# Patient Record
Sex: FEMALE | Race: WHITE | Employment: FULL TIME | URBAN - NONMETROPOLITAN AREA
[De-identification: names, ages, dates, MRNs, and addresses within clinical notes are randomized per-mention and may not be internally consistent; named-entity substitution may affect disease eponyms.]

---

## 2017-04-28 ENCOUNTER — TELEPHONE (OUTPATIENT)
Dept: FAMILY MEDICINE CLINIC | Age: 52
End: 2017-04-28

## 2017-05-05 DIAGNOSIS — F41.1 GAD (GENERALIZED ANXIETY DISORDER): ICD-10-CM

## 2017-05-05 RX ORDER — CLONAZEPAM 0.5 MG/1
0.5 TABLET ORAL 2 TIMES DAILY PRN
Qty: 60 TABLET | Refills: 3 | Status: SHIPPED | OUTPATIENT
Start: 2017-05-05 | End: 2017-07-05

## 2017-06-22 ENCOUNTER — OFFICE VISIT (OUTPATIENT)
Dept: FAMILY MEDICINE CLINIC | Age: 52
End: 2017-06-22

## 2017-06-22 VITALS
DIASTOLIC BLOOD PRESSURE: 80 MMHG | TEMPERATURE: 98.9 F | BODY MASS INDEX: 33.68 KG/M2 | WEIGHT: 183 LBS | HEART RATE: 80 BPM | RESPIRATION RATE: 12 BRPM | HEIGHT: 62 IN | SYSTOLIC BLOOD PRESSURE: 132 MMHG

## 2017-06-22 DIAGNOSIS — F33.42 RECURRENT MAJOR DEPRESSIVE DISORDER, IN FULL REMISSION (HCC): ICD-10-CM

## 2017-06-22 DIAGNOSIS — N20.0 KIDNEY STONES: Primary | ICD-10-CM

## 2017-06-22 DIAGNOSIS — R19.4 BOWEL HABIT CHANGES: ICD-10-CM

## 2017-06-22 DIAGNOSIS — E55.9 VITAMIN D DEFICIENCY: ICD-10-CM

## 2017-06-22 DIAGNOSIS — S80.11XA TRAUMATIC HEMATOMA OF RIGHT LOWER LEG, INITIAL ENCOUNTER: ICD-10-CM

## 2017-06-22 DIAGNOSIS — F41.1 GAD (GENERALIZED ANXIETY DISORDER): ICD-10-CM

## 2017-06-22 DIAGNOSIS — N30.01 ACUTE CYSTITIS WITH HEMATURIA: ICD-10-CM

## 2017-06-22 LAB
BILIRUBIN, POC: NEGATIVE
BLOOD URINE, POC: ABNORMAL
CLARITY, POC: CLEAR
COLOR, POC: YELLOW
GLUCOSE URINE, POC: NEGATIVE
KETONES, POC: NEGATIVE
LEUKOCYTE EST, POC: ABNORMAL
NITRITE, POC: ABNORMAL
PH, POC: 5.5
PROTEIN, POC: NEGATIVE
SPECIFIC GRAVITY, POC: >=1.03
UROBILINOGEN, POC: ABNORMAL

## 2017-06-22 PROCEDURE — 99214 OFFICE O/P EST MOD 30 MIN: CPT | Performed by: NURSE PRACTITIONER

## 2017-06-22 PROCEDURE — 81003 URINALYSIS AUTO W/O SCOPE: CPT | Performed by: NURSE PRACTITIONER

## 2017-06-22 RX ORDER — BUSPIRONE HYDROCHLORIDE 10 MG/1
10 TABLET ORAL 3 TIMES DAILY
Qty: 90 TABLET | Refills: 5 | Status: SHIPPED | OUTPATIENT
Start: 2017-06-22 | End: 2017-12-29

## 2017-06-22 RX ORDER — NITROFURANTOIN 25; 75 MG/1; MG/1
100 CAPSULE ORAL 2 TIMES DAILY
Qty: 20 CAPSULE | Refills: 0 | Status: SHIPPED | OUTPATIENT
Start: 2017-06-22 | End: 2017-07-02

## 2017-06-24 LAB
ORGANISM: ABNORMAL
URINE CULTURE, ROUTINE: ABNORMAL

## 2017-06-26 ENCOUNTER — TELEPHONE (OUTPATIENT)
Dept: FAMILY MEDICINE CLINIC | Age: 52
End: 2017-06-26

## 2017-06-29 ENCOUNTER — TELEPHONE (OUTPATIENT)
Dept: FAMILY MEDICINE CLINIC | Age: 52
End: 2017-06-29

## 2017-07-05 ENCOUNTER — OFFICE VISIT (OUTPATIENT)
Dept: UROLOGY | Age: 52
End: 2017-07-05

## 2017-07-05 VITALS
BODY MASS INDEX: 33.13 KG/M2 | SYSTOLIC BLOOD PRESSURE: 118 MMHG | DIASTOLIC BLOOD PRESSURE: 82 MMHG | WEIGHT: 180 LBS | HEIGHT: 62 IN

## 2017-07-05 DIAGNOSIS — N39.0 URINARY TRACT INFECTION, SITE UNSPECIFIED: Primary | ICD-10-CM

## 2017-07-05 LAB
BILIRUBIN URINE: NEGATIVE
BLOOD URINE, POC: NORMAL
CHARACTER, URINE: CLEAR
COLOR, URINE: YELLOW
GLUCOSE URINE: NEGATIVE MG/DL
KETONES, URINE: NEGATIVE
LEUKOCYTE CLUMPS, URINE: NORMAL
NITRITE, URINE: POSITIVE
PH, URINE: 6
PROTEIN, URINE: NEGATIVE MG/DL
SPECIFIC GRAVITY, URINE: 1.01 (ref 1–1.03)
UROBILINOGEN, URINE: 0.2 EU/DL

## 2017-07-05 PROCEDURE — 99213 OFFICE O/P EST LOW 20 MIN: CPT | Performed by: NURSE PRACTITIONER

## 2017-07-05 PROCEDURE — 81003 URINALYSIS AUTO W/O SCOPE: CPT | Performed by: NURSE PRACTITIONER

## 2017-07-05 RX ORDER — DOXYCYCLINE HYCLATE 100 MG/1
100 CAPSULE ORAL 2 TIMES DAILY
Qty: 20 CAPSULE | Refills: 0 | Status: SHIPPED | OUTPATIENT
Start: 2017-07-05 | End: 2017-07-15

## 2017-07-05 ASSESSMENT — ENCOUNTER SYMPTOMS
NAUSEA: 0
VOMITING: 0
ABDOMINAL PAIN: 0

## 2017-07-07 ENCOUNTER — TELEPHONE (OUTPATIENT)
Dept: UROLOGY | Age: 52
End: 2017-07-07

## 2017-07-07 LAB
ORGANISM: ABNORMAL
URINE CULTURE, ROUTINE: ABNORMAL

## 2017-07-18 ENCOUNTER — HOSPITAL ENCOUNTER (OUTPATIENT)
Dept: CT IMAGING | Age: 52
Discharge: HOME | End: 2017-07-18
Payer: COMMERCIAL

## 2017-07-18 DIAGNOSIS — N20.0 CALCULUS OF KIDNEY: ICD-10-CM

## 2017-07-18 DIAGNOSIS — N39.0 URINARY TRACT INFECTION, SITE UNSPECIFIED: ICD-10-CM

## 2017-07-18 PROCEDURE — 74176 CT ABD & PELVIS W/O CONTRAST: CPT

## 2017-07-21 ENCOUNTER — TELEPHONE (OUTPATIENT)
Dept: UROLOGY | Age: 52
End: 2017-07-21

## 2017-07-24 ENCOUNTER — TELEPHONE (OUTPATIENT)
Dept: UROLOGY | Age: 52
End: 2017-07-24

## 2017-08-03 ENCOUNTER — HOSPITAL ENCOUNTER (OUTPATIENT)
Dept: GENERAL RADIOLOGY | Age: 52
Discharge: HOME | End: 2017-08-03
Payer: COMMERCIAL

## 2017-08-03 ENCOUNTER — TELEPHONE (OUTPATIENT)
Dept: UROLOGY | Age: 52
End: 2017-08-03

## 2017-08-03 ENCOUNTER — HOSPITAL ENCOUNTER (OUTPATIENT)
Age: 52
Discharge: HOME | End: 2017-08-03
Payer: COMMERCIAL

## 2017-08-03 DIAGNOSIS — N20.0 KIDNEY STONES: ICD-10-CM

## 2017-08-03 DIAGNOSIS — Z01.818 PRE-OP TESTING: ICD-10-CM

## 2017-08-03 DIAGNOSIS — N20.0 KIDNEY STONES: Primary | ICD-10-CM

## 2017-08-03 LAB
AMORPHOUS: ABNORMAL
ANION GAP SERPL CALCULATED.3IONS-SCNC: 17 MEQ/L (ref 8–16)
BACTERIA: ABNORMAL /HPF
BASOPHILS # BLD: 0.7 %
BASOPHILS ABSOLUTE: 0.1 THOU/MM3 (ref 0–0.1)
BILIRUBIN URINE: NEGATIVE
BLOOD, URINE: NEGATIVE
BUN BLDV-MCNC: 15 MG/DL (ref 7–22)
CALCIUM SERPL-MCNC: 9.7 MG/DL (ref 8.5–10.5)
CASTS 2: ABNORMAL /LPF
CASTS UA: ABNORMAL /LPF
CHARACTER, URINE: ABNORMAL
CHLORIDE BLD-SCNC: 106 MEQ/L (ref 98–111)
CO2: 27 MEQ/L (ref 23–33)
COLOR: YELLOW
CREAT SERPL-MCNC: 0.9 MG/DL (ref 0.4–1.2)
CRYSTALS, UA: ABNORMAL
EKG ATRIAL RATE: 60 BPM
EKG P AXIS: 42 DEGREES
EKG P-R INTERVAL: 170 MS
EKG Q-T INTERVAL: 418 MS
EKG QRS DURATION: 86 MS
EKG QTC CALCULATION (BAZETT): 418 MS
EKG R AXIS: 17 DEGREES
EKG T AXIS: 17 DEGREES
EKG VENTRICULAR RATE: 60 BPM
EOSINOPHIL # BLD: 3 %
EOSINOPHILS ABSOLUTE: 0.2 THOU/MM3 (ref 0–0.4)
EPITHELIAL CELLS, UA: ABNORMAL /HPF
GFR SERPL CREATININE-BSD FRML MDRD: 65 ML/MIN/1.73M2
GLUCOSE BLD-MCNC: 105 MG/DL (ref 70–108)
GLUCOSE URINE: NEGATIVE MG/DL
HCT VFR BLD CALC: 43.5 % (ref 37–47)
HEMOGLOBIN: 15.5 GM/DL (ref 12–16)
KETONES, URINE: NEGATIVE
LEUKOCYTE ESTERASE, URINE: ABNORMAL
LYMPHOCYTES # BLD: 30.1 %
LYMPHOCYTES ABSOLUTE: 2.2 THOU/MM3 (ref 1–4.8)
MCH RBC QN AUTO: 32.8 PG (ref 27–31)
MCHC RBC AUTO-ENTMCNC: 35.5 GM/DL (ref 33–37)
MCV RBC AUTO: 92.4 FL (ref 81–99)
MISCELLANEOUS 2: ABNORMAL
MONOCYTES # BLD: 7 %
MONOCYTES ABSOLUTE: 0.5 THOU/MM3 (ref 0.4–1.3)
NITRITE, URINE: NEGATIVE
NUCLEATED RED BLOOD CELLS: 0 /100 WBC
PDW BLD-RTO: 12.3 % (ref 11.5–14.5)
PH UA: 6.5
PLATELET # BLD: 216 THOU/MM3 (ref 130–400)
PMV BLD AUTO: 9.5 MCM (ref 7.4–10.4)
POTASSIUM SERPL-SCNC: 4 MEQ/L (ref 3.5–5.2)
PROTEIN UA: ABNORMAL
RBC # BLD: 4.71 MILL/MM3 (ref 4.2–5.4)
RBC # BLD: NORMAL 10*6/UL
RBC URINE: ABNORMAL /HPF
RENAL EPITHELIAL, UA: ABNORMAL
SEG NEUTROPHILS: 59.2 %
SEGMENTED NEUTROPHILS ABSOLUTE COUNT: 4.3 THOU/MM3 (ref 1.8–7.7)
SODIUM BLD-SCNC: 150 MEQ/L (ref 135–145)
SPECIFIC GRAVITY, URINE: 1.02 (ref 1–1.03)
UROBILINOGEN, URINE: 0.2 EU/DL
WBC # BLD: 7.3 THOU/MM3 (ref 4.8–10.8)
WBC UA: ABNORMAL /HPF
YEAST: ABNORMAL

## 2017-08-03 PROCEDURE — 93005 ELECTROCARDIOGRAM TRACING: CPT

## 2017-08-03 PROCEDURE — 81001 URINALYSIS AUTO W/SCOPE: CPT

## 2017-08-03 PROCEDURE — 85025 COMPLETE CBC W/AUTO DIFF WBC: CPT

## 2017-08-03 PROCEDURE — 71020 XR CHEST STANDARD TWO VW: CPT

## 2017-08-03 PROCEDURE — 36415 COLL VENOUS BLD VENIPUNCTURE: CPT

## 2017-08-03 PROCEDURE — 80048 BASIC METABOLIC PNL TOTAL CA: CPT

## 2017-08-03 PROCEDURE — 87086 URINE CULTURE/COLONY COUNT: CPT

## 2017-08-04 ENCOUNTER — HOSPITAL ENCOUNTER (OUTPATIENT)
Age: 52
Setting detail: OUTPATIENT SURGERY
Discharge: HOME | End: 2017-08-04
Attending: UROLOGY | Admitting: UROLOGY
Payer: COMMERCIAL

## 2017-08-04 ENCOUNTER — ANESTHESIA EVENT (OUTPATIENT)
Dept: OPERATING ROOM | Age: 52
End: 2017-08-04
Payer: COMMERCIAL

## 2017-08-04 ENCOUNTER — ANESTHESIA (OUTPATIENT)
Dept: OPERATING ROOM | Age: 52
End: 2017-08-04
Payer: COMMERCIAL

## 2017-08-04 VITALS
TEMPERATURE: 97.4 F | HEIGHT: 63 IN | DIASTOLIC BLOOD PRESSURE: 77 MMHG | RESPIRATION RATE: 14 BRPM | SYSTOLIC BLOOD PRESSURE: 147 MMHG | HEART RATE: 79 BPM | WEIGHT: 173.06 LBS | BODY MASS INDEX: 30.66 KG/M2 | OXYGEN SATURATION: 97 %

## 2017-08-04 VITALS
TEMPERATURE: 96.8 F | OXYGEN SATURATION: 99 % | SYSTOLIC BLOOD PRESSURE: 106 MMHG | DIASTOLIC BLOOD PRESSURE: 57 MMHG | RESPIRATION RATE: 13 BRPM

## 2017-08-04 LAB
ORGANISM: ABNORMAL
URINE CULTURE REFLEX: ABNORMAL

## 2017-08-04 PROCEDURE — 6360000002 HC RX W HCPCS: Performed by: UROLOGY

## 2017-08-04 PROCEDURE — 52332 CYSTOSCOPY AND TREATMENT: CPT | Performed by: UROLOGY

## 2017-08-04 PROCEDURE — 2580000003 HC RX 258: Performed by: UROLOGY

## 2017-08-04 PROCEDURE — 7100000001 HC PACU RECOVERY - ADDTL 15 MIN: Performed by: UROLOGY

## 2017-08-04 PROCEDURE — 3600000013 HC SURGERY LEVEL 3 ADDTL 15MIN: Performed by: UROLOGY

## 2017-08-04 PROCEDURE — 7100000010 HC PHASE II RECOVERY - FIRST 15 MIN: Performed by: UROLOGY

## 2017-08-04 PROCEDURE — 3600000003 HC SURGERY LEVEL 3 BASE: Performed by: UROLOGY

## 2017-08-04 PROCEDURE — 7100000011 HC PHASE II RECOVERY - ADDTL 15 MIN: Performed by: UROLOGY

## 2017-08-04 PROCEDURE — 6360000002 HC RX W HCPCS: Performed by: NURSE ANESTHETIST, CERTIFIED REGISTERED

## 2017-08-04 PROCEDURE — 7100000000 HC PACU RECOVERY - FIRST 15 MIN: Performed by: UROLOGY

## 2017-08-04 PROCEDURE — 3700000001 HC ADD 15 MINUTES (ANESTHESIA): Performed by: UROLOGY

## 2017-08-04 PROCEDURE — 3700000000 HC ANESTHESIA ATTENDED CARE: Performed by: UROLOGY

## 2017-08-04 PROCEDURE — 6360000004 HC RX CONTRAST MEDICATION: Performed by: UROLOGY

## 2017-08-04 PROCEDURE — 52344 CYSTO/URETERO STRICTURE TX: CPT | Performed by: UROLOGY

## 2017-08-04 PROCEDURE — C2617 STENT, NON-COR, TEM W/O DEL: HCPCS | Performed by: UROLOGY

## 2017-08-04 PROCEDURE — 2500000003 HC RX 250 WO HCPCS: Performed by: NURSE ANESTHETIST, CERTIFIED REGISTERED

## 2017-08-04 PROCEDURE — C1726 CATH, BAL DIL, NON-VASCULAR: HCPCS | Performed by: UROLOGY

## 2017-08-04 PROCEDURE — C1769 GUIDE WIRE: HCPCS | Performed by: UROLOGY

## 2017-08-04 DEVICE — STENT URET 7FR L22-30CM PERCFLX HYDR+ SFT PGTL TAPR TIP: Type: IMPLANTABLE DEVICE | Site: URETER | Status: FUNCTIONAL

## 2017-08-04 RX ORDER — MEPERIDINE HYDROCHLORIDE 25 MG/ML
12.5 INJECTION INTRAMUSCULAR; INTRAVENOUS; SUBCUTANEOUS EVERY 5 MIN PRN
Status: DISCONTINUED | OUTPATIENT
Start: 2017-08-04 | End: 2017-08-04 | Stop reason: HOSPADM

## 2017-08-04 RX ORDER — SODIUM CHLORIDE 0.9 % (FLUSH) 0.9 %
10 SYRINGE (ML) INJECTION PRN
Status: DISCONTINUED | OUTPATIENT
Start: 2017-08-04 | End: 2017-08-04 | Stop reason: HOSPADM

## 2017-08-04 RX ORDER — SULFAMETHOXAZOLE AND TRIMETHOPRIM 800; 160 MG/1; MG/1
1 TABLET ORAL EVERY 12 HOURS
Qty: 10 TABLET | Refills: 0 | Status: SHIPPED | OUTPATIENT
Start: 2017-08-04 | End: 2017-08-09

## 2017-08-04 RX ORDER — HYDROCODONE BITARTRATE AND ACETAMINOPHEN 5; 325 MG/1; MG/1
2 TABLET ORAL EVERY 4 HOURS PRN
Status: DISCONTINUED | OUTPATIENT
Start: 2017-08-04 | End: 2017-08-04 | Stop reason: HOSPADM

## 2017-08-04 RX ORDER — DOCUSATE SODIUM 100 MG/1
100 CAPSULE, LIQUID FILLED ORAL DAILY PRN
Qty: 30 CAPSULE | Refills: 1 | Status: SHIPPED | OUTPATIENT
Start: 2017-08-04 | End: 2019-10-17 | Stop reason: SDUPTHER

## 2017-08-04 RX ORDER — FENTANYL CITRATE 50 UG/ML
50 INJECTION, SOLUTION INTRAMUSCULAR; INTRAVENOUS EVERY 5 MIN PRN
Status: DISCONTINUED | OUTPATIENT
Start: 2017-08-04 | End: 2017-08-04 | Stop reason: HOSPADM

## 2017-08-04 RX ORDER — PROMETHAZINE HYDROCHLORIDE 25 MG/ML
6.25 INJECTION, SOLUTION INTRAMUSCULAR; INTRAVENOUS
Status: DISCONTINUED | OUTPATIENT
Start: 2017-08-04 | End: 2017-08-04 | Stop reason: HOSPADM

## 2017-08-04 RX ORDER — MIDAZOLAM HYDROCHLORIDE 1 MG/ML
INJECTION INTRAMUSCULAR; INTRAVENOUS PRN
Status: DISCONTINUED | OUTPATIENT
Start: 2017-08-04 | End: 2017-08-04 | Stop reason: SDUPTHER

## 2017-08-04 RX ORDER — HYDRALAZINE HYDROCHLORIDE 20 MG/ML
5 INJECTION INTRAMUSCULAR; INTRAVENOUS EVERY 10 MIN PRN
Status: DISCONTINUED | OUTPATIENT
Start: 2017-08-04 | End: 2017-08-04 | Stop reason: HOSPADM

## 2017-08-04 RX ORDER — FENTANYL CITRATE 50 UG/ML
25 INJECTION, SOLUTION INTRAMUSCULAR; INTRAVENOUS EVERY 5 MIN PRN
Status: DISCONTINUED | OUTPATIENT
Start: 2017-08-04 | End: 2017-08-04 | Stop reason: HOSPADM

## 2017-08-04 RX ORDER — SODIUM CHLORIDE 9 MG/ML
INJECTION, SOLUTION INTRAVENOUS CONTINUOUS
Status: DISCONTINUED | OUTPATIENT
Start: 2017-08-04 | End: 2017-08-04 | Stop reason: HOSPADM

## 2017-08-04 RX ORDER — HYDROCODONE BITARTRATE AND ACETAMINOPHEN 5; 325 MG/1; MG/1
1 TABLET ORAL EVERY 6 HOURS PRN
Qty: 10 TABLET | Refills: 0 | Status: SHIPPED | OUTPATIENT
Start: 2017-08-04 | End: 2017-08-07

## 2017-08-04 RX ORDER — SODIUM CHLORIDE 450 MG/100ML
INJECTION, SOLUTION INTRAVENOUS CONTINUOUS
Status: DISCONTINUED | OUTPATIENT
Start: 2017-08-04 | End: 2017-08-04 | Stop reason: HOSPADM

## 2017-08-04 RX ORDER — ONDANSETRON 2 MG/ML
4 INJECTION INTRAMUSCULAR; INTRAVENOUS
Status: DISCONTINUED | OUTPATIENT
Start: 2017-08-04 | End: 2017-08-04 | Stop reason: HOSPADM

## 2017-08-04 RX ORDER — ONDANSETRON 2 MG/ML
4 INJECTION INTRAMUSCULAR; INTRAVENOUS EVERY 6 HOURS PRN
Status: DISCONTINUED | OUTPATIENT
Start: 2017-08-04 | End: 2017-08-04 | Stop reason: HOSPADM

## 2017-08-04 RX ORDER — ONDANSETRON 2 MG/ML
INJECTION INTRAMUSCULAR; INTRAVENOUS PRN
Status: DISCONTINUED | OUTPATIENT
Start: 2017-08-04 | End: 2017-08-04 | Stop reason: SDUPTHER

## 2017-08-04 RX ORDER — LABETALOL HYDROCHLORIDE 5 MG/ML
5 INJECTION, SOLUTION INTRAVENOUS EVERY 10 MIN PRN
Status: DISCONTINUED | OUTPATIENT
Start: 2017-08-04 | End: 2017-08-04 | Stop reason: HOSPADM

## 2017-08-04 RX ORDER — FENTANYL CITRATE 50 UG/ML
INJECTION, SOLUTION INTRAMUSCULAR; INTRAVENOUS PRN
Status: DISCONTINUED | OUTPATIENT
Start: 2017-08-04 | End: 2017-08-04 | Stop reason: SDUPTHER

## 2017-08-04 RX ORDER — EPHEDRINE SULFATE 50 MG/ML
INJECTION INTRAVENOUS PRN
Status: DISCONTINUED | OUTPATIENT
Start: 2017-08-04 | End: 2017-08-04 | Stop reason: SDUPTHER

## 2017-08-04 RX ORDER — ATROPA BELLADONNA AND OPIUM 16.2; 3 MG/1; MG/1
30 SUPPOSITORY RECTAL EVERY 8 HOURS PRN
Status: DISCONTINUED | OUTPATIENT
Start: 2017-08-04 | End: 2017-08-04 | Stop reason: HOSPADM

## 2017-08-04 RX ORDER — SODIUM CHLORIDE 0.9 % (FLUSH) 0.9 %
10 SYRINGE (ML) INJECTION EVERY 12 HOURS SCHEDULED
Status: DISCONTINUED | OUTPATIENT
Start: 2017-08-04 | End: 2017-08-04 | Stop reason: HOSPADM

## 2017-08-04 RX ORDER — HYDROCODONE BITARTRATE AND ACETAMINOPHEN 5; 325 MG/1; MG/1
1 TABLET ORAL EVERY 4 HOURS PRN
Status: DISCONTINUED | OUTPATIENT
Start: 2017-08-04 | End: 2017-08-04 | Stop reason: HOSPADM

## 2017-08-04 RX ORDER — PROPOFOL 10 MG/ML
INJECTION, EMULSION INTRAVENOUS PRN
Status: DISCONTINUED | OUTPATIENT
Start: 2017-08-04 | End: 2017-08-04 | Stop reason: SDUPTHER

## 2017-08-04 RX ORDER — ACETAMINOPHEN 325 MG/1
650 TABLET ORAL EVERY 4 HOURS PRN
Status: DISCONTINUED | OUTPATIENT
Start: 2017-08-04 | End: 2017-08-04 | Stop reason: HOSPADM

## 2017-08-04 RX ADMIN — EPHEDRINE SULFATE 15 MG: 50 INJECTION INTRAVENOUS at 09:37

## 2017-08-04 RX ADMIN — MIDAZOLAM 2 MG: 1 INJECTION INTRAMUSCULAR; INTRAVENOUS at 09:21

## 2017-08-04 RX ADMIN — EPHEDRINE SULFATE 15 MG: 50 INJECTION INTRAVENOUS at 09:33

## 2017-08-04 RX ADMIN — PROPOFOL 180 MG: 10 INJECTION, EMULSION INTRAVENOUS at 09:25

## 2017-08-04 RX ADMIN — LIDOCAINE HYDROCHLORIDE 100 MG: 20 INJECTION, SOLUTION INTRAVENOUS at 09:25

## 2017-08-04 RX ADMIN — FENTANYL CITRATE 100 MCG: 50 INJECTION INTRAMUSCULAR; INTRAVENOUS at 09:23

## 2017-08-04 RX ADMIN — SODIUM CHLORIDE: 9 INJECTION, SOLUTION INTRAVENOUS at 08:31

## 2017-08-04 RX ADMIN — ONDANSETRON 4 MG: 2 INJECTION INTRAMUSCULAR; INTRAVENOUS at 09:47

## 2017-08-04 RX ADMIN — CEFTRIAXONE 1 G: 1 INJECTION, POWDER, FOR SOLUTION INTRAMUSCULAR; INTRAVENOUS at 09:30

## 2017-08-04 ASSESSMENT — PULMONARY FUNCTION TESTS
PIF_VALUE: 1
PIF_VALUE: 26
PIF_VALUE: 12
PIF_VALUE: 12
PIF_VALUE: 11
PIF_VALUE: 2
PIF_VALUE: 12
PIF_VALUE: 22
PIF_VALUE: 2
PIF_VALUE: 1
PIF_VALUE: 10
PIF_VALUE: 2
PIF_VALUE: 12
PIF_VALUE: 16
PIF_VALUE: 2
PIF_VALUE: 10
PIF_VALUE: 2
PIF_VALUE: 12
PIF_VALUE: 11
PIF_VALUE: 24
PIF_VALUE: 2
PIF_VALUE: 11
PIF_VALUE: 12
PIF_VALUE: 12
PIF_VALUE: 1
PIF_VALUE: 12
PIF_VALUE: 11
PIF_VALUE: 11
PIF_VALUE: 12
PIF_VALUE: 1

## 2017-08-04 ASSESSMENT — PAIN SCALES - GENERAL
PAINLEVEL_OUTOF10: 0
PAINLEVEL_OUTOF10: 6
PAINLEVEL_OUTOF10: 0
PAINLEVEL_OUTOF10: 0

## 2017-08-04 ASSESSMENT — PAIN - FUNCTIONAL ASSESSMENT: PAIN_FUNCTIONAL_ASSESSMENT: 0-10

## 2017-08-04 ASSESSMENT — PAIN DESCRIPTION - DESCRIPTORS: DESCRIPTORS: ACHING

## 2017-08-09 DIAGNOSIS — F41.1 GAD (GENERALIZED ANXIETY DISORDER): ICD-10-CM

## 2017-08-09 DIAGNOSIS — F33.1 MODERATE EPISODE OF RECURRENT MAJOR DEPRESSIVE DISORDER (HCC): ICD-10-CM

## 2017-08-09 RX ORDER — VENLAFAXINE HYDROCHLORIDE 75 MG/1
CAPSULE, EXTENDED RELEASE ORAL
Qty: 90 CAPSULE | Refills: 3 | Status: SHIPPED | OUTPATIENT
Start: 2017-08-09 | End: 2018-10-02 | Stop reason: SDUPTHER

## 2017-08-09 RX ORDER — VENLAFAXINE HYDROCHLORIDE 150 MG/1
CAPSULE, EXTENDED RELEASE ORAL
Qty: 90 CAPSULE | Refills: 3 | Status: SHIPPED | OUTPATIENT
Start: 2017-08-09 | End: 2018-10-02 | Stop reason: SDUPTHER

## 2017-08-09 RX ORDER — VENLAFAXINE HYDROCHLORIDE 150 MG/1
CAPSULE, EXTENDED RELEASE ORAL
Qty: 90 CAPSULE | Refills: 3 | OUTPATIENT
Start: 2017-08-09

## 2017-08-09 RX ORDER — VENLAFAXINE HYDROCHLORIDE 75 MG/1
CAPSULE, EXTENDED RELEASE ORAL
Qty: 90 CAPSULE | Refills: 3 | OUTPATIENT
Start: 2017-08-09

## 2017-08-14 ENCOUNTER — PROCEDURE VISIT (OUTPATIENT)
Dept: UROLOGY | Age: 52
End: 2017-08-14
Payer: COMMERCIAL

## 2017-08-14 VITALS
WEIGHT: 170 LBS | HEIGHT: 63 IN | SYSTOLIC BLOOD PRESSURE: 138 MMHG | BODY MASS INDEX: 30.12 KG/M2 | DIASTOLIC BLOOD PRESSURE: 92 MMHG

## 2017-08-14 DIAGNOSIS — N13.5 URETERAL STRICTURE: ICD-10-CM

## 2017-08-14 DIAGNOSIS — N20.0 KIDNEY STONES: Primary | ICD-10-CM

## 2017-08-14 PROCEDURE — 52310 CYSTOSCOPY AND TREATMENT: CPT | Performed by: UROLOGY

## 2017-08-14 PROCEDURE — 99999 PR OFFICE/OUTPT VISIT,PROCEDURE ONLY: CPT | Performed by: UROLOGY

## 2017-11-02 ENCOUNTER — HOSPITAL ENCOUNTER (EMERGENCY)
Age: 52
Discharge: HOME | End: 2017-11-02
Payer: COMMERCIAL

## 2017-11-02 VITALS
SYSTOLIC BLOOD PRESSURE: 139 MMHG | WEIGHT: 177.6 LBS | HEIGHT: 64 IN | RESPIRATION RATE: 16 BRPM | TEMPERATURE: 99.1 F | OXYGEN SATURATION: 97 % | DIASTOLIC BLOOD PRESSURE: 80 MMHG | BODY MASS INDEX: 30.32 KG/M2 | HEART RATE: 66 BPM

## 2017-11-02 DIAGNOSIS — R52 BODY ACHES: ICD-10-CM

## 2017-11-02 DIAGNOSIS — J20.9 ACUTE BRONCHITIS, UNSPECIFIED ORGANISM: Primary | ICD-10-CM

## 2017-11-02 LAB
FLU A ANTIGEN: NEGATIVE
INFLUENZA B AG, EIA: NEGATIVE

## 2017-11-02 PROCEDURE — 99213 OFFICE O/P EST LOW 20 MIN: CPT | Performed by: NURSE PRACTITIONER

## 2017-11-02 PROCEDURE — 99214 OFFICE O/P EST MOD 30 MIN: CPT

## 2017-11-02 PROCEDURE — 87804 INFLUENZA ASSAY W/OPTIC: CPT

## 2017-11-02 RX ORDER — PREDNISONE 20 MG/1
20 TABLET ORAL 2 TIMES DAILY
Qty: 10 TABLET | Refills: 0 | Status: SHIPPED | OUTPATIENT
Start: 2017-11-02 | End: 2017-11-07

## 2017-11-02 RX ORDER — DEXTROMETHORPHAN HYDROBROMIDE AND PROMETHAZINE HYDROCHLORIDE 15; 6.25 MG/5ML; MG/5ML
5 SYRUP ORAL 4 TIMES DAILY PRN
Qty: 120 ML | Refills: 0 | Status: SHIPPED | OUTPATIENT
Start: 2017-11-02 | End: 2017-11-09

## 2017-11-02 RX ORDER — DOXYCYCLINE HYCLATE 100 MG
100 TABLET ORAL 2 TIMES DAILY
Qty: 20 TABLET | Refills: 0 | Status: SHIPPED | OUTPATIENT
Start: 2017-11-02 | End: 2017-11-12

## 2017-11-02 ASSESSMENT — ENCOUNTER SYMPTOMS
VOMITING: 0
NAUSEA: 0
ABDOMINAL PAIN: 0
RHINORRHEA: 0
COUGH: 1
CHEST TIGHTNESS: 0
DIARRHEA: 0
SORE THROAT: 0
SHORTNESS OF BREATH: 0
SINUS CONGESTION: 0

## 2017-11-02 ASSESSMENT — PAIN DESCRIPTION - PAIN TYPE: TYPE: ACUTE PAIN

## 2017-11-02 ASSESSMENT — PAIN SCALES - GENERAL: PAINLEVEL_OUTOF10: 9

## 2017-11-02 ASSESSMENT — PAIN DESCRIPTION - LOCATION
LOCATION_2: GENERALIZED
LOCATION: THROAT

## 2017-11-02 ASSESSMENT — PAIN DESCRIPTION - DESCRIPTORS: DESCRIPTORS_2: ACHING

## 2017-11-02 ASSESSMENT — PAIN DESCRIPTION - INTENSITY: RATING_2: 10

## 2017-11-02 NOTE — ED TRIAGE NOTES
Patient ambulated to room with complaint of cough and general body ache that started Sunday night. Patient states she has missed work all week and has been taking over the counter cough medication and symptoms remain.

## 2017-11-02 NOTE — LETTER
[address]  [facility]  [facility] [id number]  Phone: [phone]               November 2, 2017    Patient: Myla Chery   YOB: 1965   Date of Visit: 11/2/2017       To Whom It May Concern:    Cristobal Darby was seen and treated in our emergency department on 11/2/2017. She may return to work on 11/4/2017. [facility] has been sick over the past several days and then came here for treatment today.       Sincerely,       Marquis Dorie NP         Signature:_Electronically signed by Marquis Dorie NP on 11/2/2017 at 12:08 PM  _________________________________

## 2017-11-02 NOTE — ED PROVIDER NOTES
[id number]  Urgent Care Encounter       CHIEF COMPLAINT       Chief Complaint   Patient presents with    Cough    Generalized Body Aches       Nurses Notes reviewed and I agree except as noted in the HPI. HISTORY OF PRESENT ILLNESS   Anthony Gerber is a 45 y.o. female who presents To the urgent care center complaining of generalized body aches not a moist nonproductive cough present. 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 The patient states that she has not worked all week and has felt very fatigue and had a congested sounding cough and body aches. Patient denied any nausea or vomiting. The history is provided by the patient. No  was used. Cough   Cough characteristics:  Non-productive  Severity:  Mild  Onset quality:  Gradual  Duration:  5 days  Progression:  Worsening  Chronicity:  New  Smoker: no    Context: not sick contacts    Relieved by:  Nothing  Ineffective treatments:  Cough suppressants and decongestant (robitussin and dayQuil)  Associated symptoms: chills and myalgias    Associated symptoms: no ear fullness, no ear pain, no fever, no rhinorrhea, no shortness of breath, no sinus congestion and no sore throat    Myalgias:     Location:  Generalized    Quality:  Aching    Severity:  Moderate    Onset quality:  Sudden    Duration:  5 days    Timing:  Constant    Progression:  Unchanged  Generalized Body Aches   Associated symptoms: cough, fatigue and myalgias    Associated symptoms: no abdominal pain, no diarrhea, no ear pain, no fever, no nausea, no rhinorrhea, no shortness of breath, no sore throat and no vomiting        REVIEW OF SYSTEMS     Review of Systems   Constitutional: Positive for activity change, chills and fatigue. Negative for fever. HENT: Negative for ear pain, rhinorrhea and sore throat. Respiratory: Positive for cough.  Negative for chest tightness and shortness of breath. Gastrointestinal: Negative for abdominal pain, diarrhea, nausea and vomiting. Musculoskeletal: Positive for myalgias. PAST MEDICAL HISTORY         Diagnosis Date    Depression     KATI (generalized anxiety disorder)     GERD (gastroesophageal reflux disease)     Hypertension     Kidney stone        SURGICAL HISTORY     Patient  has a past surgical history that includes Cholecystectomy; Hysterectomy; Kidney stone surgery; Colonoscopy; Kidney stone surgery (5/13/15); knee surgery (Right, 2008); and cysto/uretero/pyeloscopy, calculus tx (N/A, 8/4/2017). CURRENT MEDICATIONS       Discharge Medication List as of 11/2/2017 12:08 PM      CONTINUE these medications which have NOT CHANGED    Details   !! venlafaxine (EFFEXOR XR) 150 MG extended release capsule TAKE ONE CAPSULE BY MOUTH ONCE DAILY, Disp-90 capsule, R-3Normal      !! venlafaxine (EFFEXOR XR) 75 MG extended release capsule TAKE ONE CAPSULE BY MOUTH ONCE DAILY, Disp-90 capsule, R-3Normal      Lactobacillus (PROBIOTIC ACIDOPHILUS PO) Take 1 tablet by mouth dailyHistorical Med      vitamin D (CHOLECALCIFEROL) 1000 UNITS TABS tablet Take 1 tablet by mouth daily, Disp-30 tablet, R-11      Multiple Vitamins-Minerals (MULTIVITAMIN PO) Take by mouth      docusate sodium (COLACE) 100 MG capsule Take 1 capsule by mouth daily as needed for Constipation, Disp-30 capsule, R-1Normal      busPIRone (BUSPAR) 10 MG tablet Take 1 tablet by mouth 3 times daily, Disp-90 tablet, R-5Normal      loperamide (IMODIUM) 2 MG capsule Take 1 capsule by mouth daily as needed for Diarrhea, Disp-90 capsule, R-3       !! - Potential duplicate medications found. Please discuss with provider. ALLERGIES     Patient is is allergic to cipro iv [ciprofloxacin]. Patients   There is no immunization history on file for this patient.     FAMILY HISTORY     Patient's family history includes Cancer in her mother; Diabetes in her mother; High Blood Pressure in her mother; Kidney Disease in her mother; Mental Illness in her father. SOCIAL HISTORY     Patient  reports that she quit smoking about 24 years ago. She has never used smokeless tobacco. She reports that she drinks alcohol. She reports that she does not use drugs. PHYSICAL EXAM     ED TRIAGE VITALS  BP: 139/80, Temp: 99.1 °F (37.3 °C), Pulse: 66, Resp: 16, SpO2: 97 %,Estimated body mass index is 30.48 kg/m² as calculated from the following:    Height as of this encounter: 5' 4\" (1.626 m). Weight as of this encounter: 177 lb 9.6 oz (80.6 kg). ,No LMP recorded. Patient has had a hysterectomy. Physical Exam   Constitutional: She is oriented to person, place, and time. Vital signs are normal. She appears well-developed and well-nourished. She is cooperative. Non-toxic appearance. She does not have a sickly appearance. She does not appear ill. No distress. HENT:   Head: Normocephalic. Right Ear: Hearing, tympanic membrane, external ear and ear canal normal. No drainage, swelling or tenderness. No mastoid tenderness. Tympanic membrane is not erythematous. Left Ear: Hearing, tympanic membrane, external ear and ear canal normal. No drainage, swelling or tenderness. No mastoid tenderness. Tympanic membrane is not erythematous. Nose: Nose normal. No mucosal edema or rhinorrhea. Right sinus exhibits no maxillary sinus tenderness and no frontal sinus tenderness. Left sinus exhibits no maxillary sinus tenderness and no frontal sinus tenderness. Mouth/Throat: Uvula is midline, oropharynx is clear and moist and mucous membranes are normal. No oropharyngeal exudate, posterior oropharyngeal edema, posterior oropharyngeal erythema or tonsillar abscesses. Neck: Normal range of motion and full passive range of motion without pain. Neck supple. No spinous process tenderness and no muscular tenderness present. Cardiovascular: Normal rate, regular rhythm and normal heart sounds.     Pulmonary/Chest: Effort

## 2017-12-29 ENCOUNTER — HOSPITAL ENCOUNTER (EMERGENCY)
Age: 52
Discharge: HOME | End: 2017-12-29
Payer: COMMERCIAL

## 2017-12-29 VITALS
SYSTOLIC BLOOD PRESSURE: 163 MMHG | RESPIRATION RATE: 18 BRPM | BODY MASS INDEX: 33.49 KG/M2 | WEIGHT: 189 LBS | OXYGEN SATURATION: 97 % | DIASTOLIC BLOOD PRESSURE: 78 MMHG | HEIGHT: 63 IN | TEMPERATURE: 99.3 F | HEART RATE: 74 BPM

## 2017-12-29 DIAGNOSIS — Z87.19 HISTORY OF IBS: ICD-10-CM

## 2017-12-29 DIAGNOSIS — R19.7 DIARRHEA, UNSPECIFIED TYPE: Primary | ICD-10-CM

## 2017-12-29 PROCEDURE — 99213 OFFICE O/P EST LOW 20 MIN: CPT | Performed by: NURSE PRACTITIONER

## 2017-12-29 PROCEDURE — 99213 OFFICE O/P EST LOW 20 MIN: CPT

## 2017-12-29 ASSESSMENT — ENCOUNTER SYMPTOMS
VOMITING: 0
RECENT COUGH: 0
ABDOMINAL DISTENTION: 0
BACK PAIN: 0
NAUSEA: 0
DIARRHEA: 1
ABDOMINAL PAIN: 0

## 2017-12-29 NOTE — ED TRIAGE NOTES
Patient to urgent care with diarrhea x 1 week. Patient states she has had 3 episodes today of loose stool. Hx of IBS per patient.

## 2017-12-29 NOTE — ED PROVIDER NOTES
[facility]  Urgent [facility] Encounter       CHIEF COMPLAINT       Chief Complaint   Patient presents with    Diarrhea     x1 week. x3 today       Nurses Notes reviewed and I agree except as noted in the HPI. HISTORY OF PRESENT ILLNESS   Mickie Saint is a 45 y.o. female who presents To the urgent care center complaining of diarrhea ×1 week. Patient states she has had 3 diarrhea stools today. The history is provided by the patient. No  was used. Diarrhea   Quality:  Watery  Severity:  Mild  Number of episodes:  3 times this AM 9- 10 total stools over the past 24 hours. Duration:  1 week  Timing:  Intermittent  Progression:  Unchanged  Relieved by:  Nothing  Worsened by:  Nothing  Ineffective treatments:  Anti-motility medications  Associated symptoms: myalgias    Associated symptoms: no abdominal pain, no arthralgias, no chills, no recent cough, no diaphoresis, no fever, no headaches, no URI and no vomiting    Myalgias:     Location:  Generalized    Quality:  Aching    Severity:  Mild    Onset quality:  Sudden    Duration:  1 week    Timing:  Intermittent    Progression:  Waxing and waning  Risk factors: no recent antibiotic use and no sick contacts        REVIEW OF SYSTEMS     Review of Systems   Constitutional: Negative for appetite change, chills, diaphoresis, fatigue and fever. Gastrointestinal: Positive for diarrhea. Negative for abdominal distention, abdominal pain, nausea and vomiting. Genitourinary: Negative. Musculoskeletal: Positive for myalgias. Negative for arthralgias and back pain. Neurological: Negative for headaches. PAST MEDICAL HISTORY         Diagnosis Date    Depression     KATI (generalized anxiety disorder)     GERD (gastroesophageal reflux disease)     Hypertension     Kidney stone        SURGICAL HISTORY     Patient  has a past surgical history that includes Cholecystectomy; Hysterectomy; Kidney stone surgery;  Colonoscopy; TABS TABLET    Take 1 tablet by mouth daily       Current Discharge Medication List          Chris Powell NP    (Please note that portions of this note were completed with a voice recognition program.  Efforts were made to edit the dictations but occasionally words are mis-transcribed.)            Chris Powell NP  12/29/17 3100

## 2018-02-22 ENCOUNTER — HOSPITAL ENCOUNTER (EMERGENCY)
Age: 53
Discharge: HOME | End: 2018-02-22
Payer: COMMERCIAL

## 2018-02-22 VITALS
OXYGEN SATURATION: 98 % | BODY MASS INDEX: 33.19 KG/M2 | HEART RATE: 66 BPM | HEIGHT: 64 IN | RESPIRATION RATE: 16 BRPM | WEIGHT: 194.4 LBS | DIASTOLIC BLOOD PRESSURE: 88 MMHG | SYSTOLIC BLOOD PRESSURE: 162 MMHG | TEMPERATURE: 98.2 F

## 2018-02-22 DIAGNOSIS — J20.9 ACUTE BRONCHITIS, UNSPECIFIED ORGANISM: Primary | ICD-10-CM

## 2018-02-22 DIAGNOSIS — M94.0 ACUTE COSTOCHONDRITIS: ICD-10-CM

## 2018-02-22 PROCEDURE — 99213 OFFICE O/P EST LOW 20 MIN: CPT

## 2018-02-22 PROCEDURE — 99214 OFFICE O/P EST MOD 30 MIN: CPT | Performed by: NURSE PRACTITIONER

## 2018-02-22 RX ORDER — AZITHROMYCIN 250 MG/1
TABLET, FILM COATED ORAL
Qty: 6 TABLET | Refills: 0 | Status: SHIPPED | OUTPATIENT
Start: 2018-02-22 | End: 2018-02-22 | Stop reason: ALTCHOICE

## 2018-02-22 RX ORDER — BENZONATATE 200 MG/1
200 CAPSULE ORAL 3 TIMES DAILY PRN
Qty: 21 CAPSULE | Refills: 0 | Status: SHIPPED | OUTPATIENT
Start: 2018-02-22 | End: 2018-03-01

## 2018-02-22 RX ORDER — DOXYCYCLINE HYCLATE 100 MG/1
100 CAPSULE ORAL 2 TIMES DAILY
Qty: 14 CAPSULE | Refills: 0 | Status: SHIPPED | OUTPATIENT
Start: 2018-02-22 | End: 2018-03-01

## 2018-02-22 RX ORDER — FLUTICASONE PROPIONATE 50 MCG
2 SPRAY, SUSPENSION (ML) NASAL DAILY
Qty: 1 BOTTLE | Refills: 0 | Status: SHIPPED | OUTPATIENT
Start: 2018-02-22 | End: 2018-10-02 | Stop reason: ALTCHOICE

## 2018-02-22 RX ORDER — PREDNISONE 20 MG/1
20 TABLET ORAL 2 TIMES DAILY
Qty: 10 TABLET | Refills: 0 | Status: SHIPPED | OUTPATIENT
Start: 2018-02-22 | End: 2018-02-27

## 2018-02-22 ASSESSMENT — PAIN DESCRIPTION - DESCRIPTORS: DESCRIPTORS: ACHING

## 2018-02-22 ASSESSMENT — PAIN SCALES - GENERAL: PAINLEVEL_OUTOF10: 7

## 2018-02-22 ASSESSMENT — PAIN DESCRIPTION - ORIENTATION: ORIENTATION: MID

## 2018-02-22 ASSESSMENT — PAIN DESCRIPTION - LOCATION: LOCATION: CHEST

## 2018-02-22 NOTE — LETTER
[address]  [facility]  [facility] [address]  Phone: [phone]             February 22, 2018    Patient: Steven Mclaughlin   YOB: 1965   Date of Visit: 2/22/2018       To Whom It May Concern:    Андрей Rasmussen was seen and treated in our emergency department on 2/22/2018. She may return to work on 02/24/2018.       Sincerely,             Signature:__________________________________

## 2018-02-26 ASSESSMENT — ENCOUNTER SYMPTOMS
TROUBLE SWALLOWING: 0
WHEEZING: 0
CHEST TIGHTNESS: 1
SINUS PRESSURE: 0
SHORTNESS OF BREATH: 0
COUGH: 1
VOICE CHANGE: 0
STRIDOR: 0
SORE THROAT: 0
RHINORRHEA: 0

## 2018-02-26 NOTE — ED PROVIDER NOTES
medications which have NOT CHANGED    Details   hyoscyamine (LEVSIN/SL) 125 MCG sublingual tablet Place 1 tablet under the tongue every 4 hours as needed for Cramping or Diarrhea, Disp-30 tablet, R-0Normal      !! venlafaxine (EFFEXOR XR) 150 MG extended release capsule TAKE ONE CAPSULE BY MOUTH ONCE DAILY, Disp-90 capsule, R-3Normal      !! venlafaxine (EFFEXOR XR) 75 MG extended release capsule TAKE ONE CAPSULE BY MOUTH ONCE DAILY, Disp-90 capsule, R-3Normal      docusate sodium (COLACE) 100 MG capsule Take 1 capsule by mouth daily as needed for Constipation, Disp-30 capsule, R-1Normal      Multiple Vitamins-Minerals (MULTIVITAMIN PO) Take by mouth       !! - Potential duplicate medications found. Please discuss with provider. ALLERGIES     Patient is is allergic to cipro iv [ciprofloxacin]. FAMILY HISTORY     Patient's family history includes Cancer in her mother; Diabetes in her mother; High Blood Pressure in her mother; Kidney Disease in her mother; Mental Illness in her father. SOCIAL HISTORY     Patient  reports that she quit smoking about 24 years ago. She has never used smokeless tobacco. She reports that she drinks alcohol. She reports that she does not use drugs. PHYSICAL EXAM     ED TRIAGE VITALS  BP: (!) 162/88, Temp: 98.2 °F (36.8 °C), Pulse: 66, Resp: 16, SpO2: 98 %  Physical Exam   Constitutional: She is oriented to person, place, and time. She appears well-developed and well-nourished. Non-toxic appearance. She does not have a sickly appearance. She does not appear ill. No distress. HENT:   Head: Normocephalic and atraumatic. Right Ear: Hearing, tympanic membrane, external ear and ear canal normal.   Left Ear: Hearing, external ear and ear canal normal.   Nose: Nose normal.   Mouth/Throat: Uvula is midline and mucous membranes are normal. Posterior oropharyngeal erythema (mild) present. Neck: Normal range of motion. Neck supple.    Cardiovascular: Normal rate, regular rhythm, S1 normal, S2 normal and normal heart sounds. Exam reveals no gallop, no S3, no S4, no distant heart sounds and no friction rub. No murmur heard. Pulmonary/Chest: Effort normal and breath sounds normal. No accessory muscle usage or stridor. No tachypnea and no bradypnea. No respiratory distress. She has no decreased breath sounds. She has no wheezes. She has no rhonchi. She has no rales. She exhibits tenderness. She exhibits no mass, no bony tenderness, no crepitus, no edema, no deformity, no swelling and no retraction. Lymphadenopathy:     She has no cervical adenopathy. Neurological: She is alert and oriented to person, place, and time. Skin: Skin is warm, dry and intact. No rash noted. She is not diaphoretic. No cyanosis or erythema. No pallor. Nursing note and vitals reviewed. DIAGNOSTIC RESULTS   Labs:No results found for this visit on 02/22/18. IMAGING:    URGENT CARE COURSE:     Vitals:    02/22/18 1320   BP: (!) 162/88   Pulse: 66   Resp: 16   Temp: 98.2 °F (36.8 °C)   SpO2: 98%   Weight: 194 lb 6.4 oz (88.2 kg)   Height: 5' 4\" (1.626 m)       Medications - No data to display  PROCEDURES:  None  FINAL IMPRESSION      1. Acute bronchitis, unspecified organism    2. Acute costochondritis        DISPOSITION/PLAN   DISPOSITION Decision To Discharge 02/22/2018 01:48:20 PM  ADDITIONAL INSTRUCTIONS: Rest, no milk, plenty of clear liquids, tylenol/motrin for fever and pain.      98% pulse ox on room air  No acute distress  Afebrile, nontoxic in appearance  1 week onset symptoms, not improving  Doxycyline as prescribed  tx acute Bronchitis  Prednisone for acute costochondritis  Flonase for congestion  Tessalon perles for cough  Avoid OTC decongestants      PATIENT REFERRED TO:  Horacio Alexander, CNP  [address] [place] [address] [phone]    Schedule an appointment as soon as possible for a visit on 2/26/2018  If no improvement of symptoms    Patient instructed to follow up with PCP.  If symptoms worsen, become severe or new symptoms develop patient instructed to go to the emergency room immediately. DISCHARGE MEDICATIONS:  Discharge Medication List as of 2/22/2018  1:56 PM      START taking these medications    Details   benzonatate (TESSALON) 200 MG capsule Take 1 capsule by mouth 3 times daily as needed for Cough, Disp-21 capsule, R-0Normal      fluticasone (FLONASE) 50 MCG/ACT nasal spray 2 sprays by Nasal route daily Apply daily to each nare, Disp-1 Bottle, R-0Normal      predniSONE (DELTASONE) 20 MG tablet Take 1 tablet by mouth 2 times daily for 5 days, Disp-10 tablet, R-0Normal      doxycycline hyclate (VIBRAMYCIN) 100 MG capsule Take 1 capsule by mouth 2 times daily for 7 days, Disp-14 capsule, R-0Normal           Discharge Medication List as of 2/22/2018  1:56 PM          Patient given educational materials - see patient instructions. Discussed use, benefit, and side effects of prescribed medications. All patient questions answered. Pt voiced understanding. Reviewed health maintenance. Patient agreed with treatment plan. Follow up as directed.      Daja Riggs, [address]  02/26/18 8685

## 2018-02-27 ENCOUNTER — HOSPITAL ENCOUNTER (EMERGENCY)
Age: 53
Discharge: HOME | End: 2018-02-27
Payer: COMMERCIAL

## 2018-02-27 VITALS
BODY MASS INDEX: 32.79 KG/M2 | TEMPERATURE: 98.9 F | SYSTOLIC BLOOD PRESSURE: 144 MMHG | WEIGHT: 191 LBS | RESPIRATION RATE: 18 BRPM | HEART RATE: 77 BPM | OXYGEN SATURATION: 97 % | DIASTOLIC BLOOD PRESSURE: 93 MMHG

## 2018-02-27 DIAGNOSIS — J01.90 ACUTE RHINOSINUSITIS: ICD-10-CM

## 2018-02-27 DIAGNOSIS — J20.9 ACUTE BRONCHITIS, UNSPECIFIED ORGANISM: Primary | ICD-10-CM

## 2018-02-27 PROCEDURE — 99212 OFFICE O/P EST SF 10 MIN: CPT

## 2018-02-27 PROCEDURE — 99213 OFFICE O/P EST LOW 20 MIN: CPT | Performed by: NURSE PRACTITIONER

## 2018-02-27 RX ORDER — PROMETHAZINE HYDROCHLORIDE AND CODEINE PHOSPHATE 6.25; 1 MG/5ML; MG/5ML
5 SYRUP ORAL 4 TIMES DAILY PRN
Qty: 120 ML | Refills: 0 | Status: SHIPPED | OUTPATIENT
Start: 2018-02-27 | End: 2018-03-06

## 2018-02-27 ASSESSMENT — PAIN DESCRIPTION - FREQUENCY: FREQUENCY: INTERMITTENT

## 2018-02-27 ASSESSMENT — ACTIVITIES OF DAILY LIVING (ADL): EFFECT OF PAIN ON DAILY ACTIVITIES: WITH COUGH

## 2018-02-27 ASSESSMENT — PAIN DESCRIPTION - DESCRIPTORS: DESCRIPTORS: PRESSURE

## 2018-02-27 ASSESSMENT — PAIN DESCRIPTION - LOCATION: LOCATION: CHEST

## 2018-02-27 ASSESSMENT — PAIN SCALES - GENERAL: PAINLEVEL_OUTOF10: 8

## 2018-02-27 ASSESSMENT — PAIN DESCRIPTION - PAIN TYPE: TYPE: ACUTE PAIN

## 2018-02-27 NOTE — ED NOTES
Pt verbalized discharge instructions. Pt informed to go to ER if develop chest pain, shortness of breath or abdominal pain. Pt ambulatory out in stable condition. Assessment unchanged.        Castillo Alberto RN  02/27/18 3006

## 2018-02-27 NOTE — ED TRIAGE NOTES
Pt ambulatory into esuc with c/o sinus drainage with productive cough for the past week. Pt was treated here six days ago and is not getting any better. Pt states chest tight with a pain of 8 with coughing.

## 2018-02-27 NOTE — ED PROVIDER NOTES
[facility]  [facility] Encounter      CHIEF COMPLAINT       Chief Complaint   Patient presents with    Cough    Sinusitis       Nurses Notes reviewed and I agree except as noted in the HPI. HISTORY OF PRESENT ILLNESS   Elie Starkey is a 45 y.o. female who presents With complaints of persistent sinus pressure and congestion for the last 7-10 days. Patient states she was seen in this facility several days ago and symptoms have not resolved. She is requesting a work note for today and tomorrow. Patient states she has been taking the medications as prescribed when last seen at this facility. She is reporting continued fatigue but denies fever, chills, body aches. She describes the cough as an intermittent moist but nonproductive cough. Patient states the cough is much worse at night and is making it difficult to sleep. She has not had any wheezing, shortness breath, chest pain, chest tightness, or chest congestion. She denies ear pain and sore throat but does admit to sinus pressure and congestion as well as postnasal drainage. Patient denies nausea, vomiting, diarrhea, abdominal pain, headache, dizziness. REVIEW OF SYSTEMS     Review of Systems   Constitutional: Positive for fatigue. Negative for appetite change and chills. HENT: Positive for congestion, rhinorrhea, sinus pain and sinus pressure. Negative for ear pain, facial swelling, sore throat and trouble swallowing. Respiratory: Positive for cough. Negative for chest tightness, shortness of breath and wheezing. Cardiovascular: Negative for chest pain and palpitations. Gastrointestinal: Negative for abdominal pain, constipation, diarrhea, nausea and vomiting. Skin: Negative for rash. Neurological: Negative for dizziness, weakness, numbness and headaches.        PAST MEDICAL HISTORY         Diagnosis Date    Depression     KATI (generalized anxiety disorder)     GERD (gastroesophageal reflux disease)     Hypertension     Kidney stone        SURGICAL HISTORY     Patient  has a past surgical history that includes Cholecystectomy; Hysterectomy; Kidney stone surgery; Colonoscopy; Kidney stone surgery (5/13/15); knee surgery (Right, 2008); and cysto/uretero/pyeloscopy, calculus tx (N/A, 8/4/2017). CURRENT MEDICATIONS       Discharge Medication List as of 2/27/2018  3:08 PM      CONTINUE these medications which have NOT CHANGED    Details   benzonatate (TESSALON) 200 MG capsule Take 1 capsule by mouth 3 times daily as needed for Cough, Disp-21 capsule, R-0Normal      fluticasone (FLONASE) 50 MCG/ACT nasal spray 2 sprays by Nasal route daily Apply daily to each nare, Disp-1 Bottle, R-0Normal      predniSONE (DELTASONE) 20 MG tablet Take 1 tablet by mouth 2 times daily for 5 days, Disp-10 tablet, R-0Normal      doxycycline hyclate (VIBRAMYCIN) 100 MG capsule Take 1 capsule by mouth 2 times daily for 7 days, Disp-14 capsule, R-0Normal      hyoscyamine (LEVSIN/SL) 125 MCG sublingual tablet Place 1 tablet under the tongue every 4 hours as needed for Cramping or Diarrhea, Disp-30 tablet, R-0Normal      !! venlafaxine (EFFEXOR XR) 150 MG extended release capsule TAKE ONE CAPSULE BY MOUTH ONCE DAILY, Disp-90 capsule, R-3Normal      !! venlafaxine (EFFEXOR XR) 75 MG extended release capsule TAKE ONE CAPSULE BY MOUTH ONCE DAILY, Disp-90 capsule, R-3Normal      docusate sodium (COLACE) 100 MG capsule Take 1 capsule by mouth daily as needed for Constipation, Disp-30 capsule, R-1Normal      Multiple Vitamins-Minerals (MULTIVITAMIN PO) Take by mouth       !! - Potential duplicate medications found. Please discuss with provider. ALLERGIES     Patient is is allergic to cipro iv [ciprofloxacin]. FAMILY HISTORY     Patient's family history includes Cancer in her mother; Diabetes in her mother; High Blood Pressure in her mother; Kidney Disease in her mother; Mental Illness in her father.     SOCIAL HISTORY     Patient

## 2018-03-02 ASSESSMENT — ENCOUNTER SYMPTOMS
SINUS PAIN: 1
TROUBLE SWALLOWING: 0
SHORTNESS OF BREATH: 0
NAUSEA: 0
COUGH: 1
CHEST TIGHTNESS: 0
FACIAL SWELLING: 0
WHEEZING: 0
RHINORRHEA: 1
SINUS PRESSURE: 1
VOMITING: 0
CONSTIPATION: 0
DIARRHEA: 0
SORE THROAT: 0
ABDOMINAL PAIN: 0

## 2018-04-13 ENCOUNTER — APPOINTMENT (OUTPATIENT)
Dept: GENERAL RADIOLOGY | Age: 53
DRG: 315 | End: 2018-04-13
Payer: COMMERCIAL

## 2018-04-13 ENCOUNTER — HOSPITAL ENCOUNTER (INPATIENT)
Age: 53
LOS: 4 days | Discharge: HOME | DRG: 315 | End: 2018-04-17
Attending: EMERGENCY MEDICINE | Admitting: ORTHOPAEDIC SURGERY
Payer: COMMERCIAL

## 2018-04-13 DIAGNOSIS — S52.612A CLOSED DISPLACED FRACTURE OF STYLOID PROCESS OF LEFT ULNA, INITIAL ENCOUNTER: ICD-10-CM

## 2018-04-13 DIAGNOSIS — S82.141A CLOSED FRACTURE OF RIGHT TIBIAL PLATEAU, INITIAL ENCOUNTER: ICD-10-CM
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
## 2018-04-13 DIAGNOSIS — S52.532A CLOSED COLLES' FRACTURE OF LEFT RADIUS, INITIAL ENCOUNTER: Primary | ICD-10-CM

## 2018-04-13 PROBLEM — S52.509A FRACTURE OF DISTAL END OF RADIUS: Status: ACTIVE | Noted: 2018-04-13

## 2018-04-13 LAB
ALBUMIN SERPL-MCNC: 4.4 G/DL (ref 3.5–5.1)
ALP BLD-CCNC: 81 U/L (ref 38–126)
ALT SERPL-CCNC: 16 U/L (ref 11–66)
ANION GAP SERPL CALCULATED.3IONS-SCNC: 13 MEQ/L (ref 8–16)
AST SERPL-CCNC: 18 U/L (ref 5–40)
BASOPHILS # BLD: 0.7 %
BASOPHILS ABSOLUTE: 0.1 THOU/MM3 (ref 0–0.1)
BILIRUB SERPL-MCNC: 0.7 MG/DL (ref 0.3–1.2)
BILIRUBIN DIRECT: < 0.2 MG/DL (ref 0–0.3)
BUN BLDV-MCNC: 15 MG/DL (ref 7–22)
CALCIUM SERPL-MCNC: 9.4 MG/DL (ref 8.5–10.5)
CHLORIDE BLD-SCNC: 101 MEQ/L (ref 98–111)
CO2: 27 MEQ/L (ref 23–33)
CREAT SERPL-MCNC: 0.8 MG/DL (ref 0.4–1.2)
EKG ATRIAL RATE: 74 BPM
EKG P AXIS: 32 DEGREES
EKG P-R INTERVAL: 190 MS
EKG Q-T INTERVAL: 416 MS
EKG QRS DURATION: 86 MS
EKG QTC CALCULATION (BAZETT): 461 MS
EKG R AXIS: 14 DEGREES
EKG T AXIS: 42 DEGREES
EKG VENTRICULAR RATE: 74 BPM
EOSINOPHIL # BLD: 2.7 %
EOSINOPHILS ABSOLUTE: 0.3 THOU/MM3 (ref 0–0.4)
GFR SERPL CREATININE-BSD FRML MDRD: 75 ML/MIN/1.73M2
GLUCOSE BLD-MCNC: 101 MG/DL (ref 70–108)
HCT VFR BLD CALC: 41.4 % (ref 37–47)
HEMOGLOBIN: 14.1 GM/DL (ref 12–16)
INR BLD: 1.02 (ref 0.85–1.13)
LIPASE: 64.2 U/L (ref 5.6–51.3)
LYMPHOCYTES # BLD: 37.4 %
LYMPHOCYTES ABSOLUTE: 3.6 THOU/MM3 (ref 1–4.8)
MAGNESIUM: 2.1 MG/DL (ref 1.6–2.4)
MCH RBC QN AUTO: 31.3 PG (ref 27–31)
MCHC RBC AUTO-ENTMCNC: 34.2 GM/DL (ref 33–37)
MCV RBC AUTO: 91.5 FL (ref 81–99)
MONOCYTES # BLD: 6.6 %
MONOCYTES ABSOLUTE: 0.6 THOU/MM3 (ref 0.4–1.3)
NUCLEATED RED BLOOD CELLS: 0 /100 WBC
OSMOLALITY CALCULATION: 282.2 MOSMOL/KG (ref 275–300)
PDW BLD-RTO: 12.7 % (ref 11.5–14.5)
PLATELET # BLD: 230 THOU/MM3 (ref 130–400)
PMV BLD AUTO: 9.1 FL (ref 7.4–10.4)
POTASSIUM SERPL-SCNC: 3.2 MEQ/L (ref 3.5–5.2)
RBC # BLD: 4.52 MILL/MM3 (ref 4.2–5.4)
SEG NEUTROPHILS: 52.6 %
SEGMENTED NEUTROPHILS ABSOLUTE COUNT: 5.1 THOU/MM3 (ref 1.8–7.7)
SODIUM BLD-SCNC: 141 MEQ/L (ref 135–145)
TOTAL PROTEIN: 7.1 G/DL (ref 6.1–8)
TROPONIN T: < 0.01 NG/ML
WBC # BLD: 9.7 THOU/MM3 (ref 4.8–10.8)

## 2018-04-13 PROCEDURE — 96376 TX/PRO/DX INJ SAME DRUG ADON: CPT

## 2018-04-13 PROCEDURE — 85025 COMPLETE CBC W/AUTO DIFF WBC: CPT

## 2018-04-13 PROCEDURE — 71045 X-RAY EXAM CHEST 1 VIEW: CPT

## 2018-04-13 PROCEDURE — 83735 ASSAY OF MAGNESIUM: CPT

## 2018-04-13 PROCEDURE — 6370000000 HC RX 637 (ALT 250 FOR IP): Performed by: ORTHOPAEDIC SURGERY

## 2018-04-13 PROCEDURE — 6820000002 HC L2 INJURY CALL ACTIVATION

## 2018-04-13 PROCEDURE — 99285 EMERGENCY DEPT VISIT HI MDM: CPT

## 2018-04-13 PROCEDURE — 25605 CLTX DST RDL FX/EPHYS SEP W/: CPT

## 2018-04-13 PROCEDURE — 1200000000 HC SEMI PRIVATE

## 2018-04-13 PROCEDURE — 84484 ASSAY OF TROPONIN QUANT: CPT

## 2018-04-13 PROCEDURE — 99283 EMERGENCY DEPT VISIT LOW MDM: CPT | Performed by: SURGERY

## 2018-04-13 PROCEDURE — L1830 KO IMMOB CANVAS LONG PRE OTS: HCPCS

## 2018-04-13 PROCEDURE — 6360000002 HC RX W HCPCS

## 2018-04-13 PROCEDURE — 72170 X-RAY EXAM OF PELVIS: CPT

## 2018-04-13 PROCEDURE — 73100 X-RAY EXAM OF WRIST: CPT

## 2018-04-13 PROCEDURE — 85610 PROTHROMBIN TIME: CPT

## 2018-04-13 PROCEDURE — 6360000002 HC RX W HCPCS: Performed by: ORTHOPAEDIC SURGERY

## 2018-04-13 PROCEDURE — 2500000003 HC RX 250 WO HCPCS: Performed by: EMERGENCY MEDICINE

## 2018-04-13 PROCEDURE — 93005 ELECTROCARDIOGRAM TRACING: CPT | Performed by: ORTHOPAEDIC SURGERY

## 2018-04-13 PROCEDURE — 73564 X-RAY EXAM KNEE 4 OR MORE: CPT

## 2018-04-13 PROCEDURE — 73090 X-RAY EXAM OF FOREARM: CPT

## 2018-04-13 PROCEDURE — APPSS180 APP SPLIT SHARED TIME > 60 MINUTES: Performed by: NURSE PRACTITIONER

## 2018-04-13 PROCEDURE — 96374 THER/PROPH/DIAG INJ IV PUSH: CPT

## 2018-04-13 PROCEDURE — 80053 COMPREHEN METABOLIC PANEL: CPT

## 2018-04-13 PROCEDURE — 36415 COLL VENOUS BLD VENIPUNCTURE: CPT

## 2018-04-13 PROCEDURE — 6360000002 HC RX W HCPCS: Performed by: EMERGENCY MEDICINE

## 2018-04-13 PROCEDURE — 96375 TX/PRO/DX INJ NEW DRUG ADDON: CPT

## 2018-04-13 PROCEDURE — 82248 BILIRUBIN DIRECT: CPT

## 2018-04-13 PROCEDURE — 83690 ASSAY OF LIPASE: CPT

## 2018-04-13 PROCEDURE — 99152 MOD SED SAME PHYS/QHP 5/>YRS: CPT

## 2018-04-13 PROCEDURE — 0PSJXZZ REPOSITION LEFT RADIUS, EXTERNAL APPROACH: ICD-10-PCS | Performed by: EMERGENCY MEDICINE

## 2018-04-13 PROCEDURE — 73590 X-RAY EXAM OF LOWER LEG: CPT

## 2018-04-13 PROCEDURE — 2W3LXYZ IMMOBILIZATION OF RIGHT LOWER EXTREMITY USING OTHER DEVICE: ICD-10-PCS | Performed by: EMERGENCY MEDICINE

## 2018-04-13 RX ORDER — MORPHINE SULFATE 4 MG/ML
4 INJECTION, SOLUTION INTRAMUSCULAR; INTRAVENOUS ONCE
Status: COMPLETED | OUTPATIENT
Start: 2018-04-13 | End: 2018-04-13

## 2018-04-13 RX ORDER — ACETAMINOPHEN 325 MG/1
650 TABLET ORAL EVERY 4 HOURS PRN
Status: DISCONTINUED | OUTPATIENT
Start: 2018-04-13 | End: 2018-04-17 | Stop reason: HOSPADM

## 2018-04-13 RX ORDER — PROPOFOL 10 MG/ML
40 INJECTION, EMULSION INTRAVENOUS ONCE
Status: DISCONTINUED | OUTPATIENT
Start: 2018-04-13 | End: 2018-04-16

## 2018-04-13 RX ORDER — PROPOFOL 10 MG/ML
40 INJECTION, EMULSION INTRAVENOUS ONCE
Status: DISCONTINUED | OUTPATIENT
Start: 2018-04-13 | End: 2018-04-13 | Stop reason: SDUPTHER

## 2018-04-13 RX ORDER — KETAMINE HYDROCHLORIDE 50 MG/ML
1 INJECTION, SOLUTION, CONCENTRATE INTRAMUSCULAR; INTRAVENOUS ONCE
Status: COMPLETED | OUTPATIENT
Start: 2018-04-13 | End: 2018-04-13

## 2018-04-13 RX ORDER — HYDROCODONE BITARTRATE AND ACETAMINOPHEN 5; 325 MG/1; MG/1
1 TABLET ORAL EVERY 4 HOURS PRN
Status: DISCONTINUED | OUTPATIENT
Start: 2018-04-13 | End: 2018-04-14

## 2018-04-13 RX ORDER — PROPOFOL 10 MG/ML
INJECTION, EMULSION INTRAVENOUS
Status: DISPENSED
Start: 2018-04-13 | End: 2018-04-14

## 2018-04-13 RX ORDER — MORPHINE SULFATE 2 MG/ML
2 INJECTION, SOLUTION INTRAMUSCULAR; INTRAVENOUS
Status: DISPENSED | OUTPATIENT
Start: 2018-04-13 | End: 2018-04-17

## 2018-04-13 RX ORDER — ONDANSETRON 2 MG/ML
4 INJECTION INTRAMUSCULAR; INTRAVENOUS EVERY 6 HOURS PRN
Status: DISCONTINUED | OUTPATIENT
Start: 2018-04-13 | End: 2018-04-17 | Stop reason: HOSPADM

## 2018-04-13 RX ORDER — MORPHINE SULFATE 4 MG/ML
4 INJECTION, SOLUTION INTRAMUSCULAR; INTRAVENOUS
Status: DISPENSED | OUTPATIENT
Start: 2018-04-13 | End: 2018-04-17

## 2018-04-13 RX ORDER — MORPHINE SULFATE 4 MG/ML
INJECTION, SOLUTION INTRAMUSCULAR; INTRAVENOUS
Status: COMPLETED
Start: 2018-04-13 | End: 2018-04-13

## 2018-04-13 RX ORDER — HYDROCODONE BITARTRATE AND ACETAMINOPHEN 5; 325 MG/1; MG/1
2 TABLET ORAL EVERY 4 HOURS PRN
Status: DISCONTINUED | OUTPATIENT
Start: 2018-04-13 | End: 2018-04-14

## 2018-04-13 RX ORDER — SODIUM CHLORIDE 0.9 % (FLUSH) 0.9 %
10 SYRINGE (ML) INJECTION EVERY 12 HOURS SCHEDULED
Status: DISCONTINUED | OUTPATIENT
Start: 2018-04-13 | End: 2018-04-17 | Stop reason: HOSPADM

## 2018-04-13 RX ORDER — SODIUM CHLORIDE 0.9 % (FLUSH) 0.9 %
10 SYRINGE (ML) INJECTION PRN
Status: DISCONTINUED | OUTPATIENT
Start: 2018-04-13 | End: 2018-04-17 | Stop reason: HOSPADM

## 2018-04-13 RX ORDER — ONDANSETRON 4 MG/1
4 TABLET, ORALLY DISINTEGRATING ORAL ONCE
Status: COMPLETED | OUTPATIENT
Start: 2018-04-13 | End: 2018-04-13

## 2018-04-13 RX ADMIN — MORPHINE SULFATE 4 MG: 4 INJECTION, SOLUTION INTRAMUSCULAR; INTRAVENOUS at 17:34

## 2018-04-13 RX ADMIN — MORPHINE SULFATE 2 MG: 2 INJECTION, SOLUTION INTRAMUSCULAR; INTRAVENOUS at 22:04

## 2018-04-13 RX ADMIN — MORPHINE SULFATE 4 MG: 4 INJECTION, SOLUTION INTRAMUSCULAR; INTRAVENOUS at 18:44

## 2018-04-13 RX ADMIN — KETAMINE HYDROCHLORIDE 80 MG: 50 INJECTION, SOLUTION INTRAMUSCULAR; INTRAVENOUS at 19:30

## 2018-04-13 RX ADMIN — ONDANSETRON 4 MG: 4 TABLET, ORALLY DISINTEGRATING ORAL at 18:45

## 2018-04-13 RX ADMIN — HYDROCODONE BITARTRATE AND ACETAMINOPHEN 2 TABLET: 5; 325 TABLET ORAL at 23:18

## 2018-04-13 ASSESSMENT — PAIN DESCRIPTION - FREQUENCY: FREQUENCY: CONTINUOUS

## 2018-04-13 ASSESSMENT — ENCOUNTER SYMPTOMS
APNEA: 0
COLOR CHANGE: 0
SINUS PRESSURE: 0
VOICE CHANGE: 0
BLOOD IN STOOL: 0
BACK PAIN: 0
ABDOMINAL DISTENTION: 0
CHOKING: 0
CHEST TIGHTNESS: 0
EYE REDNESS: 0
EYE PAIN: 0
ABDOMINAL PAIN: 0
COUGH: 0
FACIAL SWELLING: 0
TROUBLE SWALLOWING: 0
EYE ITCHING: 0
EYE DISCHARGE: 0
WHEEZING: 0
PHOTOPHOBIA: 0
NAUSEA: 1
STRIDOR: 0
NAUSEA: 0
VOMITING: 0
SHORTNESS OF BREATH: 0
CONSTIPATION: 0
SORE THROAT: 0
DIARRHEA: 0
RHINORRHEA: 0

## 2018-04-13 ASSESSMENT — PAIN SCALES - GENERAL
PAINLEVEL_OUTOF10: 8
PAINLEVEL_OUTOF10: 10
PAINLEVEL_OUTOF10: 8
PAINLEVEL_OUTOF10: 10
PAINLEVEL_OUTOF10: 8
PAINLEVEL_OUTOF10: 8

## 2018-04-13 ASSESSMENT — PAIN DESCRIPTION - ORIENTATION: ORIENTATION: LEFT

## 2018-04-13 ASSESSMENT — PAIN DESCRIPTION - PAIN TYPE: TYPE: ACUTE PAIN

## 2018-04-13 ASSESSMENT — PAIN DESCRIPTION - LOCATION: LOCATION: ARM

## 2018-04-14 PROCEDURE — 1200000000 HC SEMI PRIVATE

## 2018-04-14 PROCEDURE — 93010 ELECTROCARDIOGRAM REPORT: CPT | Performed by: NUCLEAR MEDICINE

## 2018-04-14 PROCEDURE — 2580000003 HC RX 258: Performed by: ORTHOPAEDIC SURGERY

## 2018-04-14 PROCEDURE — 6370000000 HC RX 637 (ALT 250 FOR IP): Performed by: ORTHOPAEDIC SURGERY

## 2018-04-14 PROCEDURE — 6360000002 HC RX W HCPCS: Performed by: ORTHOPAEDIC SURGERY

## 2018-04-14 RX ORDER — OXYCODONE HYDROCHLORIDE AND ACETAMINOPHEN 5; 325 MG/1; MG/1
1 TABLET ORAL EVERY 4 HOURS PRN
Status: DISCONTINUED | OUTPATIENT
Start: 2018-04-14 | End: 2018-04-17 | Stop reason: HOSPADM

## 2018-04-14 RX ORDER — OXYCODONE HYDROCHLORIDE AND ACETAMINOPHEN 5; 325 MG/1; MG/1
2 TABLET ORAL EVERY 4 HOURS PRN
Status: DISCONTINUED | OUTPATIENT
Start: 2018-04-14 | End: 2018-04-17 | Stop reason: HOSPADM

## 2018-04-14 RX ADMIN — MORPHINE SULFATE 2 MG: 2 INJECTION, SOLUTION INTRAMUSCULAR; INTRAVENOUS at 01:22

## 2018-04-14 RX ADMIN — HYDROCODONE BITARTRATE AND ACETAMINOPHEN 2 TABLET: 5; 325 TABLET ORAL at 04:14

## 2018-04-14 RX ADMIN — MORPHINE SULFATE 2 MG: 2 INJECTION, SOLUTION INTRAMUSCULAR; INTRAVENOUS at 18:17

## 2018-04-14 RX ADMIN — MORPHINE SULFATE 4 MG: 4 INJECTION INTRAVENOUS at 21:59

## 2018-04-14 RX ADMIN — OXYCODONE HYDROCHLORIDE AND ACETAMINOPHEN 2 TABLET: 5; 325 TABLET ORAL at 20:47

## 2018-04-14 RX ADMIN — MORPHINE SULFATE 2 MG: 2 INJECTION, SOLUTION INTRAMUSCULAR; INTRAVENOUS at 12:10

## 2018-04-14 RX ADMIN — MORPHINE SULFATE 2 MG: 2 INJECTION, SOLUTION INTRAMUSCULAR; INTRAVENOUS at 07:42

## 2018-04-14 RX ADMIN — HYDROCODONE BITARTRATE AND ACETAMINOPHEN 2 TABLET: 5; 325 TABLET ORAL at 11:02

## 2018-04-14 RX ADMIN — OXYCODONE HYDROCHLORIDE AND ACETAMINOPHEN 2 TABLET: 5; 325 TABLET ORAL at 15:12

## 2018-04-14 RX ADMIN — Medication 10 ML: at 07:43

## 2018-04-14 ASSESSMENT — PAIN DESCRIPTION - PAIN TYPE
TYPE: ACUTE PAIN
TYPE_2: ACUTE PAIN

## 2018-04-14 ASSESSMENT — PAIN DESCRIPTION - LOCATION
LOCATION: ARM;LEG
LOCATION: ARM
LOCATION_2: LEG
LOCATION: ARM;LEG
LOCATION: ARM;LEG

## 2018-04-14 ASSESSMENT — PAIN DESCRIPTION - DESCRIPTORS
DESCRIPTORS_2: ACHING
DESCRIPTORS: ACHING;DISCOMFORT
DESCRIPTORS: ACHING
DESCRIPTORS: ACHING;DISCOMFORT
DESCRIPTORS: ACHING;DISCOMFORT

## 2018-04-14 ASSESSMENT — PAIN DESCRIPTION - ORIENTATION
ORIENTATION: LEFT;RIGHT
ORIENTATION_2: RIGHT
ORIENTATION: LEFT;RIGHT
ORIENTATION: LEFT;RIGHT
ORIENTATION: LEFT

## 2018-04-14 ASSESSMENT — PAIN SCALES - GENERAL
PAINLEVEL_OUTOF10: 8
PAINLEVEL_OUTOF10: 7
PAINLEVEL_OUTOF10: 7
PAINLEVEL_OUTOF10: 8
PAINLEVEL_OUTOF10: 7
PAINLEVEL_OUTOF10: 6
PAINLEVEL_OUTOF10: 8
PAINLEVEL_OUTOF10: 8
PAINLEVEL_OUTOF10: 7
PAINLEVEL_OUTOF10: 8

## 2018-04-14 ASSESSMENT — PAIN DESCRIPTION - FREQUENCY
FREQUENCY: INTERMITTENT

## 2018-04-14 ASSESSMENT — PAIN DESCRIPTION - ONSET: ONSET_2: ON-GOING

## 2018-04-14 ASSESSMENT — PAIN DESCRIPTION - INTENSITY: RATING_2: 7

## 2018-04-14 ASSESSMENT — PAIN DESCRIPTION - DURATION: DURATION_2: CONTINUOUS

## 2018-04-15 PROCEDURE — G8979 MOBILITY GOAL STATUS: HCPCS

## 2018-04-15 PROCEDURE — 97162 PT EVAL MOD COMPLEX 30 MIN: CPT

## 2018-04-15 PROCEDURE — 6360000002 HC RX W HCPCS: Performed by: ORTHOPAEDIC SURGERY

## 2018-04-15 PROCEDURE — 2580000003 HC RX 258: Performed by: ORTHOPAEDIC SURGERY

## 2018-04-15 PROCEDURE — 6370000000 HC RX 637 (ALT 250 FOR IP): Performed by: ORTHOPAEDIC SURGERY

## 2018-04-15 PROCEDURE — 97530 THERAPEUTIC ACTIVITIES: CPT

## 2018-04-15 PROCEDURE — 97535 SELF CARE MNGMENT TRAINING: CPT

## 2018-04-15 PROCEDURE — G8978 MOBILITY CURRENT STATUS: HCPCS

## 2018-04-15 PROCEDURE — 97165 OT EVAL LOW COMPLEX 30 MIN: CPT

## 2018-04-15 PROCEDURE — 1200000000 HC SEMI PRIVATE

## 2018-04-15 PROCEDURE — 97167 OT EVAL HIGH COMPLEX 60 MIN: CPT

## 2018-04-15 RX ADMIN — MORPHINE SULFATE 4 MG: 4 INJECTION INTRAVENOUS at 03:58

## 2018-04-15 RX ADMIN — OXYCODONE HYDROCHLORIDE AND ACETAMINOPHEN 2 TABLET: 5; 325 TABLET ORAL at 00:50

## 2018-04-15 RX ADMIN — MORPHINE SULFATE 4 MG: 4 INJECTION INTRAVENOUS at 01:46

## 2018-04-15 RX ADMIN — MORPHINE SULFATE 2 MG: 2 INJECTION, SOLUTION INTRAMUSCULAR; INTRAVENOUS at 14:49

## 2018-04-15 RX ADMIN — Medication 10 ML: at 08:23

## 2018-04-15 RX ADMIN — Medication 10 ML: at 17:10

## 2018-04-15 RX ADMIN — MORPHINE SULFATE 4 MG: 4 INJECTION INTRAVENOUS at 17:09

## 2018-04-15 RX ADMIN — Medication 10 ML: at 20:36

## 2018-04-15 RX ADMIN — OXYCODONE HYDROCHLORIDE AND ACETAMINOPHEN 2 TABLET: 5; 325 TABLET ORAL at 06:37

## 2018-04-15 RX ADMIN — MORPHINE SULFATE 2 MG: 2 INJECTION, SOLUTION INTRAMUSCULAR; INTRAVENOUS at 21:38

## 2018-04-15 RX ADMIN — OXYCODONE HYDROCHLORIDE AND ACETAMINOPHEN 2 TABLET: 5; 325 TABLET ORAL at 20:35

## 2018-04-15 RX ADMIN — OXYCODONE HYDROCHLORIDE AND ACETAMINOPHEN 2 TABLET: 5; 325 TABLET ORAL at 10:57

## 2018-04-15 RX ADMIN — OXYCODONE HYDROCHLORIDE AND ACETAMINOPHEN 2 TABLET: 5; 325 TABLET ORAL at 16:07

## 2018-04-15 RX ADMIN — MORPHINE SULFATE 2 MG: 2 INJECTION, SOLUTION INTRAMUSCULAR; INTRAVENOUS at 08:22

## 2018-04-15 RX ADMIN — Medication 10 ML: at 12:48

## 2018-04-15 RX ADMIN — ENOXAPARIN SODIUM 40 MG: 40 INJECTION SUBCUTANEOUS at 08:06

## 2018-04-15 ASSESSMENT — PAIN - FUNCTIONAL ASSESSMENT: PAIN_FUNCTIONAL_ASSESSMENT: 0-10

## 2018-04-15 ASSESSMENT — PAIN SCALES - GENERAL
PAINLEVEL_OUTOF10: 9
PAINLEVEL_OUTOF10: 8
PAINLEVEL_OUTOF10: 7
PAINLEVEL_OUTOF10: 9
PAINLEVEL_OUTOF10: 8
PAINLEVEL_OUTOF10: 8
PAINLEVEL_OUTOF10: 6
PAINLEVEL_OUTOF10: 8
PAINLEVEL_OUTOF10: 7
PAINLEVEL_OUTOF10: 8
PAINLEVEL_OUTOF10: 7
PAINLEVEL_OUTOF10: 8

## 2018-04-15 ASSESSMENT — PAIN DESCRIPTION - PAIN TYPE
TYPE: ACUTE PAIN
TYPE: ACUTE PAIN

## 2018-04-15 ASSESSMENT — PAIN DESCRIPTION - LOCATION
LOCATION: ARM;KNEE
LOCATION: ARM;KNEE

## 2018-04-15 ASSESSMENT — PAIN DESCRIPTION - DESCRIPTORS
DESCRIPTORS: ACHING
DESCRIPTORS: ACHING

## 2018-04-15 ASSESSMENT — PAIN DESCRIPTION - ORIENTATION: ORIENTATION: RIGHT;LEFT

## 2018-04-16 ENCOUNTER — APPOINTMENT (OUTPATIENT)
Dept: GENERAL RADIOLOGY | Age: 53
DRG: 315 | End: 2018-04-16
Payer: COMMERCIAL

## 2018-04-16 ENCOUNTER — ANESTHESIA (OUTPATIENT)
Dept: OPERATING ROOM | Age: 53
DRG: 315 | End: 2018-04-16
Payer: COMMERCIAL

## 2018-04-16 ENCOUNTER — ANESTHESIA EVENT (OUTPATIENT)
Dept: OPERATING ROOM | Age: 53
DRG: 315 | End: 2018-04-16
Payer: COMMERCIAL

## 2018-04-16 VITALS
OXYGEN SATURATION: 100 % | RESPIRATION RATE: 2 BRPM | DIASTOLIC BLOOD PRESSURE: 67 MMHG | SYSTOLIC BLOOD PRESSURE: 117 MMHG

## 2018-04-16 LAB — POTASSIUM SERPL-SCNC: 3.3 MEQ/L (ref 3.5–5.2)

## 2018-04-16 PROCEDURE — 7100000000 HC PACU RECOVERY - FIRST 15 MIN: Performed by: ORTHOPAEDIC SURGERY

## 2018-04-16 PROCEDURE — 6360000002 HC RX W HCPCS: Performed by: NURSE ANESTHETIST, CERTIFIED REGISTERED

## 2018-04-16 PROCEDURE — 84132 ASSAY OF SERUM POTASSIUM: CPT

## 2018-04-16 PROCEDURE — C1713 ANCHOR/SCREW BN/BN,TIS/BN: HCPCS | Performed by: ORTHOPAEDIC SURGERY

## 2018-04-16 PROCEDURE — 36415 COLL VENOUS BLD VENIPUNCTURE: CPT

## 2018-04-16 PROCEDURE — 3700000000 HC ANESTHESIA ATTENDED CARE: Performed by: ORTHOPAEDIC SURGERY

## 2018-04-16 PROCEDURE — 6360000002 HC RX W HCPCS: Performed by: NURSE PRACTITIONER

## 2018-04-16 PROCEDURE — 1200000000 HC SEMI PRIVATE

## 2018-04-16 PROCEDURE — 3700000001 HC ADD 15 MINUTES (ANESTHESIA): Performed by: ORTHOPAEDIC SURGERY

## 2018-04-16 PROCEDURE — 3209999900 FLUORO FOR SURGICAL PROCEDURES

## 2018-04-16 PROCEDURE — 2580000003 HC RX 258: Performed by: NURSE ANESTHETIST, CERTIFIED REGISTERED

## 2018-04-16 PROCEDURE — 6360000002 HC RX W HCPCS: Performed by: ORTHOPAEDIC SURGERY

## 2018-04-16 PROCEDURE — 97110 THERAPEUTIC EXERCISES: CPT

## 2018-04-16 PROCEDURE — 3600000014 HC SURGERY LEVEL 4 ADDTL 15MIN: Performed by: ORTHOPAEDIC SURGERY

## 2018-04-16 PROCEDURE — 73100 X-RAY EXAM OF WRIST: CPT

## 2018-04-16 PROCEDURE — 7100000001 HC PACU RECOVERY - ADDTL 15 MIN: Performed by: ORTHOPAEDIC SURGERY

## 2018-04-16 PROCEDURE — 6360000002 HC RX W HCPCS: Performed by: ANESTHESIOLOGY

## 2018-04-16 PROCEDURE — 0PSJ04Z REPOSITION LEFT RADIUS WITH INTERNAL FIXATION DEVICE, OPEN APPROACH: ICD-10-PCS | Performed by: ORTHOPAEDIC SURGERY

## 2018-04-16 PROCEDURE — 2580000003 HC RX 258: Performed by: ORTHOPAEDIC SURGERY

## 2018-04-16 PROCEDURE — 6370000000 HC RX 637 (ALT 250 FOR IP): Performed by: ORTHOPAEDIC SURGERY

## 2018-04-16 PROCEDURE — 3600000004 HC SURGERY LEVEL 4 BASE: Performed by: ORTHOPAEDIC SURGERY

## 2018-04-16 PROCEDURE — 2500000003 HC RX 250 WO HCPCS: Performed by: NURSE ANESTHETIST, CERTIFIED REGISTERED

## 2018-04-16 DEVICE — PERI-LOC VOLAR DISTAL RADIUS                                    LOCKING PLATE STANDARD HEAD 3 HOLE                                    SHAFT LEFT 62MM
Type: IMPLANTABLE DEVICE | Status: FUNCTIONAL
Brand: PERI-LOC

## 2018-04-16 DEVICE — SCREW NONLK 3.5X10MM: Type: IMPLANTABLE DEVICE | Status: FUNCTIONAL

## 2018-04-16 DEVICE — PERI-LOC 2.5MM T7 LOCKING SCREW                                    20MM SELF-TAPPING
Type: IMPLANTABLE DEVICE | Status: FUNCTIONAL
Brand: PERI-LOC

## 2018-04-16 DEVICE — PERI-LOC 3.5MM T20 CORTEX SCREW                                    12MM SELF-TAPPING
Type: IMPLANTABLE DEVICE | Status: FUNCTIONAL
Brand: PERI-LOC

## 2018-04-16 DEVICE — PERI-LOC 2.5MM T7 LOCKING SCREW                                    16MM SELF-TAPPING
Type: IMPLANTABLE DEVICE | Status: FUNCTIONAL
Brand: PERI-LOC

## 2018-04-16 RX ORDER — CEFAZOLIN SODIUM 1 G/50ML
1 INJECTION, SOLUTION INTRAVENOUS EVERY 8 HOURS
Status: COMPLETED | OUTPATIENT
Start: 2018-04-16 | End: 2018-04-17

## 2018-04-16 RX ORDER — OXYCODONE HYDROCHLORIDE AND ACETAMINOPHEN 5; 325 MG/1; MG/1
1-2 TABLET ORAL EVERY 4 HOURS PRN
Qty: 50 TABLET | Refills: 0 | Status: SHIPPED | OUTPATIENT
Start: 2018-04-16 | End: 2018-04-23

## 2018-04-16 RX ORDER — CYCLOBENZAPRINE HCL 10 MG
10 TABLET ORAL 3 TIMES DAILY PRN
Qty: 40 TABLET | Refills: 0 | Status: SHIPPED | OUTPATIENT
Start: 2018-04-16 | End: 2018-04-26

## 2018-04-16 RX ORDER — FENTANYL CITRATE 50 UG/ML
25 INJECTION, SOLUTION INTRAMUSCULAR; INTRAVENOUS EVERY 5 MIN PRN
Status: DISCONTINUED | OUTPATIENT
Start: 2018-04-16 | End: 2018-04-16 | Stop reason: HOSPADM

## 2018-04-16 RX ORDER — SODIUM CHLORIDE 9 MG/ML
INJECTION, SOLUTION INTRAVENOUS CONTINUOUS
Status: DISCONTINUED | OUTPATIENT
Start: 2018-04-16 | End: 2018-04-17 | Stop reason: HOSPADM

## 2018-04-16 RX ORDER — FENTANYL CITRATE 50 UG/ML
INJECTION, SOLUTION INTRAMUSCULAR; INTRAVENOUS PRN
Status: DISCONTINUED | OUTPATIENT
Start: 2018-04-16 | End: 2018-04-16 | Stop reason: SDUPTHER

## 2018-04-16 RX ORDER — CYCLOBENZAPRINE HCL 10 MG
10 TABLET ORAL 3 TIMES DAILY PRN
Status: DISCONTINUED | OUTPATIENT
Start: 2018-04-16 | End: 2018-04-17 | Stop reason: HOSPADM

## 2018-04-16 RX ORDER — PROPOFOL 10 MG/ML
INJECTION, EMULSION INTRAVENOUS PRN
Status: DISCONTINUED | OUTPATIENT
Start: 2018-04-16 | End: 2018-04-16 | Stop reason: SDUPTHER

## 2018-04-16 RX ORDER — LABETALOL HYDROCHLORIDE 5 MG/ML
5 INJECTION, SOLUTION INTRAVENOUS EVERY 10 MIN PRN
Status: DISCONTINUED | OUTPATIENT
Start: 2018-04-16 | End: 2018-04-16 | Stop reason: HOSPADM

## 2018-04-16 RX ORDER — MIDAZOLAM HYDROCHLORIDE 1 MG/ML
INJECTION INTRAMUSCULAR; INTRAVENOUS PRN
Status: DISCONTINUED | OUTPATIENT
Start: 2018-04-16 | End: 2018-04-16 | Stop reason: SDUPTHER

## 2018-04-16 RX ORDER — FENTANYL CITRATE 50 UG/ML
50 INJECTION, SOLUTION INTRAMUSCULAR; INTRAVENOUS EVERY 5 MIN PRN
Status: DISCONTINUED | OUTPATIENT
Start: 2018-04-16 | End: 2018-04-16 | Stop reason: HOSPADM

## 2018-04-16 RX ORDER — PROMETHAZINE HYDROCHLORIDE 25 MG/ML
6.25 INJECTION, SOLUTION INTRAMUSCULAR; INTRAVENOUS
Status: DISCONTINUED | OUTPATIENT
Start: 2018-04-16 | End: 2018-04-16 | Stop reason: HOSPADM

## 2018-04-16 RX ORDER — HYDRALAZINE HYDROCHLORIDE 20 MG/ML
5 INJECTION INTRAMUSCULAR; INTRAVENOUS EVERY 10 MIN PRN
Status: DISCONTINUED | OUTPATIENT
Start: 2018-04-16 | End: 2018-04-16 | Stop reason: HOSPADM

## 2018-04-16 RX ORDER — DEXAMETHASONE SODIUM PHOSPHATE 4 MG/ML
INJECTION, SOLUTION INTRA-ARTICULAR; INTRALESIONAL; INTRAMUSCULAR; INTRAVENOUS; SOFT TISSUE PRN
Status: DISCONTINUED | OUTPATIENT
Start: 2018-04-16 | End: 2018-04-16 | Stop reason: SDUPTHER

## 2018-04-16 RX ORDER — LIDOCAINE HYDROCHLORIDE 20 MG/ML
INJECTION, SOLUTION INFILTRATION; PERINEURAL PRN
Status: DISCONTINUED | OUTPATIENT
Start: 2018-04-16 | End: 2018-04-16 | Stop reason: SDUPTHER

## 2018-04-16 RX ORDER — SODIUM CHLORIDE 9 MG/ML
INJECTION, SOLUTION INTRAVENOUS CONTINUOUS PRN
Status: DISCONTINUED | OUTPATIENT
Start: 2018-04-16 | End: 2018-04-16 | Stop reason: SDUPTHER

## 2018-04-16 RX ORDER — MEPERIDINE HYDROCHLORIDE 25 MG/ML
12.5 INJECTION INTRAMUSCULAR; INTRAVENOUS; SUBCUTANEOUS EVERY 5 MIN PRN
Status: DISCONTINUED | OUTPATIENT
Start: 2018-04-16 | End: 2018-04-16 | Stop reason: HOSPADM

## 2018-04-16 RX ORDER — POTASSIUM CHLORIDE 20 MEQ/1
40 TABLET, EXTENDED RELEASE ORAL ONCE
Status: COMPLETED | OUTPATIENT
Start: 2018-04-16 | End: 2018-04-16

## 2018-04-16 RX ORDER — ONDANSETRON 2 MG/ML
4 INJECTION INTRAMUSCULAR; INTRAVENOUS
Status: DISCONTINUED | OUTPATIENT
Start: 2018-04-16 | End: 2018-04-16 | Stop reason: HOSPADM

## 2018-04-16 RX ORDER — DOCUSATE SODIUM 100 MG/1
100 CAPSULE, LIQUID FILLED ORAL DAILY PRN
Status: DISCONTINUED | OUTPATIENT
Start: 2018-04-16 | End: 2018-04-17 | Stop reason: HOSPADM

## 2018-04-16 RX ADMIN — FENTANYL CITRATE 50 MCG: 50 INJECTION, SOLUTION INTRAMUSCULAR; INTRAVENOUS at 16:55

## 2018-04-16 RX ADMIN — LIDOCAINE HYDROCHLORIDE 100 MG: 20 INJECTION, SOLUTION INFILTRATION; PERINEURAL at 15:37

## 2018-04-16 RX ADMIN — MORPHINE SULFATE 4 MG: 4 INJECTION INTRAVENOUS at 05:00

## 2018-04-16 RX ADMIN — DEXAMETHASONE SODIUM PHOSPHATE 4 MG: 4 INJECTION, SOLUTION INTRAMUSCULAR; INTRAVENOUS at 15:42

## 2018-04-16 RX ADMIN — SODIUM CHLORIDE: 9 INJECTION, SOLUTION INTRAVENOUS at 14:38

## 2018-04-16 RX ADMIN — POTASSIUM CHLORIDE 40 MEQ: 20 TABLET, EXTENDED RELEASE ORAL at 20:43

## 2018-04-16 RX ADMIN — FENTANYL CITRATE 50 MCG: 50 INJECTION INTRAMUSCULAR; INTRAVENOUS at 15:50

## 2018-04-16 RX ADMIN — FENTANYL CITRATE 50 MCG: 50 INJECTION INTRAMUSCULAR; INTRAVENOUS at 15:40

## 2018-04-16 RX ADMIN — FENTANYL CITRATE 50 MCG: 50 INJECTION INTRAMUSCULAR; INTRAVENOUS at 16:05

## 2018-04-16 RX ADMIN — CEFAZOLIN SODIUM 1 G: 1 INJECTION, SOLUTION INTRAVENOUS at 23:11

## 2018-04-16 RX ADMIN — SODIUM CHLORIDE: 9 INJECTION, SOLUTION INTRAVENOUS at 01:25

## 2018-04-16 RX ADMIN — SODIUM CHLORIDE: 9 INJECTION, SOLUTION INTRAVENOUS at 15:34

## 2018-04-16 RX ADMIN — MORPHINE SULFATE 4 MG: 4 INJECTION INTRAVENOUS at 10:15

## 2018-04-16 RX ADMIN — OXYCODONE HYDROCHLORIDE AND ACETAMINOPHEN 2 TABLET: 5; 325 TABLET ORAL at 23:21

## 2018-04-16 RX ADMIN — PROPOFOL 150 MG: 10 INJECTION, EMULSION INTRAVENOUS at 15:37

## 2018-04-16 RX ADMIN — OXYCODONE HYDROCHLORIDE AND ACETAMINOPHEN 2 TABLET: 5; 325 TABLET ORAL at 01:17

## 2018-04-16 RX ADMIN — OXYCODONE HYDROCHLORIDE AND ACETAMINOPHEN 2 TABLET: 5; 325 TABLET ORAL at 08:16

## 2018-04-16 RX ADMIN — SODIUM CHLORIDE: 9 INJECTION, SOLUTION INTRAVENOUS at 23:46

## 2018-04-16 RX ADMIN — CEFAZOLIN SODIUM 2 G: 2 SOLUTION INTRAVENOUS at 15:42

## 2018-04-16 RX ADMIN — FENTANYL CITRATE 50 MCG: 50 INJECTION, SOLUTION INTRAMUSCULAR; INTRAVENOUS at 17:00

## 2018-04-16 RX ADMIN — MORPHINE SULFATE 4 MG: 4 INJECTION INTRAVENOUS at 14:22

## 2018-04-16 RX ADMIN — Medication 10 ML: at 20:41

## 2018-04-16 RX ADMIN — MIDAZOLAM HYDROCHLORIDE 2 MG: 1 INJECTION, SOLUTION INTRAMUSCULAR; INTRAVENOUS at 15:34

## 2018-04-16 RX ADMIN — MORPHINE SULFATE 4 MG: 4 INJECTION INTRAVENOUS at 20:44

## 2018-04-16 ASSESSMENT — PULMONARY FUNCTION TESTS
PIF_VALUE: 19
PIF_VALUE: 18
PIF_VALUE: 0
PIF_VALUE: 17
PIF_VALUE: 11
PIF_VALUE: 11
PIF_VALUE: 3
PIF_VALUE: 19
PIF_VALUE: 11
PIF_VALUE: 12
PIF_VALUE: 11
PIF_VALUE: 3
PIF_VALUE: 13
PIF_VALUE: 19
PIF_VALUE: 22
PIF_VALUE: 11
PIF_VALUE: 17
PIF_VALUE: 18
PIF_VALUE: 16
PIF_VALUE: 7
PIF_VALUE: 11
PIF_VALUE: 11
PIF_VALUE: 18
PIF_VALUE: 1
PIF_VALUE: 11
PIF_VALUE: 1
PIF_VALUE: 11
PIF_VALUE: 0
PIF_VALUE: 14
PIF_VALUE: 30
PIF_VALUE: 11
PIF_VALUE: 20
PIF_VALUE: 11
PIF_VALUE: 7
PIF_VALUE: 11
PIF_VALUE: 3
PIF_VALUE: 11

## 2018-04-16 ASSESSMENT — PAIN SCALES - GENERAL
PAINLEVEL_OUTOF10: 8
PAINLEVEL_OUTOF10: 8
PAINLEVEL_OUTOF10: 7
PAINLEVEL_OUTOF10: 7
PAINLEVEL_OUTOF10: 8
PAINLEVEL_OUTOF10: 10
PAINLEVEL_OUTOF10: 6
PAINLEVEL_OUTOF10: 8
PAINLEVEL_OUTOF10: 9
PAINLEVEL_OUTOF10: 10

## 2018-04-16 ASSESSMENT — PAIN DESCRIPTION - LOCATION
LOCATION: WRIST;LEG
LOCATION: WRIST
LOCATION: WRIST
LOCATION_2: KNEE
LOCATION: WRIST
LOCATION_2: KNEE

## 2018-04-16 ASSESSMENT — PAIN DESCRIPTION - ONSET: ONSET: PROGRESSIVE

## 2018-04-16 ASSESSMENT — PAIN DESCRIPTION - DESCRIPTORS: DESCRIPTORS: ACHING

## 2018-04-16 ASSESSMENT — PAIN DESCRIPTION - INTENSITY
RATING_2: 8
RATING_2: 5

## 2018-04-16 ASSESSMENT — PAIN DESCRIPTION - PAIN TYPE
TYPE_2: ACUTE PAIN
TYPE: ACUTE PAIN
TYPE_2: ACUTE PAIN
TYPE: ACUTE PAIN
TYPE: ACUTE PAIN;SURGICAL PAIN
TYPE: SURGICAL PAIN

## 2018-04-16 ASSESSMENT — PAIN DESCRIPTION - ORIENTATION
ORIENTATION: LEFT
ORIENTATION_2: RIGHT
ORIENTATION_2: RIGHT
ORIENTATION: LEFT
ORIENTATION: LEFT;RIGHT
ORIENTATION: LEFT

## 2018-04-16 ASSESSMENT — PAIN DESCRIPTION - FREQUENCY: FREQUENCY: CONTINUOUS

## 2018-04-17 ENCOUNTER — TELEPHONE (OUTPATIENT)
Dept: FAMILY MEDICINE CLINIC | Age: 53
End: 2018-04-17

## 2018-04-17 VITALS
TEMPERATURE: 98.5 F | HEART RATE: 64 BPM | HEIGHT: 63 IN | DIASTOLIC BLOOD PRESSURE: 74 MMHG | BODY MASS INDEX: 31.89 KG/M2 | WEIGHT: 180 LBS | OXYGEN SATURATION: 95 % | SYSTOLIC BLOOD PRESSURE: 133 MMHG | RESPIRATION RATE: 16 BRPM

## 2018-04-17 LAB — POTASSIUM SERPL-SCNC: 4 MEQ/L (ref 3.5–5.2)

## 2018-04-17 PROCEDURE — 97530 THERAPEUTIC ACTIVITIES: CPT

## 2018-04-17 PROCEDURE — 97116 GAIT TRAINING THERAPY: CPT

## 2018-04-17 PROCEDURE — 6360000002 HC RX W HCPCS: Performed by: NURSE PRACTITIONER

## 2018-04-17 PROCEDURE — 6360000002 HC RX W HCPCS: Performed by: ORTHOPAEDIC SURGERY

## 2018-04-17 PROCEDURE — 6370000000 HC RX 637 (ALT 250 FOR IP): Performed by: ORTHOPAEDIC SURGERY

## 2018-04-17 PROCEDURE — 97535 SELF CARE MNGMENT TRAINING: CPT

## 2018-04-17 PROCEDURE — 84132 ASSAY OF SERUM POTASSIUM: CPT

## 2018-04-17 PROCEDURE — 36415 COLL VENOUS BLD VENIPUNCTURE: CPT

## 2018-04-17 RX ADMIN — OXYCODONE HYDROCHLORIDE AND ACETAMINOPHEN 2 TABLET: 5; 325 TABLET ORAL at 09:45

## 2018-04-17 RX ADMIN — MORPHINE SULFATE 2 MG: 2 INJECTION, SOLUTION INTRAMUSCULAR; INTRAVENOUS at 01:46

## 2018-04-17 RX ADMIN — OXYCODONE HYDROCHLORIDE AND ACETAMINOPHEN 2 TABLET: 5; 325 TABLET ORAL at 03:32

## 2018-04-17 RX ADMIN — ENOXAPARIN SODIUM 40 MG: 40 INJECTION SUBCUTANEOUS at 09:45

## 2018-04-17 RX ADMIN — OXYCODONE HYDROCHLORIDE AND ACETAMINOPHEN 2 TABLET: 5; 325 TABLET ORAL at 13:51

## 2018-04-17 RX ADMIN — CEFAZOLIN SODIUM 1 G: 1 INJECTION, SOLUTION INTRAVENOUS at 06:31

## 2018-04-17 ASSESSMENT — PAIN SCALES - GENERAL
PAINLEVEL_OUTOF10: 7
PAINLEVEL_OUTOF10: 8
PAINLEVEL_OUTOF10: 7
PAINLEVEL_OUTOF10: 9
PAINLEVEL_OUTOF10: 7
PAINLEVEL_OUTOF10: 9
PAINLEVEL_OUTOF10: 9

## 2018-04-17 ASSESSMENT — PAIN DESCRIPTION - ORIENTATION
ORIENTATION: LEFT
ORIENTATION_2: RIGHT

## 2018-04-17 ASSESSMENT — PAIN DESCRIPTION - INTENSITY: RATING_2: 9

## 2018-04-17 ASSESSMENT — PAIN DESCRIPTION - DESCRIPTORS: DESCRIPTORS: ACHING

## 2018-04-17 ASSESSMENT — PAIN DESCRIPTION - LOCATION
LOCATION: WRIST
LOCATION_2: KNEE
LOCATION: WRIST
LOCATION: WRIST

## 2018-04-17 ASSESSMENT — PAIN DESCRIPTION - FREQUENCY: FREQUENCY: CONTINUOUS

## 2018-04-17 ASSESSMENT — PAIN DESCRIPTION - PAIN TYPE
TYPE: SURGICAL PAIN
TYPE_2: ACUTE PAIN

## 2018-04-17 ASSESSMENT — PAIN DESCRIPTION - ONSET: ONSET: ON-GOING

## 2018-04-23 ENCOUNTER — TELEPHONE (OUTPATIENT)
Dept: FAMILY MEDICINE CLINIC | Age: 53
End: 2018-04-23

## 2018-04-23 DIAGNOSIS — S82.141A CLOSED FRACTURE OF RIGHT TIBIAL PLATEAU, INITIAL ENCOUNTER: Primary | ICD-10-CM

## 2018-10-02 ENCOUNTER — OFFICE VISIT (OUTPATIENT)
Dept: FAMILY MEDICINE CLINIC | Age: 53
End: 2018-10-02
Payer: COMMERCIAL

## 2018-10-02 VITALS
SYSTOLIC BLOOD PRESSURE: 132 MMHG | DIASTOLIC BLOOD PRESSURE: 84 MMHG | HEIGHT: 63 IN | WEIGHT: 193 LBS | RESPIRATION RATE: 16 BRPM | BODY MASS INDEX: 34.2 KG/M2 | TEMPERATURE: 97.8 F | HEART RATE: 72 BPM

## 2018-10-02 DIAGNOSIS — Z12.39 SCREENING FOR BREAST CANCER: ICD-10-CM

## 2018-10-02 DIAGNOSIS — M17.0 ARTHRITIS OF BOTH KNEES: Primary | ICD-10-CM

## 2018-10-02 DIAGNOSIS — F41.1 GAD (GENERALIZED ANXIETY DISORDER): ICD-10-CM

## 2018-10-02 DIAGNOSIS — F33.1 MODERATE EPISODE OF RECURRENT MAJOR DEPRESSIVE DISORDER (HCC): ICD-10-CM

## 2018-10-02 PROCEDURE — G8484 FLU IMMUNIZE NO ADMIN: HCPCS | Performed by: NURSE PRACTITIONER

## 2018-10-02 PROCEDURE — 1036F TOBACCO NON-USER: CPT | Performed by: NURSE PRACTITIONER

## 2018-10-02 PROCEDURE — G8427 DOCREV CUR MEDS BY ELIG CLIN: HCPCS | Performed by: NURSE PRACTITIONER

## 2018-10-02 PROCEDURE — 3017F COLORECTAL CA SCREEN DOC REV: CPT | Performed by: NURSE PRACTITIONER

## 2018-10-02 PROCEDURE — 99214 OFFICE O/P EST MOD 30 MIN: CPT | Performed by: NURSE PRACTITIONER

## 2018-10-02 PROCEDURE — G8417 CALC BMI ABV UP PARAM F/U: HCPCS | Performed by: NURSE PRACTITIONER

## 2018-10-02 RX ORDER — VENLAFAXINE HYDROCHLORIDE 75 MG/1
CAPSULE, EXTENDED RELEASE ORAL
Qty: 90 CAPSULE | Refills: 3 | Status: SHIPPED | OUTPATIENT
Start: 2018-10-02 | End: 2019-07-29

## 2018-10-02 RX ORDER — NAPROXEN 375 MG/1
375 TABLET ORAL 2 TIMES DAILY WITH MEALS
Qty: 60 TABLET | Refills: 3 | Status: SHIPPED | OUTPATIENT
Start: 2018-10-02 | End: 2019-07-29 | Stop reason: HOSPADM

## 2018-10-02 RX ORDER — NAPROXEN 375 MG/1
375 TABLET ORAL 2 TIMES DAILY WITH MEALS
COMMUNITY
End: 2019-07-29 | Stop reason: HOSPADM

## 2018-10-02 RX ORDER — VENLAFAXINE HYDROCHLORIDE 150 MG/1
CAPSULE, EXTENDED RELEASE ORAL
Qty: 90 CAPSULE | Refills: 3 | Status: SHIPPED | OUTPATIENT
Start: 2018-10-02 | End: 2019-07-29
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
## 2018-11-13 ENCOUNTER — HOSPITAL ENCOUNTER (OUTPATIENT)
Dept: WOMENS IMAGING | Age: 53
Discharge: HOME | End: 2018-11-13
Payer: COMMERCIAL

## 2018-11-13 DIAGNOSIS — Z12.39 SCREENING FOR BREAST CANCER: ICD-10-CM

## 2018-11-13 PROCEDURE — 77067 SCR MAMMO BI INCL CAD: CPT

## 2019-07-29 ENCOUNTER — HOSPITAL ENCOUNTER (EMERGENCY)
Age: 54
Discharge: HOME | End: 2019-07-29
Payer: COMMERCIAL

## 2019-07-29 VITALS
WEIGHT: 191.4 LBS | SYSTOLIC BLOOD PRESSURE: 137 MMHG | RESPIRATION RATE: 14 BRPM | DIASTOLIC BLOOD PRESSURE: 80 MMHG | HEIGHT: 64 IN | OXYGEN SATURATION: 99 % | TEMPERATURE: 98.9 F | BODY MASS INDEX: 32.68 KG/M2 | HEART RATE: 53 BPM

## 2019-07-29 DIAGNOSIS — J20.9 ACUTE BRONCHITIS, UNSPECIFIED ORGANISM: Primary | ICD-10-CM

## 2019-07-29 DIAGNOSIS — M94.0 ACUTE COSTOCHONDRITIS: ICD-10-CM

## 2019-07-29 DIAGNOSIS — J04.0 ACUTE LARYNGITIS: ICD-10-CM

## 2019-07-29 PROCEDURE — 99214 OFFICE O/P EST MOD 30 MIN: CPT | Performed by: NURSE PRACTITIONER

## 2019-07-29 PROCEDURE — 99213 OFFICE O/P EST LOW 20 MIN: CPT

## 2019-07-29 RX ORDER — PROMETHAZINE HYDROCHLORIDE AND CODEINE PHOSPHATE 6.25; 1 MG/5ML; MG/5ML
5 SYRUP ORAL 4 TIMES DAILY PRN
Qty: 60 ML | Refills: 0 | Status: SHIPPED | OUTPATIENT
Start: 2019-07-29 | End: 2019-08-01

## 2019-07-29 RX ORDER — DOXYCYCLINE HYCLATE 100 MG/1
100 CAPSULE ORAL 2 TIMES DAILY
Qty: 14 CAPSULE | Refills: 0 | Status: SHIPPED | OUTPATIENT
Start: 2019-07-29 | End: 2019-08-05

## 2019-07-29 RX ORDER — ACETAMINOPHEN 325 MG/1
650 TABLET ORAL EVERY 6 HOURS PRN
COMMUNITY
End: 2020-10-23

## 2019-07-29 RX ORDER — ALBUTEROL SULFATE 90 UG/1
2 AEROSOL, METERED RESPIRATORY (INHALATION) EVERY 6 HOURS PRN
Qty: 1 INHALER | Refills: 0 | Status: SHIPPED | OUTPATIENT
Start: 2019-07-29 | End: 2019-11-11

## 2019-07-29 RX ORDER — PREDNISONE 20 MG/1
20 TABLET ORAL 2 TIMES DAILY
Qty: 10 TABLET | Refills: 0 | Status: SHIPPED | OUTPATIENT
Start: 2019-07-29 | End: 2019-08-03

## 2019-07-29 ASSESSMENT — ENCOUNTER SYMPTOMS
COUGH: 1
VOMITING: 0
SHORTNESS OF BREATH: 0
STRIDOR: 0
ABDOMINAL PAIN: 0
SINUS PAIN: 0
VOICE CHANGE: 1
RHINORRHEA: 1
SINUS PRESSURE: 0
NAUSEA: 0
SINUS CONGESTION: 1
CHEST TIGHTNESS: 1
SORE THROAT: 0
WHEEZING: 1
TROUBLE SWALLOWING: 0
EYE DISCHARGE: 0
DIARRHEA: 0

## 2019-07-29 ASSESSMENT — PAIN SCALES - GENERAL: PAINLEVEL_OUTOF10: 8

## 2019-07-29 ASSESSMENT — PAIN DESCRIPTION - LOCATION: LOCATION: CHEST

## 2019-07-29 ASSESSMENT — PAIN - FUNCTIONAL ASSESSMENT: PAIN_FUNCTIONAL_ASSESSMENT: PREVENTS OR INTERFERES SOME ACTIVE ACTIVITIES AND ADLS

## 2019-10-17 ENCOUNTER — TELEPHONE (OUTPATIENT)
Dept: FAMILY MEDICINE CLINIC | Age: 54
End: 2019-10-17

## 2019-10-17 ENCOUNTER — OFFICE VISIT (OUTPATIENT)
Dept: FAMILY MEDICINE CLINIC | Age: 54
End: 2019-10-17
Payer: COMMERCIAL

## 2019-10-17 ENCOUNTER — HOSPITAL ENCOUNTER (OUTPATIENT)
Dept: GENERAL RADIOLOGY | Age: 54
Discharge: HOME | End: 2019-10-17
Payer: COMMERCIAL

## 2019-10-17 ENCOUNTER — HOSPITAL ENCOUNTER (OUTPATIENT)
Age: 54
Discharge: HOME | End: 2019-10-17
Payer: COMMERCIAL

## 2019-10-17 VITALS
BODY MASS INDEX: 34.82 KG/M2 | HEART RATE: 60 BPM | DIASTOLIC BLOOD PRESSURE: 82 MMHG | TEMPERATURE: 98.8 F | RESPIRATION RATE: 16 BRPM | HEIGHT: 62 IN | WEIGHT: 189.2 LBS | SYSTOLIC BLOOD PRESSURE: 132 MMHG

## 2019-10-17 DIAGNOSIS — R35.0 URINARY FREQUENCY: ICD-10-CM

## 2019-10-17 DIAGNOSIS — Z13.1 SCREENING FOR DIABETES MELLITUS (DM): ICD-10-CM

## 2019-10-17 DIAGNOSIS — R35.0 URINARY FREQUENCY: Primary | ICD-10-CM

## 2019-10-17 DIAGNOSIS — M17.0 PRIMARY OSTEOARTHRITIS OF BOTH KNEES: ICD-10-CM

## 2019-10-17 DIAGNOSIS — N39.41 URGE INCONTINENCE: ICD-10-CM

## 2019-10-17 LAB
ANION GAP SERPL CALCULATED.3IONS-SCNC: 13 MEQ/L (ref 8–16)
AVERAGE GLUCOSE: 111 MG/DL (ref 70–126)
BACTERIA: ABNORMAL /HPF
BILIRUBIN URINE: NEGATIVE
BLOOD, URINE: NEGATIVE
BUN BLDV-MCNC: 11 MG/DL (ref 7–22)
CALCIUM SERPL-MCNC: 9.4 MG/DL (ref 8.5–10.5)
CASTS 2: ABNORMAL /LPF
CASTS UA: ABNORMAL /LPF
CHARACTER, URINE: ABNORMAL
CHLORIDE BLD-SCNC: 104 MEQ/L (ref 98–111)
CO2: 27 MEQ/L (ref 23–33)
COLOR: YELLOW
CREAT SERPL-MCNC: 0.8 MG/DL (ref 0.4–1.2)
CRYSTALS, UA: ABNORMAL
EPITHELIAL CELLS, UA: ABNORMAL /HPF
GFR SERPL CREATININE-BSD FRML MDRD: 74 ML/MIN/1.73M2
GLUCOSE BLD-MCNC: 115 MG/DL (ref 70–108)
GLUCOSE URINE: NEGATIVE MG/DL
HBA1C MFR BLD: 5.7 % (ref 4.4–6.4)
KETONES, URINE: NEGATIVE
LEUKOCYTE ESTERASE, URINE: ABNORMAL
MISCELLANEOUS 2: ABNORMAL
NITRITE, URINE: NEGATIVE
PH UA: 5.5 (ref 5–9)
POTASSIUM SERPL-SCNC: 3.8 MEQ/L (ref 3.5–5.2)
PROTEIN UA: NEGATIVE
RBC URINE: ABNORMAL /HPF
RENAL EPITHELIAL, UA: ABNORMAL
SODIUM BLD-SCNC: 144 MEQ/L (ref 135–145)
SPECIFIC GRAVITY, URINE: 1.02 (ref 1–1.03)
UROBILINOGEN, URINE: 0.2 EU/DL (ref 0–1)
WBC UA: > 100 /HPF
YEAST: ABNORMAL

## 2019-10-17 PROCEDURE — 36415 COLL VENOUS BLD VENIPUNCTURE: CPT

## 2019-10-17 PROCEDURE — G8427 DOCREV CUR MEDS BY ELIG CLIN: HCPCS | Performed by: NURSE PRACTITIONER

## 2019-10-17 PROCEDURE — 73562 X-RAY EXAM OF KNEE 3: CPT

## 2019-10-17 PROCEDURE — 87077 CULTURE AEROBIC IDENTIFY: CPT

## 2019-10-17 PROCEDURE — 80048 BASIC METABOLIC PNL TOTAL CA: CPT

## 2019-10-17 PROCEDURE — G8417 CALC BMI ABV UP PARAM F/U: HCPCS | Performed by: NURSE PRACTITIONER

## 2019-10-17 PROCEDURE — G8484 FLU IMMUNIZE NO ADMIN: HCPCS | Performed by: NURSE PRACTITIONER

## 2019-10-17 PROCEDURE — 81001 URINALYSIS AUTO W/SCOPE: CPT

## 2019-10-17 PROCEDURE — 3017F COLORECTAL CA SCREEN DOC REV: CPT | Performed by: NURSE PRACTITIONER

## 2019-10-17 PROCEDURE — 87186 SC STD MICRODIL/AGAR DIL: CPT

## 2019-10-17 PROCEDURE — 83036 HEMOGLOBIN GLYCOSYLATED A1C: CPT

## 2019-10-17 PROCEDURE — 99214 OFFICE O/P EST MOD 30 MIN: CPT | Performed by: NURSE PRACTITIONER

## 2019-10-17 PROCEDURE — 87086 URINE CULTURE/COLONY COUNT: CPT

## 2019-10-17 PROCEDURE — 1036F TOBACCO NON-USER: CPT | Performed by: NURSE PRACTITIONER

## 2019-10-17 RX ORDER — OXYBUTYNIN CHLORIDE 5 MG/1
5 TABLET, EXTENDED RELEASE ORAL DAILY
Qty: 90 TABLET | Refills: 0 | Status: SHIPPED | OUTPATIENT
Start: 2019-10-17 | End: 2020-02-04

## 2019-10-17 RX ORDER — MELOXICAM 7.5 MG/1
7.5 TABLET ORAL DAILY
Qty: 30 TABLET | Refills: 3 | Status: SHIPPED | OUTPATIENT
Start: 2019-10-17 | End: 2020-02-04

## 2019-10-17 RX ORDER — DOCUSATE SODIUM 100 MG/1
100 CAPSULE, LIQUID FILLED ORAL DAILY PRN
Qty: 30 CAPSULE | Refills: 1 | Status: SHIPPED | OUTPATIENT
Start: 2019-10-17 | End: 2019-11-09

## 2019-10-18 ENCOUNTER — TELEPHONE (OUTPATIENT)
Dept: FAMILY MEDICINE CLINIC | Age: 54
End: 2019-10-18

## 2019-10-18 DIAGNOSIS — N39.0 RECURRENT UTI: Primary | ICD-10-CM

## 2019-10-18 PROBLEM — Z13.1 SCREENING FOR DIABETES MELLITUS (DM): Status: RESOLVED | Noted: 2019-10-17 | Resolved: 2019-10-18

## 2019-10-18 PROBLEM — R73.03 PREDIABETES: Status: ACTIVE | Noted: 2019-10-18

## 2019-10-18 RX ORDER — SULFAMETHOXAZOLE AND TRIMETHOPRIM 800; 160 MG/1; MG/1
1 TABLET ORAL 2 TIMES DAILY
Qty: 14 TABLET | Refills: 0 | Status: SHIPPED | OUTPATIENT
Start: 2019-10-18 | End: 2019-10-25

## 2019-10-19 LAB
ORGANISM: ABNORMAL
URINE CULTURE REFLEX: ABNORMAL

## 2019-10-21 ENCOUNTER — TELEPHONE (OUTPATIENT)
Dept: FAMILY MEDICINE CLINIC | Age: 54
End: 2019-10-21

## 2019-10-21 DIAGNOSIS — K58.0 IRRITABLE BOWEL SYNDROME WITH DIARRHEA: ICD-10-CM

## 2019-10-21 RX ORDER — LOPERAMIDE HYDROCHLORIDE 2 MG/1
2 CAPSULE ORAL 4 TIMES DAILY PRN
Qty: 90 CAPSULE | Refills: 0 | Status: SHIPPED | OUTPATIENT
Start: 2019-10-21 | End: 2020-02-10 | Stop reason: SDUPTHER

## 2019-11-09 ENCOUNTER — HOSPITAL ENCOUNTER (EMERGENCY)
Age: 54
Discharge: HOME | End: 2019-11-09
Payer: COMMERCIAL

## 2019-11-09 VITALS
WEIGHT: 186 LBS | RESPIRATION RATE: 16 BRPM | HEIGHT: 63 IN | OXYGEN SATURATION: 96 % | TEMPERATURE: 98.4 F | DIASTOLIC BLOOD PRESSURE: 68 MMHG | HEART RATE: 52 BPM | BODY MASS INDEX: 32.96 KG/M2 | SYSTOLIC BLOOD PRESSURE: 133 MMHG

## 2019-11-09 DIAGNOSIS — N30.01 ACUTE CYSTITIS WITH HEMATURIA: Primary | ICD-10-CM

## 2019-11-09 LAB
BILIRUBIN URINE: ABNORMAL
BLOOD, URINE: ABNORMAL
CHARACTER, URINE: ABNORMAL
COLOR: ABNORMAL
GLUCOSE, URINE: NEGATIVE MG/DL
KETONES, URINE: 15
LEUKOCYTES, UA: ABNORMAL
NITRATE, UA: NEGATIVE
PH UA: 5 (ref 5–9)
PROTEIN UA: 100 MG/DL
REFLEX TO URINE C & S: ABNORMAL
SPECIFIC GRAVITY UA: >= 1.03 (ref 1–1.03)
UROBILINOGEN, URINE: 0.2 EU/DL (ref 0–1)

## 2019-11-09 PROCEDURE — 99213 OFFICE O/P EST LOW 20 MIN: CPT

## 2019-11-09 PROCEDURE — 81003 URINALYSIS AUTO W/O SCOPE: CPT

## 2019-11-09 PROCEDURE — 99213 OFFICE O/P EST LOW 20 MIN: CPT | Performed by: NURSE PRACTITIONER

## 2019-11-09 PROCEDURE — 87186 SC STD MICRODIL/AGAR DIL: CPT

## 2019-11-09 PROCEDURE — 87077 CULTURE AEROBIC IDENTIFY: CPT

## 2019-11-09 PROCEDURE — 87086 URINE CULTURE/COLONY COUNT: CPT

## 2019-11-09 RX ORDER — SULFAMETHOXAZOLE AND TRIMETHOPRIM 800; 160 MG/1; MG/1
1 TABLET ORAL 2 TIMES DAILY
Qty: 10 TABLET | Refills: 0 | Status: SHIPPED | OUTPATIENT
Start: 2019-11-09 | End: 2019-11-14

## 2019-11-09 ASSESSMENT — ENCOUNTER SYMPTOMS
CONSTIPATION: 0
NAUSEA: 0
SHORTNESS OF BREATH: 0
COUGH: 0
EYE DISCHARGE: 0
ABDOMINAL PAIN: 0
ALLERGIC/IMMUNOLOGIC NEGATIVE: 1
TROUBLE SWALLOWING: 0
EYE REDNESS: 0
WHEEZING: 0
EYE PAIN: 0
VOMITING: 0
SORE THROAT: 0
RHINORRHEA: 0
BACK PAIN: 0
DIARRHEA: 0

## 2019-11-11 ENCOUNTER — OFFICE VISIT (OUTPATIENT)
Dept: UROLOGY | Age: 54
End: 2019-11-11
Payer: COMMERCIAL

## 2019-11-11 VITALS
WEIGHT: 185 LBS | HEIGHT: 63 IN | BODY MASS INDEX: 32.78 KG/M2 | DIASTOLIC BLOOD PRESSURE: 82 MMHG | SYSTOLIC BLOOD PRESSURE: 128 MMHG

## 2019-11-11 DIAGNOSIS — N39.0 URINARY TRACT INFECTION WITHOUT HEMATURIA, SITE UNSPECIFIED: ICD-10-CM

## 2019-11-11 DIAGNOSIS — R10.9 FLANK PAIN: Primary | ICD-10-CM

## 2019-11-11 LAB
BILIRUBIN URINE: ABNORMAL
BLOOD URINE, POC: NEGATIVE
CHARACTER, URINE: CLEAR
COLOR, URINE: YELLOW
GLUCOSE URINE: NEGATIVE MG/DL
KETONES, URINE: ABNORMAL
LEUKOCYTE CLUMPS, URINE: NEGATIVE
NITRITE, URINE: NEGATIVE
ORGANISM: ABNORMAL
PH, URINE: 7 (ref 5–9)
PROTEIN, URINE: NEGATIVE MG/DL
SPECIFIC GRAVITY, URINE: 1.02 (ref 1–1.03)
URINE CULTURE REFLEX: ABNORMAL
UROBILINOGEN, URINE: 1 EU/DL (ref 0–1)

## 2019-11-11 PROCEDURE — G8484 FLU IMMUNIZE NO ADMIN: HCPCS | Performed by: NURSE PRACTITIONER

## 2019-11-11 PROCEDURE — G8417 CALC BMI ABV UP PARAM F/U: HCPCS | Performed by: NURSE PRACTITIONER

## 2019-11-11 PROCEDURE — 3017F COLORECTAL CA SCREEN DOC REV: CPT | Performed by: NURSE PRACTITIONER

## 2019-11-11 PROCEDURE — 1036F TOBACCO NON-USER: CPT | Performed by: NURSE PRACTITIONER

## 2019-11-11 PROCEDURE — 99213 OFFICE O/P EST LOW 20 MIN: CPT | Performed by: NURSE PRACTITIONER

## 2019-11-11 PROCEDURE — 81003 URINALYSIS AUTO W/O SCOPE: CPT | Performed by: NURSE PRACTITIONER

## 2019-11-11 PROCEDURE — G8427 DOCREV CUR MEDS BY ELIG CLIN: HCPCS | Performed by: NURSE PRACTITIONER

## 2019-11-11 RX ORDER — PHENAZOPYRIDINE HYDROCHLORIDE 100 MG/1
100 TABLET, FILM COATED ORAL 3 TIMES DAILY PRN
Qty: 9 TABLET | Refills: 0 | Status: SHIPPED | OUTPATIENT
Start: 2019-11-11 | End: 2019-11-14

## 2019-11-11 RX ORDER — KETOROLAC TROMETHAMINE 10 MG/1
10 TABLET, FILM COATED ORAL EVERY 6 HOURS PRN
Qty: 20 TABLET | Refills: 0 | Status: SHIPPED | OUTPATIENT
Start: 2019-11-11 | End: 2020-02-10

## 2019-11-12 ENCOUNTER — TELEPHONE (OUTPATIENT)
Dept: UROLOGY | Age: 54
End: 2019-11-12

## 2019-11-14 ENCOUNTER — TELEPHONE (OUTPATIENT)
Dept: UROLOGY | Age: 54
End: 2019-11-14

## 2019-11-20 ENCOUNTER — TELEPHONE (OUTPATIENT)
Dept: UROLOGY | Age: 54
End: 2019-11-20

## 2019-11-26 ENCOUNTER — HOSPITAL ENCOUNTER (OUTPATIENT)
Dept: CT IMAGING | Age: 54
Discharge: HOME | End: 2019-11-26
Payer: COMMERCIAL

## 2019-11-26 DIAGNOSIS — R10.9 FLANK PAIN: ICD-10-CM

## 2019-11-26 PROCEDURE — 6360000004 HC RX CONTRAST MEDICATION: Performed by: NURSE PRACTITIONER

## 2019-11-26 PROCEDURE — 74178 CT ABD&PLV WO CNTR FLWD CNTR: CPT

## 2019-11-26 RX ADMIN — IOPAMIDOL 85 ML: 755 INJECTION, SOLUTION INTRAVENOUS at 13:25

## 2019-12-02 ENCOUNTER — OFFICE VISIT (OUTPATIENT)
Dept: UROLOGY | Age: 54
End: 2019-12-02
Payer: COMMERCIAL

## 2019-12-02 ENCOUNTER — TELEPHONE (OUTPATIENT)
Dept: UROLOGY | Age: 54
End: 2019-12-02

## 2019-12-02 VITALS — HEIGHT: 63 IN | WEIGHT: 185 LBS | BODY MASS INDEX: 32.78 KG/M2

## 2019-12-02 DIAGNOSIS — R10.9 FLANK PAIN: Primary | ICD-10-CM

## 2019-12-02 DIAGNOSIS — N39.0 URINARY TRACT INFECTION WITHOUT HEMATURIA, SITE UNSPECIFIED: Primary | ICD-10-CM

## 2019-12-02 LAB
BILIRUBIN URINE: NEGATIVE
BLOOD URINE, POC: NORMAL
CHARACTER, URINE: CLEAR
COLOR, URINE: YELLOW
GLUCOSE URINE: NEGATIVE MG/DL
KETONES, URINE: NEGATIVE
LEUKOCYTE CLUMPS, URINE: NEGATIVE
NITRITE, URINE: NEGATIVE
PH, URINE: 5.5 (ref 5–9)
PROTEIN, URINE: NEGATIVE MG/DL
SPECIFIC GRAVITY, URINE: 1.01 (ref 1–1.03)
UROBILINOGEN, URINE: 0.2 EU/DL (ref 0–1)

## 2019-12-02 PROCEDURE — G8427 DOCREV CUR MEDS BY ELIG CLIN: HCPCS | Performed by: NURSE PRACTITIONER

## 2019-12-02 PROCEDURE — G8417 CALC BMI ABV UP PARAM F/U: HCPCS | Performed by: NURSE PRACTITIONER

## 2019-12-02 PROCEDURE — 99214 OFFICE O/P EST MOD 30 MIN: CPT | Performed by: NURSE PRACTITIONER

## 2019-12-02 PROCEDURE — 3017F COLORECTAL CA SCREEN DOC REV: CPT | Performed by: NURSE PRACTITIONER

## 2019-12-02 PROCEDURE — G8484 FLU IMMUNIZE NO ADMIN: HCPCS | Performed by: NURSE PRACTITIONER

## 2019-12-02 PROCEDURE — 1036F TOBACCO NON-USER: CPT | Performed by: NURSE PRACTITIONER

## 2019-12-03 DIAGNOSIS — R10.9 FLANK PAIN: Primary | ICD-10-CM

## 2019-12-04 ENCOUNTER — HOSPITAL ENCOUNTER (OUTPATIENT)
Dept: NUCLEAR MEDICINE | Age: 54
Discharge: HOME | End: 2019-12-04
Payer: COMMERCIAL

## 2019-12-04 VITALS — WEIGHT: 190 LBS | BODY MASS INDEX: 33.66 KG/M2

## 2019-12-04 DIAGNOSIS — R10.9 FLANK PAIN: ICD-10-CM

## 2019-12-04 LAB — POC CREATININE WHOLE BLOOD: 0.8 MG/DL (ref 0.5–1.2)

## 2019-12-04 PROCEDURE — 78708 K FLOW/FUNCT IMAGE W/DRUG: CPT

## 2019-12-04 PROCEDURE — 3430000000 HC RX DIAGNOSTIC RADIOPHARMACEUTICAL: Performed by: NURSE PRACTITIONER

## 2019-12-04 PROCEDURE — 6360000002 HC RX W HCPCS

## 2019-12-04 PROCEDURE — 2709999900 HC NON-CHARGEABLE SUPPLY

## 2019-12-04 PROCEDURE — 82565 ASSAY OF CREATININE: CPT

## 2019-12-04 PROCEDURE — A9562 TC99M MERTIATIDE: HCPCS | Performed by: NURSE PRACTITIONER

## 2019-12-04 RX ORDER — FUROSEMIDE 10 MG/ML
40 INJECTION INTRAMUSCULAR; INTRAVENOUS ONCE
Status: COMPLETED | OUTPATIENT
Start: 2019-12-04 | End: 2019-12-04

## 2019-12-04 RX ADMIN — Medication 9.7 MILLICURIE: at 13:28

## 2019-12-04 RX ADMIN — FUROSEMIDE 40 MG: 10 INJECTION INTRAMUSCULAR; INTRAVENOUS at 13:41

## 2020-02-04 RX ORDER — OXYBUTYNIN CHLORIDE 5 MG/1
TABLET, EXTENDED RELEASE ORAL
Qty: 90 TABLET | Refills: 0 | Status: SHIPPED | OUTPATIENT
Start: 2020-02-04 | End: 2020-02-10

## 2020-02-04 RX ORDER — MELOXICAM 7.5 MG/1
TABLET ORAL
Qty: 30 TABLET | Refills: 0 | Status: SHIPPED | OUTPATIENT
Start: 2020-02-04 | End: 2020-02-10

## 2020-02-10 ENCOUNTER — TELEPHONE (OUTPATIENT)
Dept: FAMILY MEDICINE CLINIC | Age: 55
End: 2020-02-10

## 2020-02-10 ENCOUNTER — OFFICE VISIT (OUTPATIENT)
Dept: FAMILY MEDICINE CLINIC | Age: 55
End: 2020-02-10
Payer: COMMERCIAL

## 2020-02-10 VITALS
WEIGHT: 195.2 LBS | RESPIRATION RATE: 16 BRPM | HEART RATE: 51 BPM | TEMPERATURE: 99.4 F | BODY MASS INDEX: 34.59 KG/M2 | HEIGHT: 63 IN | SYSTOLIC BLOOD PRESSURE: 132 MMHG | DIASTOLIC BLOOD PRESSURE: 68 MMHG

## 2020-02-10 PROBLEM — S52.612A FRACTURE OF STYLOID PROCESS OF LEFT ULNA: Status: RESOLVED | Noted: 2018-04-13 | Resolved: 2020-02-10

## 2020-02-10 PROBLEM — S82.141A FRACTURE OF RIGHT TIBIAL PLATEAU: Status: RESOLVED | Noted: 2018-04-13 | Resolved: 2020-02-10

## 2020-02-10 PROBLEM — M17.0 PRIMARY OSTEOARTHRITIS OF BOTH KNEES: Status: ACTIVE | Noted: 2020-02-10

## 2020-02-10 PROBLEM — N39.41 URGE INCONTINENCE: Status: RESOLVED | Noted: 2019-10-17 | Resolved: 2020-02-10
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
## 2020-02-10 PROBLEM — S52.509A FRACTURE OF DISTAL END OF RADIUS: Status: RESOLVED | Noted: 2018-04-13 | Resolved: 2020-02-10

## 2020-02-10 PROBLEM — S82.141A CLOSED FRACTURE OF TIBIAL PLATEAU, RIGHT, INITIAL ENCOUNTER: Status: RESOLVED | Noted: 2018-04-13 | Resolved: 2020-02-10

## 2020-02-10 PROCEDURE — 3017F COLORECTAL CA SCREEN DOC REV: CPT | Performed by: NURSE PRACTITIONER

## 2020-02-10 PROCEDURE — G8484 FLU IMMUNIZE NO ADMIN: HCPCS | Performed by: NURSE PRACTITIONER

## 2020-02-10 PROCEDURE — 1036F TOBACCO NON-USER: CPT | Performed by: NURSE PRACTITIONER

## 2020-02-10 PROCEDURE — G8427 DOCREV CUR MEDS BY ELIG CLIN: HCPCS | Performed by: NURSE PRACTITIONER

## 2020-02-10 PROCEDURE — G8417 CALC BMI ABV UP PARAM F/U: HCPCS | Performed by: NURSE PRACTITIONER

## 2020-02-10 PROCEDURE — 99396 PREV VISIT EST AGE 40-64: CPT | Performed by: NURSE PRACTITIONER

## 2020-02-10 RX ORDER — LOPERAMIDE HYDROCHLORIDE 2 MG/1
2 CAPSULE ORAL 4 TIMES DAILY PRN
Qty: 90 CAPSULE | Refills: 1 | Status: SHIPPED | OUTPATIENT
Start: 2020-02-10 | End: 2021-09-28 | Stop reason: SDUPTHER

## 2020-02-10 RX ORDER — OXYBUTYNIN CHLORIDE 10 MG/1
10 TABLET, EXTENDED RELEASE ORAL DAILY
Qty: 90 TABLET | Refills: 3 | Status: SHIPPED | OUTPATIENT
Start: 2020-02-10 | End: 2020-10-23

## 2020-02-10 RX ORDER — OXYBUTYNIN CHLORIDE 5 MG/1
TABLET, EXTENDED RELEASE ORAL
Qty: 90 TABLET | Refills: 0 | Status: CANCELLED | OUTPATIENT
Start: 2020-02-10

## 2020-02-10 RX ORDER — MELOXICAM 7.5 MG/1
TABLET ORAL
Qty: 30 TABLET | Refills: 0 | Status: CANCELLED | OUTPATIENT
Start: 2020-02-10

## 2020-02-10 RX ORDER — MELOXICAM 15 MG/1
15 TABLET ORAL DAILY
Qty: 90 TABLET | Refills: 3 | Status: SHIPPED | OUTPATIENT
Start: 2020-02-10 | End: 2021-09-28

## 2020-02-10 NOTE — PROGRESS NOTES
use: No    Sexual activity: Not on file   Lifestyle    Physical activity:     Days per week: Not on file     Minutes per session: Not on file    Stress: Not on file   Relationships    Social connections:     Talks on phone: Not on file     Gets together: Not on file     Attends [employer] service: Not on file     Active member of club or organization: Not on file     Attends meetings of clubs or organizations: Not on file     Relationship status: Not on file    Intimate partner violence:     Fear of current or ex partner: Not on file     Emotionally abused: Not on file     Physically abused: Not on file     Forced sexual activity: Not on file   Other Topics Concern    Not on file   Social History Narrative    Not on file       Family History   Problem Relation Age of Onset    Diabetes Mother     High Blood Pressure Mother     Cancer Mother     Kidney Disease Mother     Mental Illness Father          I have reviewed the patient's past medical history, past surgical history, allergies, medications, social and family history and I have made updates where appropriate.       Review of Systems  Positive responses are highlighted in bold    Constitutional:  Fever, Chills, Night Sweats, Fatigue, Unexpected changes in weight  Eyes:  Eye discharge, Eye pain, Eye redness, Visual disturbances   HENT:  Ear pain, Tinnitus, Nosebleeds, Trouble swallowing, Hearing loss, Sore throat  Cardiovascular:  Chest Pain, Palpitations, Orthopnea, Paroxysmal Nocturnal Dyspnea  Respiratory:  Cough, Wheezing, Shortness of breath, Chest tightness, Apnea  Gastrointestinal:  Nausea, Vomiting, Diarrhea, Constipation, Heartburn, Blood in stool  Genitourinary:  Difficulty or painful urination, Flank pain, Change in frequency, Urgency  Skin:  Color change, Rash, Itching, Wound  Psychiatric:  Hallucinations, Anxiety, Depression, Suicidal ideation  Hematological:  Enlarged glands, Easy bleeding, Easily bruising  Musculoskeletal:  Joint pain, Back pain, Gait problems, Joint swelling, Myalgias  Neurological:  Dizziness, Headaches, Presyncope, Numbness, Seizures, Tremors  Allergy:  Environmental allergies, Food allergies  Endocrine:  Heat Intolerance, Cold Intolerance, Polydipsia, Polyphagia, Polyuria      Lab Results   Component Value Date     10/17/2019    K 3.8 10/17/2019     10/17/2019    CO2 27 10/17/2019    BUN 11 10/17/2019    CREATININE 0.8 10/17/2019    GLUCOSE 115 (H) 10/17/2019    CALCIUM 9.4 10/17/2019    PROT 7.1 04/13/2018    LABALBU 4.4 04/13/2018    BILITOT 0.7 04/13/2018    ALKPHOS 81 04/13/2018    AST 18 04/13/2018    ALT 16 04/13/2018    LABGLOM 74 (A) 10/17/2019       Lab Results   Component Value Date    WBC 9.7 04/13/2018    HGB 14.1 04/13/2018    HCT 41.4 04/13/2018    MCV 91.5 04/13/2018     04/13/2018     Lab Results   Component Value Date    TSH 2.270 09/10/2016       PHYSICAL EXAM:  Vitals:    02/10/20 1147   BP: 132/68   Pulse: 51   Resp: 16   Temp: 99.4 °F (37.4 °C)   TempSrc: Oral   Weight: 195 lb 3.2 oz (88.5 kg)   Height: 5' 3\" (1.6 m)     Body mass index is 34.58 kg/m². VS Reviewed  General Appearance: A&O x 3, No acute distress,well developed and well- nourished  Head: normocephalic and atraumatic  Eyes: pupils equal, round, and reactive to light, extraocular eye movements intact, conjunctivae and eye lids without erythema  Neck: supple and non-tender without mass, no thyromegaly or thyroid nodules, no cervical lymphadenopathy  Pulmonary/Chest: clear to auscultation bilaterally- no wheezes, rales or rhonchi, normal air movement, no respiratory distress or retractions  Cardiovascular: S1 and S2 auscultated w/ RRR. No murmurs, rubs, clicks, or gallops, distal pulses intact. Abdomen: soft, non-tender, non-distended, bowl sounds physiologic,  no rebound or guarding, no masses or hernias noted. Liver and spleen without enlargement.    Extremities: no cyanosis, clubbing or edema of the lower extremities  Musculoskeletal: no bony abnormality or joint effusion  Neuro:  Alert, 5/5 strength globally and symmetrically  Psych: Affect flat. Mood normal. Thought process is normal without evidence of depression or psychosis. Good insight and appropriate interaction. Cognition and memory appear to be intact. Skin: warm and dry, no rash or erythema  Lymph:  No cervical, auricular or supraclavicular lymph nodes palpated    ASSESSMENT & PLAN  Christen was seen today for medication refill. Diagnoses and all orders for this visit:    Irritable bowel syndrome with diarrhea  -     loperamide (IMODIUM) 2 MG capsule; Take 1 capsule by mouth 4 times daily as needed for Diarrhea    Primary osteoarthritis of both knees  -     meloxicam (MOBIC) 15 MG tablet; Take 1 tablet by mouth daily    Urge incontinence  -     oxybutynin (DITROPAN-XL) 10 MG extended release tablet; Take 1 tablet by mouth daily      - con't Imodium prn  - increase Ditropan and Mobic  - see back prn    DISPOSITION    Return if symptoms worsen or fail to improve. Christen released without restrictions. PATIENT COUNSELING    Counseling was provided today regarding the following topics: Healthy eating habits, Regular exercise, substance abuse and healthy sleep habits. Christen received counseling on the following healthy behaviors: medication adherence    Patient given educational materials on: See Attached    I have instructed Christen to complete a self tracking handout on none and instructed them to bring it with them to her next appointment. Barriers to learning and self management: none    Discussed use, benefit, and side effects of prescribed medications. Barriers to medication compliance addressed. All patient questions answered. Pt voiced understanding.        Electronically signed by OPAL Phelan CNP on 2/10/2020 at 12:02 PM

## 2020-02-10 NOTE — TELEPHONE ENCOUNTER
Pt called asking if her apt today could be billed as a yearly check up/physical. Pt states with having [employer], they get a kick back for a yearly exam.   Please advise.

## 2020-02-10 NOTE — TELEPHONE ENCOUNTER
That should be fine. I addressed chronic conditions and they were relatively stable, so it should be ok. She might have to come in and sign an ABN though.

## 2020-02-13 ENCOUNTER — HOSPITAL ENCOUNTER (EMERGENCY)
Age: 55
Discharge: HOME | End: 2020-02-13
Payer: COMMERCIAL

## 2020-02-13 VITALS
HEIGHT: 63 IN | BODY MASS INDEX: 34.2 KG/M2 | OXYGEN SATURATION: 96 % | WEIGHT: 193 LBS | SYSTOLIC BLOOD PRESSURE: 167 MMHG | RESPIRATION RATE: 20 BRPM | HEART RATE: 62 BPM | DIASTOLIC BLOOD PRESSURE: 87 MMHG | TEMPERATURE: 97.6 F

## 2020-02-13 LAB
FLU A ANTIGEN: NEGATIVE
INFLUENZA B AG, EIA: NEGATIVE

## 2020-02-13 PROCEDURE — 99213 OFFICE O/P EST LOW 20 MIN: CPT | Performed by: NURSE PRACTITIONER

## 2020-02-13 PROCEDURE — 99214 OFFICE O/P EST MOD 30 MIN: CPT

## 2020-02-13 PROCEDURE — 87804 INFLUENZA ASSAY W/OPTIC: CPT

## 2020-02-13 RX ORDER — AMOXICILLIN AND CLAVULANATE POTASSIUM 875; 125 MG/1; MG/1
1 TABLET, FILM COATED ORAL 2 TIMES DAILY WITH MEALS
Qty: 20 TABLET | Refills: 0 | Status: SHIPPED | OUTPATIENT
Start: 2020-02-13 | End: 2020-02-23

## 2020-02-13 ASSESSMENT — ENCOUNTER SYMPTOMS
FLU SYMPTOMS: 1
SINUS PAIN: 1
VOMITING: 0
SHORTNESS OF BREATH: 0
CHEST TIGHTNESS: 0
DIARRHEA: 0
ABDOMINAL PAIN: 0
SORE THROAT: 0
SINUS PRESSURE: 1
COUGH: 1
NAUSEA: 0

## 2020-02-13 ASSESSMENT — PAIN DESCRIPTION - LOCATION: LOCATION: HEAD

## 2020-02-13 ASSESSMENT — PAIN - FUNCTIONAL ASSESSMENT: PAIN_FUNCTIONAL_ASSESSMENT: PREVENTS OR INTERFERES SOME ACTIVE ACTIVITIES AND ADLS

## 2020-02-13 ASSESSMENT — PAIN SCALES - GENERAL: PAINLEVEL_OUTOF10: 8

## 2020-02-13 NOTE — ED PROVIDER NOTES
[address]       Chief Complaint   Patient presents with    Sinusitis     cough congestion headache    Letter for School/Work       Nurses Notes reviewed and I agree except as noted in the HPI. HISTORY OF PRESENT ILLNESS   David Nichols is a 62 y.o. female who presents for evaluation. The history is provided by the patient. Influenza   Presenting symptoms: cough, fatigue, fever and myalgias    Presenting symptoms: no diarrhea, no headaches, no nausea, no shortness of breath, no sore throat and no vomiting    Duration:  1 day  Relieved by:  Nothing  Ineffective treatments:  OTC medications (Tylenol)  Associated symptoms: chills and nasal congestion    Associated symptoms: no ear pain    Risk factors: diabetes (prediabetes) and sick contacts ( and grandchildren)         REVIEW OF SYSTEMS     Review of Systems   Constitutional: Positive for chills, fatigue and fever. HENT: Positive for congestion, postnasal drip, sinus pressure and sinus pain. Negative for ear pain and sore throat. Respiratory: Positive for cough. Negative for chest tightness and shortness of breath. Cardiovascular: Negative for chest pain. Gastrointestinal: Negative for abdominal pain, diarrhea, nausea and vomiting. Musculoskeletal: Positive for myalgias. Skin: Negative for rash. Allergic/Immunologic: Negative for environmental allergies and food allergies. Neurological: Negative for headaches. PAST MEDICAL HISTORY         Diagnosis Date    Depression     KATI (generalized anxiety disorder)     GERD (gastroesophageal reflux disease)     Hypertension     Kidney stone     Prediabetes 10/18/2019       SURGICAL HISTORY     Patient  has a past surgical history that includes Cholecystectomy; Kidney stone surgery;  Colonoscopy; Kidney stone surgery (5/13/15); knee surgery (Right, 2008); cysto/uretero/pyeloscopy, calculus tx (N/A, Rhythm: Normal rate. Heart sounds: Normal heart sounds. Pulmonary:      Effort: Pulmonary effort is normal.      Breath sounds: Normal breath sounds. Lymphadenopathy:      Cervical: Cervical adenopathy present. Right cervical: Superficial cervical adenopathy present. Left cervical: Superficial cervical adenopathy present. Skin:     General: Skin is warm and dry. Findings: No rash (on exposed surfaces). Neurological:      Mental Status: She is alert and oriented to person, place, and time. Psychiatric:         Speech: Speech normal.         Behavior: Behavior is cooperative. DIAGNOSTIC RESULTS   Labs:  Results for orders placed or performed during the hospital encounter of 02/13/20   Rapid influenza A/B antigens   Result Value Ref Range    Flu A Antigen NEGATIVE NEGATIVE    Influenza B Ag, EIA NEGATIVE NEGATIVE       IMAGING:  No orders to display     URGENT CARE COURSE:     Vitals:    02/13/20 1327   BP: (!) 167/87   Pulse: 62   Resp: 20   Temp: 97.6 °F (36.4 °C)   SpO2: 96%   Weight: 193 lb (87.5 kg)   Height: 5' 3\" (1.6 m)       Medications - No data to display  PROCEDURES:  FINALIMPRESSION      1. Common cold        DISPOSITION/PLAN   DISPOSITION Decision To Discharge 02/13/2020 02:11:37 PM  Symptoms are 1 day in duration are likely viral in nature at this time. Patient states concern her symptoms may worsen and she will miss work. Prescription for antibiotic provided by patient has been advised not to fill for the next 3 to 4 days. Physical assessment findings, diagnostic testing(s) if applicable, and vital signs reviewed with patient/patient representative. Questions answered. If applicable, patient/patient representative will be contacted upon receipt of final culture and sensitivity or other testing results when available. Any additions or changes to medications or changes the plan of care will be made at that time.   Medications as directed, including OTC medications for supportive care. Education provided on medications. Differential diagnosis(s) discussed with patient/patient representative. Home care/self care instructions reviewed with patient/patient representative. Patient is to follow-up with family care provider in 2-3 days if no improvement. Patient is to go to the emergency department if symptoms worsen. Patient/patient representative is aware of care plan, questions answered, verbalizes understanding and is in agreement. Teach back method used for patient/patient representative teaching(s) and printed instructions attached to after visit summary.     ED Course as of Feb 13 1415   Thu Feb 13, 2020   1402 Influenza B Ag, EIA: NEGATIVE [HA]   1402 Flu A Antigen: NEGATIVE [HA]      ED Course User Index  [PEREIRA] OPAL Sellers - JUSTUS       Problem List Items Addressed This Visit     None      Visit Diagnoses     Common cold    -  Primary    Relevant Medications    amoxicillin-clavulanate (AUGMENTIN) 875-125 MG per tablet          PATIENT REFERRED TO:  OPAL Martinez - CNP  [facility]   [address] [place] [address]  [phone]    Schedule an appointment as soon as possible for a visit in 3 days  for further evalation, If symptoms worsen, GO DIRECTLY TO [id number] [facility]  [phone]  [facility] [id number], APRN - CNP  02/13/20 [id number]

## 2020-03-03 ENCOUNTER — NURSE ONLY (OUTPATIENT)
Dept: LAB | Age: 55
End: 2020-03-03

## 2020-03-03 ENCOUNTER — OFFICE VISIT (OUTPATIENT)
Dept: FAMILY MEDICINE CLINIC | Age: 55
End: 2020-03-03
Payer: COMMERCIAL

## 2020-03-03 VITALS
SYSTOLIC BLOOD PRESSURE: 101 MMHG | DIASTOLIC BLOOD PRESSURE: 80 MMHG | RESPIRATION RATE: 12 BRPM | BODY MASS INDEX: 33.13 KG/M2 | TEMPERATURE: 99.1 F | HEIGHT: 63 IN | WEIGHT: 187 LBS | OXYGEN SATURATION: 98 % | HEART RATE: 60 BPM

## 2020-03-03 LAB
ALBUMIN SERPL-MCNC: 4.3 G/DL (ref 3.5–5.1)
ALP BLD-CCNC: 83 U/L (ref 38–126)
ALT SERPL-CCNC: 23 U/L (ref 11–66)
ANION GAP SERPL CALCULATED.3IONS-SCNC: 13 MEQ/L (ref 8–16)
AST SERPL-CCNC: 23 U/L (ref 5–40)
BASOPHILS # BLD: 1 %
BASOPHILS ABSOLUTE: 0.1 THOU/MM3 (ref 0–0.1)
BILIRUB SERPL-MCNC: 0.4 MG/DL (ref 0.3–1.2)
BUN BLDV-MCNC: 14 MG/DL (ref 7–22)
CALCIUM SERPL-MCNC: 9.4 MG/DL (ref 8.5–10.5)
CHLORIDE BLD-SCNC: 103 MEQ/L (ref 98–111)
CO2: 26 MEQ/L (ref 23–33)
CREAT SERPL-MCNC: 0.9 MG/DL (ref 0.4–1.2)
EOSINOPHIL # BLD: 3.4 %
EOSINOPHILS ABSOLUTE: 0.3 THOU/MM3 (ref 0–0.4)
ERYTHROCYTE [DISTWIDTH] IN BLOOD BY AUTOMATED COUNT: 11 % (ref 11.5–14.5)
ERYTHROCYTE [DISTWIDTH] IN BLOOD BY AUTOMATED COUNT: 37.7 FL (ref 35–45)
GFR SERPL CREATININE-BSD FRML MDRD: 65 ML/MIN/1.73M2
GLUCOSE BLD-MCNC: 116 MG/DL (ref 70–108)
HCT VFR BLD CALC: 45 % (ref 37–47)
HEMOGLOBIN: 15.4 GM/DL (ref 12–16)
IMMATURE GRANS (ABS): 0.03 THOU/MM3 (ref 0–0.07)
IMMATURE GRANULOCYTES: 0.4 %
LYMPHOCYTES # BLD: 30 %
LYMPHOCYTES ABSOLUTE: 2.3 THOU/MM3 (ref 1–4.8)
MCH RBC QN AUTO: 32.1 PG (ref 26–33)
MCHC RBC AUTO-ENTMCNC: 34.2 GM/DL (ref 32.2–35.5)
MCV RBC AUTO: 93.8 FL (ref 81–99)
MONOCYTES # BLD: 7 %
MONOCYTES ABSOLUTE: 0.5 THOU/MM3 (ref 0.4–1.3)
NUCLEATED RED BLOOD CELLS: 0 /100 WBC
PLATELET # BLD: 220 THOU/MM3 (ref 130–400)
PMV BLD AUTO: 11.1 FL (ref 9.4–12.4)
POTASSIUM SERPL-SCNC: 3.7 MEQ/L (ref 3.5–5.2)
RBC # BLD: 4.8 MILL/MM3 (ref 4.2–5.4)
SEG NEUTROPHILS: 58.2 %
SEGMENTED NEUTROPHILS ABSOLUTE COUNT: 4.5 THOU/MM3 (ref 1.8–7.7)
SODIUM BLD-SCNC: 142 MEQ/L (ref 135–145)
TOTAL PROTEIN: 7.3 G/DL (ref 6.1–8)
WBC # BLD: 7.7 THOU/MM3 (ref 4.8–10.8)

## 2020-03-03 PROCEDURE — 3017F COLORECTAL CA SCREEN DOC REV: CPT | Performed by: NURSE PRACTITIONER

## 2020-03-03 PROCEDURE — G8427 DOCREV CUR MEDS BY ELIG CLIN: HCPCS | Performed by: NURSE PRACTITIONER

## 2020-03-03 PROCEDURE — G8417 CALC BMI ABV UP PARAM F/U: HCPCS | Performed by: NURSE PRACTITIONER

## 2020-03-03 PROCEDURE — G8484 FLU IMMUNIZE NO ADMIN: HCPCS | Performed by: NURSE PRACTITIONER

## 2020-03-03 PROCEDURE — 1036F TOBACCO NON-USER: CPT | Performed by: NURSE PRACTITIONER

## 2020-03-03 PROCEDURE — 99213 OFFICE O/P EST LOW 20 MIN: CPT | Performed by: NURSE PRACTITIONER

## 2020-03-03 NOTE — PROGRESS NOTES
strain: Not on file    Food insecurity:     Worry: Not on file     Inability: Not on file    Transportation needs:     Medical: Not on file     Non-medical: Not on file   Tobacco Use    Smoking status: Former Smoker     Packs/day: 0.50     Years: 20.00     Pack years: 10.00     Last attempt to quit: 1993     Years since quittin.5    Smokeless tobacco: Never Used   Substance and Sexual Activity    Alcohol use: Not Currently     Comment: socially    Drug use: No    Sexual activity: Not on file   Lifestyle    Physical activity:     Days per week: Not on file     Minutes per session: Not on file    Stress: Not on file   Relationships    Social connections:     Talks on phone: Not on file     Gets together: Not on file     Attends Pent[employer]ostalism service: Not on file     Active member of club or organization: Not on file     Attends meetings of clubs or organizations: Not on file     Relationship status: Not on file    Intimate partner violence:     Fear of current or ex partner: Not on file     Emotionally abused: Not on file     Physically abused: Not on file     Forced sexual activity: Not on file   Other Topics Concern    Not on file   Social History Narrative    Not on file       Family History   Problem Relation Age of Onset    Diabetes Mother     High Blood Pressure Mother     Cancer Mother     Kidney Disease Mother     Mental Illness Father          I have reviewed the patient's past medical history, past surgical history, allergies, medications, social and family history and I have made updates where appropriate.       Review of Systems  Positive responses are highlighted in bold    Constitutional:  Fever, Chills, Night Sweats, Fatigue, Unexpected changes in weight  Eyes:  Eye discharge, Eye pain, Eye redness, Visual disturbances   HENT:  Ear pain, Tinnitus, Nosebleeds, Trouble swallowing, Hearing loss, Sore throat  Cardiovascular:  Chest Pain, Palpitations, Orthopnea, Paroxysmal Nocturnal lymphadenopathy  Pulmonary/Chest: clear to auscultation bilaterally- no wheezes, rales or rhonchi, normal air movement, no respiratory distress or retractions  Cardiovascular: S1 and S2 auscultated w/ RRR. No murmurs, rubs, clicks, or gallops, distal pulses intact. Abdomen: soft, non-tender, non-distended, bowl sounds physiologic,  no rebound or guarding, no masses or hernias noted. Liver and spleen without enlargement. Extremities: no cyanosis, clubbing or edema of the lower extremities  Musculoskeletal: no bony abnormality or joint effusion  Neuro:  Alert, 5/5 strength globally and symmetrically  Psych: Affect flat. Mood normal. Thought process is normal without evidence of depression or psychosis. Good insight and appropriate interaction. Cognition and memory appear to be intact. Skin: warm and dry, no rash or erythema  Lymph:  No cervical, auricular or supraclavicular lymph nodes palpated    ASSESSMENT & PLAN  Jerson Negrete was seen today for medication problem. Diagnoses and all orders for this visit:    Adverse effect of drug, initial encounter  -     Comprehensive Metabolic Panel; Future  -     CBC With Auto Differential; Future    Dizziness  -     Comprehensive Metabolic Panel; Future  -     CBC With Auto Differential; Future    Anorexia  -     Comprehensive Metabolic Panel; Future  -     CBC With Auto Differential; Future      - suspect symptoms likely due to Ditropan  - stop Ditropan  - check labs to assess for possible dehydration  - work slip  - call back if still no improvement after stopping Ditropan    DISPOSITION    Return if symptoms worsen or fail to improve. Christen released without restrictions. PATIENT COUNSELING    Counseling was provided today regarding the following topics: Healthy eating habits, Regular exercise, substance abuse and healthy sleep habits.     Christen received counseling on the following healthy behaviors: medication adherence    Patient given educational materials on: See Attached    I have instructed Christen to complete a self tracking handout on none and instructed them to bring it with them to her next appointment. Barriers to learning and self management: none    Discussed use, benefit, and side effects of prescribed medications. Barriers to medication compliance addressed. All patient questions answered. Pt voiced understanding.        Electronically signed by OPAL Andrews CNP on 3/3/2020 at 11:44 AM

## 2020-03-04 ENCOUNTER — TELEPHONE (OUTPATIENT)
Dept: FAMILY MEDICINE CLINIC | Age: 55
End: 2020-03-04

## 2020-03-04 NOTE — TELEPHONE ENCOUNTER
I am unable to release any information about her condition to her employer unless Gaye Rebolledo gives authorization to me to release the information.

## 2020-03-04 NOTE — TELEPHONE ENCOUNTER
----- Message from OPAL Carolina - CNP sent at 3/4/2020  7:34 AM EST -----  Let Omar Baker know her labs are all within normal range.  Let me know if her symptoms don't improve after stopping the Ditropan

## 2020-10-20 ENCOUNTER — HOSPITAL ENCOUNTER (EMERGENCY)
Age: 55
Discharge: HOME | End: 2020-10-20
Payer: COMMERCIAL

## 2020-10-20 VITALS
HEIGHT: 63 IN | HEART RATE: 58 BPM | OXYGEN SATURATION: 96 % | WEIGHT: 201 LBS | TEMPERATURE: 97.2 F | SYSTOLIC BLOOD PRESSURE: 148 MMHG | DIASTOLIC BLOOD PRESSURE: 82 MMHG | BODY MASS INDEX: 35.61 KG/M2 | RESPIRATION RATE: 18 BRPM

## 2020-10-20 LAB
BILIRUBIN URINE: NEGATIVE
BLOOD, URINE: ABNORMAL
CHARACTER, URINE: CLEAR
COLOR: YELLOW
GLUCOSE URINE: NEGATIVE MG/DL
KETONES, URINE: NEGATIVE
LEUKOCYTE ESTERASE, URINE: ABNORMAL
NITRITE, URINE: NEGATIVE
PH, URINE: 6.5 (ref 5–9)
PROTEIN, URINE: NEGATIVE MG/DL
SPECIFIC GRAVITY, URINE: 1.01 (ref 1–1.03)
UROBILINOGEN, URINE: 0.2 EU/DL (ref 0.2–1)

## 2020-10-20 PROCEDURE — 87077 CULTURE AEROBIC IDENTIFY: CPT

## 2020-10-20 PROCEDURE — 87186 SC STD MICRODIL/AGAR DIL: CPT

## 2020-10-20 PROCEDURE — 81003 URINALYSIS AUTO W/O SCOPE: CPT

## 2020-10-20 PROCEDURE — 96372 THER/PROPH/DIAG INJ SC/IM: CPT

## 2020-10-20 PROCEDURE — 99213 OFFICE O/P EST LOW 20 MIN: CPT

## 2020-10-20 PROCEDURE — 87086 URINE CULTURE/COLONY COUNT: CPT

## 2020-10-20 PROCEDURE — 99213 OFFICE O/P EST LOW 20 MIN: CPT | Performed by: NURSE PRACTITIONER

## 2020-10-20 PROCEDURE — 6360000002 HC RX W HCPCS: Performed by: NURSE PRACTITIONER

## 2020-10-20 RX ORDER — CEFDINIR 300 MG/1
300 CAPSULE ORAL 2 TIMES DAILY
Qty: 14 CAPSULE | Refills: 0 | Status: SHIPPED | OUTPATIENT
Start: 2020-10-20 | End: 2020-10-27

## 2020-10-20 RX ORDER — KETOROLAC TROMETHAMINE 30 MG/ML
60 INJECTION, SOLUTION INTRAMUSCULAR; INTRAVENOUS ONCE
Status: COMPLETED | OUTPATIENT
Start: 2020-10-20 | End: 2020-10-20

## 2020-10-20 RX ORDER — KETOROLAC TROMETHAMINE 10 MG/1
10 TABLET, FILM COATED ORAL EVERY 8 HOURS PRN
Qty: 15 TABLET | Refills: 0 | Status: SHIPPED | OUTPATIENT
Start: 2020-10-20 | End: 2021-09-28

## 2020-10-20 RX ADMIN — KETOROLAC TROMETHAMINE 60 MG: 30 INJECTION, SOLUTION INTRAMUSCULAR at 12:59

## 2020-10-20 ASSESSMENT — ENCOUNTER SYMPTOMS
COLOR CHANGE: 0
BACK PAIN: 0
NAUSEA: 0
ABDOMINAL PAIN: 0

## 2020-10-20 ASSESSMENT — PAIN SCALES - GENERAL
PAINLEVEL_OUTOF10: 8
PAINLEVEL_OUTOF10: 9
PAINLEVEL_OUTOF10: 9

## 2020-10-20 ASSESSMENT — PAIN - FUNCTIONAL ASSESSMENT: PAIN_FUNCTIONAL_ASSESSMENT: PREVENTS OR INTERFERES SOME ACTIVE ACTIVITIES AND ADLS

## 2020-10-20 ASSESSMENT — PAIN DESCRIPTION - PAIN TYPE: TYPE: ACUTE PAIN

## 2020-10-20 ASSESSMENT — PAIN DESCRIPTION - DESCRIPTORS: DESCRIPTORS: DISCOMFORT

## 2020-10-20 ASSESSMENT — PAIN DESCRIPTION - ORIENTATION: ORIENTATION: RIGHT

## 2020-10-20 ASSESSMENT — PAIN DESCRIPTION - LOCATION: LOCATION: FLANK

## 2020-10-20 NOTE — ED PROVIDER NOTES
[facility]gent [facility] Encounter       CHIEF COMPLAINT       Chief Complaint   Patient presents with    Dysuria    Flank Pain     right       Nurses Notes reviewed and I agree except as noted in the HPI. HISTORY OF PRESENT ILLNESS   Lindsey Ybarra is a 60 y.o. female who presents the urgent care center complaining of dysuria and pain to the right flank area the patient states that she has been having symptoms for the past 3 days with flank pain. The patient stated she has a long history of kidney stones and urinary tract infections. The patient denies any fever, chills, nausea or vomiting. Patient states that she has had a couple of surgeries for kidney stones in the past 2015 she had a right percutaneous nephrolithotomy. The patient also had a cystoscopy and right ureteroscopy with dilatation and stent insertion in 2017. The patient states that she has not been able to get comfortable and has been taking naproxen for pain. Patient denied any abdominal pain or any visible hematuria. The history is provided by the patient. No  was used. Dysuria    This is a new problem. The current episode started more than 2 days ago. The problem occurs every urination. The quality of the pain is described as aching. The pain is at a severity of 9/10. There has been no fever. Associated symptoms include urgency and flank pain. Pertinent negatives include no chills, no sweats, no nausea, no discharge, no frequency, no hematuria and no hesitancy. Her past medical history is significant for kidney stones, urological procedure and recurrent UTIs. Flank Pain   This is a new problem. The current episode started more than 2 days ago. The problem occurs constantly. The problem has been gradually worsening. Pertinent negatives include no abdominal pain and no headaches. Nothing aggravates the symptoms. Nothing relieves the symptoms. She has tried nothing for the symptoms. REVIEW OF SYSTEMS     Review of Systems   Constitutional: Negative for appetite change, chills, diaphoresis, fatigue and fever. Gastrointestinal: Negative for abdominal pain and nausea. Genitourinary: Positive for difficulty urinating, dysuria, flank pain and urgency. Negative for frequency, hematuria, hesitancy and pelvic pain. Musculoskeletal: Negative for back pain and myalgias. Skin: Negative for color change. Neurological: Negative for headaches. PAST MEDICAL HISTORY         Diagnosis Date    Depression     KATI (generalized anxiety disorder)     GERD (gastroesophageal reflux disease)     Hypertension     Kidney stone     Prediabetes 10/18/2019       SURGICALHISTORY     Patient  has a past surgical history that includes Cholecystectomy; Kidney stone surgery; Colonoscopy; Kidney stone surgery (5/13/15); knee surgery (Right, 2008); cysto/uretero/pyeloscopy, calculus tx (N/A, 8/4/2017); pr office/outpt visit,procedure only (Left, 4/16/2018); and Hysterectomy, total abdominal.    CURRENT MEDICATIONS       Previous Medications    ACETAMINOPHEN (TYLENOL) 325 MG TABLET    Take 650 mg by mouth every 6 hours as needed for Pain    LIFITEGRAST (XIIDRA) 5 % SOLN    Apply to eye    LOPERAMIDE (IMODIUM) 2 MG CAPSULE    Take 1 capsule by mouth 4 times daily as needed for Diarrhea    MELOXICAM (MOBIC) 15 MG TABLET    Take 1 tablet by mouth daily    OXYBUTYNIN (DITROPAN-XL) 10 MG EXTENDED RELEASE TABLET    Take 1 tablet by mouth daily       ALLERGIES     Patient is is allergic to cipro iv [ciprofloxacin]. Patients   There is no immunization history on file for this patient. FAMILY HISTORY     Patient's family history includes Cancer in her mother; Diabetes in her mother; High Blood Pressure in her mother; Kidney Disease in her mother; Mental Illness in her father. SOCIAL HISTORY     Patient  reports that she quit smoking about 27 years ago. She has a 10.00 pack-year smoking history.  She has never used smokeless tobacco. She reports current alcohol use. She reports that she does not use drugs. PHYSICAL EXAM     ED TRIAGE VITALS  BP: (!) 141/74, Temp: 97.2 °F (36.2 °C), Pulse: 56, Resp: 18, SpO2: 96 %,Estimated body mass index is 35.61 kg/m² as calculated from the following:    Height as of this encounter: 5' 3\" (1.6 m). Weight as of this encounter: 201 lb (91.2 kg). ,No LMP recorded. Patient has had a hysterectomy. Physical Exam  Vitals signs and nursing note reviewed. Constitutional:       General: She is not in acute distress. Appearance: Normal appearance. She is well-developed. She is not ill-appearing, toxic-appearing or diaphoretic. HENT:      Head: Normocephalic. Right Ear: External ear normal.      Left Ear: External ear normal.      Nose: Nose normal.   Neck:      Musculoskeletal: Full passive range of motion without pain, normal range of motion and neck supple. Cardiovascular:      Rate and Rhythm: Normal rate. Pulmonary:      Effort: Pulmonary effort is normal.   Abdominal:      General: Bowel sounds are normal.      Tenderness: There is no abdominal tenderness. There is right CVA tenderness. Skin:     General: Skin is warm and dry. Capillary Refill: Capillary refill takes less than 2 seconds. Neurological:      Mental Status: She is alert and oriented to person, place, and time. Psychiatric:         Behavior: Behavior is cooperative.          DIAGNOSTIC RESULTS     Labs:  Results for orders placed or performed during the hospital encounter of 10/20/20   Urinalysis   Result Value Ref Range    Glucose, Ur Negative NEGATIVE mg/dl    Bilirubin Urine Negative NEGATIVE    Ketones, Urine Negative NEGATIVE    Specific Gravity, Urine 1.010 1.002 - 1.030    Blood, Urine Small (A) NEGATIVE    pH, Urine 6.50 5.0 - 9.0    Protein, Urine Negative NEGATIVE mg/dl    Urobilinogen, Urine 0.20 0.2 - 1.0 eu/dl    Nitrite, Urine Negative NEGATIVE    Leukocyte Esterase, Urine Moderate (A) NEGATIVE    Color, UA Yellow STRAW-YELLOW    Character, Urine Clear CLEAR-SL CLOUD       IMAGING:    No orders to display         EKG:      URGENT CARE COURSE:     Vitals:    10/20/20 1235   BP: (!) 141/74   Pulse: 56   Resp: 18   Temp: 97.2 °F (36.2 °C)   TempSrc: Temporal   SpO2: 96%   Weight: 201 lb (91.2 kg)   Height: 5' 3\" (1.6 m)       Medications   ketorolac (TORADOL) injection 60 mg (60 mg Intramuscular Given 10/20/20 1259)            PROCEDURES:  None    FINAL IMPRESSION      1. Acute cystitis with hematuria    2. Right flank pain    3. History of renal colic          DISPOSITION/ PLAN      I Did talk with the patient and advised her of the proper plan of care treatment would be to go over to the emergency department for CT scan to make sure that stated patient does not have any hydronephrosis of the right kidney with her history of kidney stones. The patient stated that she did not want to go to the emergency room at present time. The patient was advised that if she has increased pain she needs to go to the emergency department for further management of care and possible CT scan if warranted by the emergency staff. Patient or Patient designated representative was advised to drink plenty of water or fluids and take medication as prescribed. The patient or Patient designated representative is advised to monitor for any changes in pain, development of high fever, chills, persistent vomiting, development of increasing back or flank pain or increase in hematuria the patient is advised to go to the emergency department for reevaluation and further follow-up if they wouldn't notice any of the above symptoms. The patient or Patient designated representative was also advised to follow up with family doctor or primary care provider after the antibiotic is completed for repeat urinalysis. The patient did verbalize understanding of discharge instructions and is agreeable to the treatment plan.   The

## 2020-10-20 NOTE — ED TRIAGE NOTES
Patient ambulated to room with complaint of urine frequency and urgency that started a week ago and right flank pain 3 days ago.  States she has history of kidney infection

## 2020-10-21 ENCOUNTER — TELEPHONE (OUTPATIENT)
Dept: FAMILY MEDICINE CLINIC | Age: 55
End: 2020-10-21

## 2020-10-21 LAB
ORGANISM: ABNORMAL
URINE CULTURE, ROUTINE: ABNORMAL

## 2020-10-21 NOTE — TELEPHONE ENCOUNTER
Pt was evaluated at [facility] yesterday for an acute cystitis with hematuria and right flank pain. Pt was advised to call her PCP to order a CT scan for pt's right flank pain. UC note is in pt's chart fro review.      Please advise

## 2020-10-22 ENCOUNTER — OFFICE VISIT (OUTPATIENT)
Dept: FAMILY MEDICINE CLINIC | Age: 55
End: 2020-10-22
Payer: COMMERCIAL

## 2020-10-22 ENCOUNTER — HOSPITAL ENCOUNTER (OUTPATIENT)
Age: 55
Discharge: HOME | End: 2020-10-22
Payer: COMMERCIAL

## 2020-10-22 ENCOUNTER — HOSPITAL ENCOUNTER (OUTPATIENT)
Dept: ULTRASOUND IMAGING | Age: 55
Discharge: HOME | End: 2020-10-22
Payer: COMMERCIAL

## 2020-10-22 ENCOUNTER — TELEPHONE (OUTPATIENT)
Dept: FAMILY MEDICINE CLINIC | Age: 55
End: 2020-10-22

## 2020-10-22 VITALS
TEMPERATURE: 98.9 F | HEIGHT: 64 IN | DIASTOLIC BLOOD PRESSURE: 82 MMHG | HEART RATE: 66 BPM | OXYGEN SATURATION: 95 % | BODY MASS INDEX: 34.49 KG/M2 | RESPIRATION RATE: 10 BRPM | WEIGHT: 202 LBS | SYSTOLIC BLOOD PRESSURE: 120 MMHG

## 2020-10-22 DIAGNOSIS — R31.9 HEMATURIA, UNSPECIFIED TYPE: ICD-10-CM

## 2020-10-22 DIAGNOSIS — N20.0 RENAL CALCULUS: ICD-10-CM

## 2020-10-22 DIAGNOSIS — R10.9 RIGHT FLANK PAIN: ICD-10-CM

## 2020-10-22 DIAGNOSIS — N30.00 ACUTE CYSTITIS WITH POSITIVE CULTURE: ICD-10-CM

## 2020-10-22 LAB
ANION GAP SERPL CALCULATED.3IONS-SCNC: 13 MEQ/L (ref 8–16)
BUN BLDV-MCNC: 13 MG/DL (ref 7–22)
CALCIUM SERPL-MCNC: 9.3 MG/DL (ref 8.5–10.5)
CHLORIDE BLD-SCNC: 103 MEQ/L (ref 98–111)
CO2: 24 MEQ/L (ref 23–33)
CREAT SERPL-MCNC: 0.8 MG/DL (ref 0.4–1.2)
GFR SERPL CREATININE-BSD FRML MDRD: 74 ML/MIN/1.73M2
GLUCOSE BLD-MCNC: 111 MG/DL (ref 70–108)
POTASSIUM SERPL-SCNC: 3.7 MEQ/L (ref 3.5–5.2)
SODIUM BLD-SCNC: 140 MEQ/L (ref 135–145)

## 2020-10-22 PROCEDURE — 1036F TOBACCO NON-USER: CPT | Performed by: NURSE PRACTITIONER

## 2020-10-22 PROCEDURE — G8484 FLU IMMUNIZE NO ADMIN: HCPCS | Performed by: NURSE PRACTITIONER

## 2020-10-22 PROCEDURE — G8417 CALC BMI ABV UP PARAM F/U: HCPCS | Performed by: NURSE PRACTITIONER

## 2020-10-22 PROCEDURE — 80048 BASIC METABOLIC PNL TOTAL CA: CPT

## 2020-10-22 PROCEDURE — G8427 DOCREV CUR MEDS BY ELIG CLIN: HCPCS | Performed by: NURSE PRACTITIONER

## 2020-10-22 PROCEDURE — 76770 US EXAM ABDO BACK WALL COMP: CPT

## 2020-10-22 PROCEDURE — 99214 OFFICE O/P EST MOD 30 MIN: CPT | Performed by: NURSE PRACTITIONER

## 2020-10-22 PROCEDURE — 3017F COLORECTAL CA SCREEN DOC REV: CPT | Performed by: NURSE PRACTITIONER

## 2020-10-22 PROCEDURE — 36415 COLL VENOUS BLD VENIPUNCTURE: CPT

## 2020-10-22 RX ORDER — HYDROCODONE BITARTRATE AND ACETAMINOPHEN 5; 325 MG/1; MG/1
1 TABLET ORAL EVERY 6 HOURS PRN
Qty: 12 TABLET | Refills: 0 | Status: SHIPPED | OUTPATIENT
Start: 2020-10-22 | End: 2020-10-25

## 2020-10-22 NOTE — PROGRESS NOTES
Chief Complaint   Patient presents with    Follow-up     10.20 2020  F/U POSSIBLE KIDNEY INFECTION       History obtained from chart review and the patient. SUBJECTIVE:  Walker Perry is a 60 y.o. female that presents today for UTI    Urinary Symptoms    HPI:    Patient seen in the  10/20 for UTI. She was placed on Omnicef. Culture did grow out Klebsiella pneumoniae. Symptoms have been present for 4 day(s). Difficulty initiating a stream? No  Increased urinary frequency? Yes  Weak urinary stream?  No  Dysuria? no  Nocturia? Yes  Hematuria? Yes  Perineal discomfort? No    She has had multiple kidney stones in the past.      Age/Gender Health Maintenance    Lipid -   Lab Results   Component Value Date    CHOL 213 (H) 09/10/2016    CHOL 180 03/14/2015     Lab Results   Component Value Date    TRIG 173 09/10/2016    TRIG 74 03/14/2015     Lab Results   Component Value Date    HDL 44 09/10/2016    HDL 49 03/14/2015     Lab Results   Component Value Date    LDLCALC 134 09/10/2016    LDLCALC 116 03/14/2015     DM Screen -   Lab Results   Component Value Date    LABA1C 5.7 10/17/2019     Colon Cancer Screening - 2015  Lung Cancer Screening (Age 54 to [de-identified] with 30 pack year hx, current smoker or quit within past 15 years) - n/a    Tetanus - needs  Influenza Vaccine - next due 8/16  Pneumonia Vaccine - 63  Zostavax - 48  HPV Vaccine - n/a    Breast Cancer Screening - 7/15  Cervical Cancer Screening - n/a, had hysterectomy  Osteoporosis Screening - 70  Chlamydia Screen - n/a    AAA Screening - n/a    Falls screening - n/a    Current Outpatient Medications   Medication Sig Dispense Refill    HYDROcodone-acetaminophen (NORCO) 5-325 MG per tablet Take 1 tablet by mouth every 6 hours as needed for Pain for up to 3 days. Intended supply: 3 days.  Take lowest dose possible to manage pain 12 tablet 0    ketorolac (TORADOL) 10 MG tablet Take 1 tablet by mouth every 8 hours as needed for Pain 15 tablet 0    cefdinir (OMNICEF) 300 MG capsule Take 1 capsule by mouth 2 times daily for 7 days 14 capsule 0    loperamide (IMODIUM) 2 MG capsule Take 1 capsule by mouth 4 times daily as needed for Diarrhea 90 capsule 1    meloxicam (MOBIC) 15 MG tablet Take 1 tablet by mouth daily 90 tablet 3    oxybutynin (DITROPAN-XL) 10 MG extended release tablet Take 1 tablet by mouth daily 90 tablet 3    Lifitegrast (XIIDRA) 5 % SOLN Apply to eye      acetaminophen (TYLENOL) 325 MG tablet Take 650 mg by mouth every 6 hours as needed for Pain       No current facility-administered medications for this visit. Orders Placed This Encounter   Medications    HYDROcodone-acetaminophen (NORCO) 5-325 MG per tablet     Sig: Take 1 tablet by mouth every 6 hours as needed for Pain for up to 3 days. Intended supply: 3 days. Take lowest dose possible to manage pain     Dispense:  12 tablet     Refill:  0     Reduce doses taken as pain becomes manageable         All medications reviewed and reconciled, including OTC and herbal medications. Updated list given to patient.        Patient Active Problem List   Diagnosis    ANKUR (stress urinary incontinence, female)    History of lithotripsy    Renal calculus    KATI (generalized anxiety disorder)    Moderate episode of recurrent major depressive disorder (HCC)    Recurrent UTI    Ureteral stricture    Prediabetes    Primary osteoarthritis of both knees       Past Medical History:   Diagnosis Date    Depression     KATI (generalized anxiety disorder)     GERD (gastroesophageal reflux disease)     Hypertension     Kidney stone     Prediabetes 10/18/2019       Past Surgical History:   Procedure Laterality Date    CHOLECYSTECTOMY      COLONOSCOPY      CYSTO/URETERO/PYELOSCOPY, CALCULUS TX N/A 8/4/2017    CYSTOSCOPY, RIGHT URETEROSCOPY, BALLOON DILATION, STENT INSERTION performed by Carmella Will MD at [address] [place] [address] STONE SURGERY  5/13/15    Right percutaneous nephrolithotomy     KNEE SURGERY Right     partial knee replacement    [place] OFFICE/OUTPT VISIT,PROCEDURE ONLY Left 2018    LEFT WRIST OPEN REDUCTION INTERNAL FIXATION performed by Isha Simmons MD at [address] [facility]   Allergen Reactions    Cipro Iv [Ciprofloxacin] Hives       Social History     Socioeconomic History    Marital status:      Spouse name: Not on file    Number of children: Not on file    Years of education: Not on file    Highest education level: Not on file   Occupational History    Not on file   Social Needs    Financial resource strain: Not on file    Food insecurity     Worry: Not on file     Inability: Not on file    Transportation needs     Medical: Not on file     Non-medical: Not on file   Tobacco Use    Smoking status: Former Smoker     Packs/day: 0.50     Years: 20.00     Pack years: 10.00     Last attempt to quit: 1993     Years since quittin.1    Smokeless tobacco: Never Used   Substance and Sexual Activity    Alcohol use: Yes     Comment: socially    Drug use: No    Sexual activity: Not on file   Lifestyle    Physical activity     Days per week: Not on file     Minutes per session: Not on file    Stress: Not on file   Relationships    Social connections     Talks on phone: Not on file     Gets together: Not on file     Attends Sabianist service: Not on file     Active member of club or organization: Not on file     Attends meetings of clubs or organizations: Not on file     Relationship status: Not on file    Intimate partner violence     Fear of current or ex partner: Not on file     Emotionally abused: Not on file     Physically abused: Not on file     Forced sexual activity: Not on file   Other Topics Concern    Not on file   Social History Narrative    Not on file       Family History   Problem Relation Age of Onset    Diabetes Mother     High Blood Pressure Mother    Marimar Riser Mother  Kidney Disease Mother     Mental Illness Father          I have reviewed the patient's past medical history, past surgical history, allergies, medications, social and family history and I have made updates where appropriate.       Review of Systems  Positive responses are highlighted in bold    Constitutional:  Fever, Chills, Night Sweats, Fatigue, Unexpected changes in weight  Eyes:  Eye discharge, Eye pain, Eye redness, Visual disturbances   HENT:  Ear pain, Tinnitus, Nosebleeds, Trouble swallowing, Hearing loss, Sore throat  Cardiovascular:  Chest Pain, Palpitations, Orthopnea, Paroxysmal Nocturnal Dyspnea  Respiratory:  Cough, Wheezing, Shortness of breath, Chest tightness, Apnea  Gastrointestinal:  Nausea, Vomiting, Diarrhea, Constipation, Heartburn, Blood in stool  Genitourinary:  Difficulty or painful urination, Flank pain, Change in frequency, Urgency  Skin:  Color change, Rash, Itching, Wound  Psychiatric:  Hallucinations, Anxiety, Depression, Suicidal ideation  Hematological:  Enlarged glands, Easy bleeding, Easily bruising  Musculoskeletal:  Joint pain, Back pain, Gait problems, Joint swelling, Myalgias  Neurological:  Dizziness, Headaches, Presyncope, Numbness, Seizures, Tremors  Allergy:  Environmental allergies, Food allergies  Endocrine:  Heat Intolerance, Cold Intolerance, Polydipsia, Polyphagia, Polyuria      Lab Results   Component Value Date     03/03/2020    K 3.7 03/03/2020     03/03/2020    CO2 26 03/03/2020    BUN 14 03/03/2020    CREATININE 0.9 03/03/2020    GLUCOSE 116 (H) 03/03/2020    CALCIUM 9.4 03/03/2020    PROT 7.3 03/03/2020    LABALBU 4.3 03/03/2020    BILITOT 0.4 03/03/2020    ALKPHOS 83 03/03/2020    AST 23 03/03/2020    ALT 23 03/03/2020    LABGLOM 65 (A) 03/03/2020       Lab Results   Component Value Date    WBC 7.7 03/03/2020    HGB 15.4 03/03/2020    HCT 45.0 03/03/2020    MCV 93.8 03/03/2020     03/03/2020     Lab Results   Component Value Date    TSH Pain for up to 3 days. Intended supply: 3 days. Take lowest dose possible to manage pain    Right flank pain  -     Cancel: CT ABDOMEN PELVIS WO CONTRAST Additional Contrast? None; Future  -     Basic Metabolic Panel; Future  -     US RENAL COMPLETE; Future  -     HYDROcodone-acetaminophen (NORCO) 5-325 MG per tablet; Take 1 tablet by mouth every 6 hours as needed for Pain for up to 3 days. Intended supply: 3 days. Take lowest dose possible to manage pain    Hematuria, unspecified type  -     Cancel: CT ABDOMEN PELVIS WO CONTRAST Additional Contrast? None; Future  -     Basic Metabolic Panel; Future  -     US RENAL COMPLETE; Future  -     HYDROcodone-acetaminophen (NORCO) 5-325 MG per tablet; Take 1 tablet by mouth every 6 hours as needed for Pain for up to 3 days. Intended supply: 3 days. Take lowest dose possible to manage pain    Renal calculus  -     Cancel: CT ABDOMEN PELVIS WO CONTRAST Additional Contrast? None; Future  -     Basic Metabolic Panel; Future  -     US RENAL COMPLETE; Future  -     HYDROcodone-acetaminophen (NORCO) 5-325 MG per tablet; Take 1 tablet by mouth every 6 hours as needed for Pain for up to 3 days. Intended supply: 3 days. Take lowest dose possible to manage pain      - obtain stat renal US to rule out kidney stone vs possible pyelonephritis  - con't Omnicef  - obtain BMP today  - con't toradol, add Norco for pain    Controlled Substance Monitoring:    Acute and Chronic Pain Monitoring:   RX Monitoring 10/22/2020   Attestation -   Periodic Controlled Substance Monitoring No signs of potential drug abuse or diversion identified. DISPOSITION    Return if symptoms worsen or fail to improve. Christen released without restrictions. PATIENT COUNSELING    Counseling was provided today regarding the following topics: Healthy eating habits, Regular exercise, substance abuse and healthy sleep habits.     Christen received counseling on the following healthy behaviors: medication adherence    Patient given educational materials on: See Attached    I have instructed Christen to complete a self tracking handout on none and instructed them to bring it with them to her next appointment. Barriers to learning and self management: none    Discussed use, benefit, and side effects of prescribed medications. Barriers to medication compliance addressed. All patient questions answered. Pt voiced understanding.        Electronically signed by OPAL Morillo CNP on 10/22/2020 at 11:01 AM

## 2020-10-22 NOTE — TELEPHONE ENCOUNTER
Patient has been informed and voiced understanding   Patient is also an established patient with Urology and will call to get an appointment

## 2020-10-22 NOTE — TELEPHONE ENCOUNTER
----- Message from Isac [employer], APRN - CNP sent at 10/22/2020  1:35 PM EDT -----  Let Lloyd Rodriguez know her renal US does show several kidney stones in the right kidney. It is causing some mild obstruction of the kidney. I think we can actually manage this by getting her into Urology, rather than sending her to the ER. She does need to get her blood work done ASAP to check her kidney function. I am placing a referral to Urology, and can we call them and see if they could see her ASAP?

## 2020-10-23 ENCOUNTER — HOSPITAL ENCOUNTER (OUTPATIENT)
Dept: CT IMAGING | Age: 55
Discharge: HOME | End: 2020-10-23
Payer: COMMERCIAL

## 2020-10-23 ENCOUNTER — OFFICE VISIT (OUTPATIENT)
Dept: UROLOGY | Age: 55
End: 2020-10-23
Payer: COMMERCIAL

## 2020-10-23 VITALS — WEIGHT: 200.8 LBS | BODY MASS INDEX: 35.58 KG/M2 | TEMPERATURE: 97.3 F | HEIGHT: 63 IN

## 2020-10-23 LAB
BILIRUBIN URINE: NEGATIVE
BLOOD URINE, POC: NORMAL
CHARACTER, URINE: CLEAR
COLOR, URINE: YELLOW
GLUCOSE URINE: NEGATIVE MG/DL
KETONES, URINE: NEGATIVE
LEUKOCYTE CLUMPS, URINE: NEGATIVE
NITRITE, URINE: NEGATIVE
PH, URINE: 5.5 (ref 5–9)
PROTEIN, URINE: NEGATIVE MG/DL
SPECIFIC GRAVITY, URINE: >= 1.03 (ref 1–1.03)
UROBILINOGEN, URINE: 0.2 EU/DL (ref 0–1)

## 2020-10-23 PROCEDURE — 81003 URINALYSIS AUTO W/O SCOPE: CPT | Performed by: UROLOGY

## 2020-10-23 PROCEDURE — 99214 OFFICE O/P EST MOD 30 MIN: CPT | Performed by: UROLOGY

## 2020-10-23 PROCEDURE — G8484 FLU IMMUNIZE NO ADMIN: HCPCS | Performed by: UROLOGY

## 2020-10-23 PROCEDURE — 74176 CT ABD & PELVIS W/O CONTRAST: CPT

## 2020-10-23 PROCEDURE — G8427 DOCREV CUR MEDS BY ELIG CLIN: HCPCS | Performed by: UROLOGY

## 2020-10-23 PROCEDURE — 1036F TOBACCO NON-USER: CPT | Performed by: UROLOGY

## 2020-10-23 PROCEDURE — G8417 CALC BMI ABV UP PARAM F/U: HCPCS | Performed by: UROLOGY

## 2020-10-23 PROCEDURE — 3017F COLORECTAL CA SCREEN DOC REV: CPT | Performed by: UROLOGY

## 2020-10-23 RX ORDER — SILODOSIN 4 MG/1
4 CAPSULE ORAL EVERY EVENING
Qty: 30 CAPSULE | Refills: 0 | Status: SHIPPED | OUTPATIENT
Start: 2020-10-23 | End: 2021-12-13

## 2020-10-23 NOTE — PROGRESS NOTES
Dr. Johnny Valente MD  [facility] Consultation / New Patient Visit    Patient:  Radha Davidson  YOB: 1965  Date: 10/23/2020  Consult requested from OPAL Weston CNP     HISTORY OF PRESENT ILLNESS:   The patient is a 60 y.o. female who presents today for follow-up for the following problem(s): kidney stone  Overall the problem(s) : are worsening. Associated Symptoms: No dysuria, gross hematuria. Pain Severity:      Today visit:   10/23/20   HPI Stone  Ms Terance Felty presents with worsening right flank pain that started 1-2 weeks ago. She has history of stones. She states the pain radiates to her right groin. Pain is a 10/10 now. She has a renal ultrasound the demonstrates right hydronephrosis. Afebrile. She has a UTI (10/20), currently on Omnicef. Labs reviewed  Old records reviewed  Problem: worsening  Duration: 1-2 weeks  The patient does have history of kidney stones in the past.     history: stones   FH: none    Summary of old records:   (Patient's old records, notes and chart reviewed and summarized above.)  Radha Davidson is a 62 y.o. with past medical history of HTN, GERD who follows up today to review CT Urogram.     She was last seen in November in follow up from Urgent Care. Presented to Urgent Carewith c/o urinary frequency, dysuria, and pelvic pressure. Urine there showed moderate blood and leuks, negative for nitrites. She was discharged home with Bactrim. In follow up, she reported the frequency and urgency continued. Also having some urge incontinence. Had a couple of UTIs in the past month. Significant hx of kidney stones. No imaging since 2017. She's wasn't on any preventative medications for UTIs. Said she wasn't drinking much water.   She was told to increase her water intake, started on Pyridium and Toradol, and a CT Urogram was ordered.     CT Urogram revealed no obstructive uropathy, however, did show a slightly lobulated contour to the right kidney which could be from inflammation or infection. Christen reports her right flank pain persists. She is no longer taking the Toradol and Pyridium, she is out. Urine today was negative for infection.      Christen Parker comes in alone. History is obtained from patient and medical record. Urinalysis today:  No results found for this visit on 10/23/20.     Last BUN and creatinine:  Lab Results   Component Value Date    BUN 13 10/22/2020     Lab Results   Component Value Date    CREATININE 0.8 10/22/2020       Imaging Reviewed during this Office Visit:   (results were independently reviewed by physician and radiology report verified)    PAST MEDICAL, FAMILY AND SOCIAL HISTORY:  Past Medical History:   Diagnosis Date    Depression     KATI (generalized anxiety disorder)     GERD (gastroesophageal reflux disease)     Hypertension     Kidney stone     Prediabetes 10/18/2019     Past Surgical History:   Procedure Laterality Date    CHOLECYSTECTOMY      COLONOSCOPY      CYSTO/URETERO/PYELOSCOPY, CALCULUS TX N/A 8/4/2017    CYSTOSCOPY, RIGHT URETEROSCOPY, BALLOON DILATION, STENT INSERTION performed by Philip Bray MD at [address] [place] [address] SURGERY  5/13/15    Right percutaneous nephrolithotomy     KNEE SURGERY Right 2008    partial knee replacement    [place] OFFICE/OUTPT VISIT,PROCEDURE ONLY Left 4/16/2018    LEFT WRIST OPEN REDUCTION INTERNAL FIXATION performed by Alisa Cabezas MD at [address] [place] [address] History   Problem Relation Age of Onset    Diabetes Mother     High Blood Pressure Mother     Cancer Mother     Kidney Disease Mother     Mental Illness Father      Outpatient Medications Marked as Taking for the 10/23/20 encounter (Office Visit) with Kat Wells MD   Medication Sig Dispense Refill    HYDROcodone-acetaminophen (NORCO) 5-325 MG per tablet Take 1 tablet by mouth every 6 hours as needed for Pain for up to 3 days. Intended supply: 3 days. Take lowest dose possible to manage pain 12 tablet 0    cefdinir (OMNICEF) 300 MG capsule Take 1 capsule by mouth 2 times daily for 7 days 14 capsule 0    loperamide (IMODIUM) 2 MG capsule Take 1 capsule by mouth 4 times daily as needed for Diarrhea 90 capsule 1    meloxicam (MOBIC) 15 MG tablet Take 1 tablet by mouth daily 90 tablet 3       Cipro iv [ciprofloxacin]  Social History     Tobacco Use   Smoking Status Former Smoker    Packs/day: 0.50    Years: 20.00    Pack years: 10.00    Last attempt to quit: 1993    Years since quittin.1   Smokeless Tobacco Never Used       Social History     Substance and Sexual Activity   Alcohol Use Yes    Comment: socially       REVIEW OF SYSTEMS:  Constitutional: negative  Eyes: negative  Respiratory: negative  Cardiovascular: negative  Gastrointestinal: negative  Genitourinary: negative  Musculoskeletal: negative  Skin: negative   Neurological: negative  Hematological/Lymphatic: negative  Psychological: negative    Physical Exam:    This a 60 y.o. female      Vitals:    10/23/20 0809   Temp: 97.3 °F (36.3 °C)     Constitutional: Patient in no acute distress   Neuro: alert and oriented to person place and time. Psych: Mood and affect normal.  Head: atraumatic normocephalic  Eyes: EOMi  HEENT: neck supple, trachea midline  Lungs: Respiratory effort normal  Cardiovascular:  Normal peripheral pulses  Abdomen: Soft, non-tender, non-distended, No CVA  Bladder: non-tender and not distended. FROMx4, no cyanosis clubbing edema  Skin: warm and dry     Assessment and Plan      1. Flank pain    2. Renal calculus           Plan:      No follow-ups on file.   CT stone stat  Keep NPO  Please have come back to office after CT stone is done for possible treatment of obstructing stone and UTI

## 2020-10-26 ENCOUNTER — TELEPHONE (OUTPATIENT)
Dept: FAMILY MEDICINE CLINIC | Age: 55
End: 2020-10-26

## 2020-10-28 ENCOUNTER — HOSPITAL ENCOUNTER (EMERGENCY)
Age: 55
Discharge: HOME | End: 2020-10-28
Payer: COMMERCIAL

## 2020-10-28 ENCOUNTER — APPOINTMENT (OUTPATIENT)
Dept: GENERAL RADIOLOGY | Age: 55
End: 2020-10-28
Payer: COMMERCIAL

## 2020-10-28 VITALS
HEART RATE: 75 BPM | TEMPERATURE: 97.4 F | SYSTOLIC BLOOD PRESSURE: 165 MMHG | HEIGHT: 63 IN | RESPIRATION RATE: 16 BRPM | WEIGHT: 199 LBS | OXYGEN SATURATION: 97 % | DIASTOLIC BLOOD PRESSURE: 99 MMHG | BODY MASS INDEX: 35.26 KG/M2

## 2020-10-28 PROCEDURE — L3809 WHFO W/O JOINTS PRE OTS: HCPCS

## 2020-10-28 PROCEDURE — 99213 OFFICE O/P EST LOW 20 MIN: CPT | Performed by: NURSE PRACTITIONER

## 2020-10-28 PROCEDURE — 99214 OFFICE O/P EST MOD 30 MIN: CPT

## 2020-10-28 PROCEDURE — 73130 X-RAY EXAM OF HAND: CPT

## 2020-10-28 ASSESSMENT — PAIN DESCRIPTION - PAIN TYPE: TYPE: ACUTE PAIN

## 2020-10-28 ASSESSMENT — PAIN DESCRIPTION - DESCRIPTORS: DESCRIPTORS: SHARP

## 2020-10-28 ASSESSMENT — PAIN DESCRIPTION - FREQUENCY: FREQUENCY: CONTINUOUS

## 2020-10-28 ASSESSMENT — ENCOUNTER SYMPTOMS: SHORTNESS OF BREATH: 0

## 2020-10-28 ASSESSMENT — PAIN DESCRIPTION - ORIENTATION: ORIENTATION: RIGHT

## 2020-10-28 ASSESSMENT — PAIN DESCRIPTION - LOCATION: LOCATION: HAND;WRIST

## 2020-10-28 ASSESSMENT — PAIN SCALES - GENERAL: PAINLEVEL_OUTOF10: 10

## 2020-10-28 NOTE — ED TRIAGE NOTES
Pt ambulatory into [facility] with c/o right hand and wrist pain. Pt states today she fell outside and landed on concrete. Pt states she landed on her right side and caught herself with her hand. Pt states pain 10. Hand swelling noted and with bruising. Ice applied upon coming into building. Rings removed with soap. Redness noted to right index finger where ring was.

## 2020-10-28 NOTE — ED PROVIDER NOTES
[facility]  Ur[facility] Encounter       CHIEF COMPLAINT       Chief Complaint   Patient presents with    Hand Injury     right hand and wrist injury after fall today at 1600       Nurses Notes reviewed and I agree except as noted in the HPI. HISTORY OF PRESENT ILLNESS   Linnea Calhoun is a 60 y.o. female who presents plaints of right wrist and forearm pain after she fell today around 4:00. Arm Injury   Location:  Wrist and arm  Arm location:  R arm  Wrist location:  R wrist  Injury: yes    Time since incident:  2 hours  Mechanism of injury: fall    Fall:     Fall occurred:  Standing    Impact surface:  Dirt    Point of impact:  Hands  Pain details:     Quality:  Aching and sharp    Radiates to:  Does not radiate    Severity:  Moderate    Onset quality:  Sudden    Duration:  2 hours    Timing:  Constant    Progression:  Worsening  Handedness:  Right-handed  Prior injury to area:  No  Relieved by: Ice  Worsened by: Movement  Ineffective treatments: Ice  Associated symptoms: decreased range of motion, numbness, swelling and tingling    Associated symptoms: no fever and no muscle weakness    Risk factors: no frequent fractures        REVIEW OF SYSTEMS     Review of Systems   Constitutional: Negative for fever. Respiratory: Negative for shortness of breath. Cardiovascular: Negative for chest pain. Musculoskeletal: Positive for arthralgias, joint swelling (right wrist) and myalgias. Skin: Negative for rash. Neurological: Negative for weakness and headaches. PAST MEDICAL HISTORY         Diagnosis Date    Depression     KATI (generalized anxiety disorder)     GERD (gastroesophageal reflux disease)     Hypertension     Kidney stone     Prediabetes 10/18/2019       SURGICALHISTORY     Patient  has a past surgical history that includes Cholecystectomy; Kidney stone surgery; Colonoscopy; Kidney stone surgery (5/13/15); knee surgery (Right, 2008); cysto/uretero/pyeloscopy, calculus tx (N/A, 8/4/2017); pr office/outpt visit,procedure only (Left, 4/16/2018); and Hysterectomy, total abdominal.    CURRENT MEDICATIONS       Discharge Medication List as of 10/28/2020  6:11 PM      CONTINUE these medications which have NOT CHANGED    Details   loperamide (IMODIUM) 2 MG capsule Take 1 capsule by mouth 4 times daily as needed for Diarrhea, Disp-90 capsule, R-1Normal      meloxicam (MOBIC) 15 MG tablet Take 1 tablet by mouth daily, Disp-90 tablet, R-3Normal      silodosin (RAPAFLO) 4 MG CAPS capsule Take 1 capsule by mouth every evening, Disp-30 capsule,R-0Normal      ketorolac (TORADOL) 10 MG tablet Take 1 tablet by mouth every 8 hours as needed for Pain, Disp-15 tablet,R-0Normal      Lifitegrast (XIIDRA) 5 % SOLN Apply to eyeHistorical Med             ALLERGIES     Patient is is allergic to cipro iv [ciprofloxacin]. Patients   There is no immunization history on file for this patient. FAMILY HISTORY     Patient's family history includes Cancer in her mother; Diabetes in her mother; High Blood Pressure in her mother; Kidney Disease in her mother; Mental Illness in her father. SOCIAL HISTORY     Patient  reports that she quit smoking about 27 years ago. She has a 10.00 pack-year smoking history. She has never used smokeless tobacco. She reports current alcohol use. She reports that she does not use drugs. PHYSICAL EXAM     ED TRIAGE VITALS  BP: (!) 165/99, Temp: 97.4 °F (36.3 °C), Pulse: 75, Resp: 16, SpO2: 97 %,Estimated body mass index is 35.25 kg/m² as calculated from the following:    Height as of this encounter: 5' 3\" (1.6 m). Weight as of this encounter: 199 lb (90.3 kg). ,No LMP recorded. Patient has had a hysterectomy. Physical Exam  Vitals signs and nursing note reviewed. Constitutional:       General: She is not in acute distress. Appearance: Normal appearance. Cardiovascular:      Rate and Rhythm: Normal rate and regular rhythm. Heart sounds: Normal heart sounds. Pulmonary:      Effort: Pulmonary effort is normal.      Breath sounds: Normal breath sounds. Musculoskeletal:      Right wrist: She exhibits decreased range of motion, tenderness and swelling. Arms:    Skin:     General: Skin is warm and dry. Neurological:      Mental Status: She is alert and oriented to person, place, and time. Psychiatric:         Behavior: Behavior normal.         DIAGNOSTIC RESULTS     Labs:No results found for this visit on 10/28/20. IMAGING:    XR HAND RIGHT (MIN 3 VIEWS)   Final Result      No fracture. **This report has been created using voice recognition software. It may contain minor errors which are inherent in voice recognition technology. **      Final report electronically signed by Dr. Blake Rodriguez on 10/28/2020 5:54 PM          I have independently reviewed the radiology images as well as the radiologist's report and have discussed them with the patient. EKG:  None    URGENT CARE COURSE:     Vitals:    10/28/20 1730   BP: (!) 165/99   Pulse: 75   Resp: 16   Temp: 97.4 °F (36.3 °C)   TempSrc: Temporal   SpO2: 97%   Weight: 199 lb (90.3 kg)   Height: 5' 3\" (1.6 m)       Medications - No data to display         PROCEDURES:  None    FINAL IMPRESSION      1. Forearm sprain, right, initial encounter    2. Fall, initial encounter      DISPOSITION/ PLAN   DISPOSITION Decision To Discharge 10/28/2020 06:09:28 PM     Clinical exam consistent with contusion and sprain of the right forearm/wrist.  No evidence of acute fracture or dislocation. Re[facility] in which she voiced she is already planning to be out there for carpal tunnel on Friday. She may take over-the-counter ibuprofen 6 to 800 mg 3 times daily as needed for pain and swelling. She should ice the area 3 times daily for 20 minutes. Right Velcro wrist brace applied to patient for support.   Patient was instructed on rest, ice, compression, and elevation. She voiced understanding and was agreeable with above-mentioned plan. Patient was discharged in stable condition.     PATIENT REFERRED TO:  OPAL Reid CNP  [address] / [place] [id number]      DISCHARGE MEDICATIONS:  Discharge Medication List as of 10/28/2020  6:11 PM          Discharge Medication List as of 10/28/2020  6:11 PM          Discharge Medication List as of 10/28/2020  6:11 PM          OPAL Hardy CNP    (Please note that portions of this note were completed with a voice recognition program. Efforts were made to edit the dictations but occasionally words are mis-transcribed.)            OPAL Hardy CNP  10/28/20 7020

## 2020-10-28 NOTE — ED NOTES
Pt verbalized discharge instructions. Pt informed to go to ER if develop chest pain, shortness of breath or abdominal pain. Pt ambulatory out in stable condition. Assessment unchanged.        Travis Hyde, RN  10/28/20 1358

## 2020-12-22 ENCOUNTER — TELEPHONE (OUTPATIENT)
Dept: FAMILY MEDICINE CLINIC | Age: 55
End: 2020-12-22

## 2020-12-22 NOTE — TELEPHONE ENCOUNTER
scheduling [facility] 01/05/20 @ [facility] @ 11:00am   Arrive @ 10:45 to  [address]  [facility]     No powder ,lotions,creams or deodorant until after test

## 2020-12-22 NOTE — TELEPHONE ENCOUNTER
I was reviewing charts and saw that Jordi Nesbitt is due for her mammogram. Danya Multani if I go ahead and order it and we get it scheduled? Thanks!

## 2020-12-22 NOTE — TELEPHONE ENCOUNTER
Pt states yes you can go ahead and order the mammogram  She will go to [facility]

## 2021-01-05 ENCOUNTER — TELEPHONE (OUTPATIENT)
Dept: FAMILY MEDICINE CLINIC | Age: 56
End: 2021-01-05

## 2021-01-05 ENCOUNTER — HOSPITAL ENCOUNTER (OUTPATIENT)
Dept: WOMENS IMAGING | Age: 56
Discharge: HOME | End: 2021-01-05
Payer: COMMERCIAL

## 2021-01-05 DIAGNOSIS — Z12.31 ENCOUNTER FOR SCREENING MAMMOGRAM FOR MALIGNANT NEOPLASM OF BREAST: ICD-10-CM

## 2021-01-05 PROCEDURE — 77063 BREAST TOMOSYNTHESIS BI: CPT

## 2021-01-05 NOTE — TELEPHONE ENCOUNTER
----- Message from OPAL Roger CNP sent at 1/5/2021  4:12 PM EST -----  Let Lindy  know her mammogram was abnormal. There was a nodular density in the left breast which the radiologist is recommending doing an ultrasound of this. [facility] and set this up. Any questions let me know!

## 2021-01-08 ENCOUNTER — HOSPITAL ENCOUNTER (OUTPATIENT)
Dept: WOMENS IMAGING | Age: 56
End: 2021-01-08
Payer: COMMERCIAL

## 2021-01-08 ENCOUNTER — HOSPITAL ENCOUNTER (OUTPATIENT)
Dept: WOMENS IMAGING | Age: 56
Discharge: HOME | End: 2021-01-08
Payer: COMMERCIAL

## 2021-01-08 DIAGNOSIS — R92.2 BREAST DENSITY: ICD-10-CM

## 2021-01-08 PROCEDURE — G0279 TOMOSYNTHESIS, MAMMO: HCPCS

## 2021-09-28 ENCOUNTER — OFFICE VISIT (OUTPATIENT)
Dept: FAMILY MEDICINE CLINIC | Age: 56
End: 2021-09-28
Payer: COMMERCIAL

## 2021-09-28 ENCOUNTER — HOSPITAL ENCOUNTER (OUTPATIENT)
Age: 56
Discharge: HOME | End: 2021-09-28
Payer: COMMERCIAL

## 2021-09-28 VITALS
RESPIRATION RATE: 16 BRPM | HEIGHT: 63 IN | HEART RATE: 65 BPM | WEIGHT: 196.8 LBS | BODY MASS INDEX: 34.87 KG/M2 | TEMPERATURE: 98.8 F | SYSTOLIC BLOOD PRESSURE: 150 MMHG | DIASTOLIC BLOOD PRESSURE: 98 MMHG

## 2021-09-28 DIAGNOSIS — Z00.00 WELL ADULT HEALTH CHECK: ICD-10-CM

## 2021-09-28 DIAGNOSIS — R73.03 PREDIABETES: ICD-10-CM

## 2021-09-28 DIAGNOSIS — N39.41 URGE INCONTINENCE: ICD-10-CM

## 2021-09-28 DIAGNOSIS — K58.0 IRRITABLE BOWEL SYNDROME WITH DIARRHEA: ICD-10-CM

## 2021-09-28 DIAGNOSIS — R20.2 PARESTHESIA OF RIGHT ARM: ICD-10-CM

## 2021-09-28 DIAGNOSIS — R73.03 PREDIABETES: Primary | ICD-10-CM

## 2021-09-28 LAB
ALBUMIN SERPL-MCNC: 4.3 G/DL (ref 3.5–5.1)
ALP BLD-CCNC: 100 U/L (ref 38–126)
ALT SERPL-CCNC: 22 U/L (ref 11–66)
ANION GAP SERPL CALCULATED.3IONS-SCNC: 11 MEQ/L (ref 8–16)
AST SERPL-CCNC: 22 U/L (ref 5–40)
AVERAGE GLUCOSE: 123 MG/DL (ref 70–126)
BACTERIA: ABNORMAL
BASOPHILS # BLD: 1 %
BASOPHILS ABSOLUTE: 0.1 THOU/MM3 (ref 0–0.1)
BILIRUB SERPL-MCNC: 0.5 MG/DL (ref 0.3–1.2)
BILIRUBIN URINE: NEGATIVE
BLOOD, URINE: ABNORMAL
BUN BLDV-MCNC: 10 MG/DL (ref 7–22)
CALCIUM SERPL-MCNC: 9 MG/DL (ref 8.5–10.5)
CASTS: ABNORMAL /LPF
CASTS: ABNORMAL /LPF
CHARACTER, URINE: ABNORMAL
CHLORIDE BLD-SCNC: 101 MEQ/L (ref 98–111)
CHOLESTEROL, FASTING: 183 MG/DL (ref 100–199)
CO2: 26 MEQ/L (ref 23–33)
COLOR: YELLOW
CREAT SERPL-MCNC: 0.9 MG/DL (ref 0.4–1.2)
CRYSTALS: ABNORMAL
EOSINOPHIL # BLD: 2.5 %
EOSINOPHILS ABSOLUTE: 0.2 THOU/MM3 (ref 0–0.4)
EPITHELIAL CELLS, UA: ABNORMAL /HPF
ERYTHROCYTE [DISTWIDTH] IN BLOOD BY AUTOMATED COUNT: 11.6 % (ref 11.5–14.5)
ERYTHROCYTE [DISTWIDTH] IN BLOOD BY AUTOMATED COUNT: 40.3 FL (ref 35–45)
GFR SERPL CREATININE-BSD FRML MDRD: 64 ML/MIN/1.73M2
GLUCOSE BLD-MCNC: 101 MG/DL (ref 70–108)
GLUCOSE, URINE: NEGATIVE MG/DL
HBA1C MFR BLD: 6.1 % (ref 4.4–6.4)
HCT VFR BLD CALC: 45 % (ref 37–47)
HDLC SERPL-MCNC: 43 MG/DL
HEMOGLOBIN: 15.4 GM/DL (ref 12–16)
IMMATURE GRANS (ABS): 0.03 THOU/MM3 (ref 0–0.07)
IMMATURE GRANULOCYTES: 0.3 %
KETONES, URINE: NEGATIVE
LDL CHOLESTEROL CALCULATED: 126 MG/DL
LEUKOCYTE ESTERASE, URINE: ABNORMAL
LYMPHOCYTES # BLD: 39.6 %
LYMPHOCYTES ABSOLUTE: 3.6 THOU/MM3 (ref 1–4.8)
MCH RBC QN AUTO: 32.6 PG (ref 26–33)
MCHC RBC AUTO-ENTMCNC: 34.2 GM/DL (ref 32.2–35.5)
MCV RBC AUTO: 95.3 FL (ref 81–99)
MISCELLANEOUS LAB TEST RESULT: ABNORMAL
MONOCYTES # BLD: 6.8 %
MONOCYTES ABSOLUTE: 0.6 THOU/MM3 (ref 0.4–1.3)
MUCUS: ABNORMAL
NITRITE, URINE: NEGATIVE
NUCLEATED RED BLOOD CELLS: 0 /100 WBC
PH UA: 6 (ref 5–9)
PLATELET # BLD: 223 THOU/MM3 (ref 130–400)
PMV BLD AUTO: 10.9 FL (ref 9.4–12.4)
POTASSIUM SERPL-SCNC: 3.8 MEQ/L (ref 3.5–5.2)
PROTEIN UA: NEGATIVE MG/DL
RBC # BLD: 4.72 MILL/MM3 (ref 4.2–5.4)
RBC URINE: ABNORMAL /HPF
RENAL EPITHELIAL, UA: ABNORMAL
SEG NEUTROPHILS: 49.8 %
SEGMENTED NEUTROPHILS ABSOLUTE COUNT: 4.6 THOU/MM3 (ref 1.8–7.7)
SODIUM BLD-SCNC: 138 MEQ/L (ref 135–145)
SPECIFIC GRAVITY UA: 1.02 (ref 1–1.03)
TOTAL PROTEIN: 7.2 G/DL (ref 6.1–8)
TRIGLYCERIDE, FASTING: 68 MG/DL (ref 0–199)
UROBILINOGEN, URINE: 0.2 EU/DL (ref 0–1)
WBC # BLD: 9.2 THOU/MM3 (ref 4.8–10.8)
WBC UA: > 200 /HPF
YEAST: ABNORMAL

## 2021-09-28 PROCEDURE — 99396 PREV VISIT EST AGE 40-64: CPT | Performed by: NURSE PRACTITIONER

## 2021-09-28 PROCEDURE — 81001 URINALYSIS AUTO W/SCOPE: CPT

## 2021-09-28 PROCEDURE — 36415 COLL VENOUS BLD VENIPUNCTURE: CPT

## 2021-09-28 PROCEDURE — 80053 COMPREHEN METABOLIC PANEL: CPT

## 2021-09-28 PROCEDURE — 85025 COMPLETE CBC W/AUTO DIFF WBC: CPT

## 2021-09-28 PROCEDURE — 83036 HEMOGLOBIN GLYCOSYLATED A1C: CPT

## 2021-09-28 PROCEDURE — 80061 LIPID PANEL: CPT

## 2021-09-28 RX ORDER — LOPERAMIDE HYDROCHLORIDE 2 MG/1
2 CAPSULE ORAL 4 TIMES DAILY PRN
Qty: 90 CAPSULE | Refills: 1 | Status: SHIPPED | OUTPATIENT
Start: 2021-09-28

## 2021-09-28 RX ORDER — TOLTERODINE 4 MG/1
4 CAPSULE, EXTENDED RELEASE ORAL DAILY
Qty: 30 CAPSULE | Refills: 5 | Status: SHIPPED | OUTPATIENT
Start: 2021-09-28

## 2021-09-28 NOTE — PROGRESS NOTES
Chief Complaint   Patient presents with    Annual Exam    Urinary Frequency     over a month. wakes her up at night       History obtained from chart review and the patient. SUBJECTIVE:  Luisa Osorio is a 60 y.o. female that presents today for UTI and annual exam    Urinary Symptoms    HPI:      Symptoms have been present for several months. Difficulty initiating a stream? No  Increased urinary frequency? Yes  Weak urinary stream?  No  Dysuria? no  Nocturia? Yes, multiple times  Hematuria?  no  Perineal discomfort? No    She has had multiple kidney stones in the past.  She tried Ditropan in the past and it did help, but had such horrible side effects from it. She does have some urgency incontinence but is rare. Prediabetes  Diet is decent, she is trying cut back on sweets. No exercise. Sleeping fine overall. Mood is fine. She did have carpal tunnel surgery done in her right arm. She still has numbness/tingling in her right arm. Surgery was about 1 year ago. Paresthesias are throughout all her fingers.  strength is normal.    Also requests refill of the Imodium for IBS. It is generally well controlled. Changing diet habits have helped.     BP Readings from Last 3 Encounters:   09/28/21 (!) 150/98   10/28/20 (!) 165/99   10/22/20 120/82     Age/Gender Health Maintenance    Lipid -   Lab Results   Component Value Date    CHOL 213 (H) 09/10/2016    CHOL 180 03/14/2015     Lab Results   Component Value Date    TRIG 173 09/10/2016    TRIG 74 03/14/2015     Lab Results   Component Value Date    HDL 44 09/10/2016    HDL 49 03/14/2015     Lab Results   Component Value Date    LDLCALC 134 09/10/2016    LDLCALC 116 03/14/2015     DM Screen -   Lab Results   Component Value Date    LABA1C 5.7 10/17/2019     Colon Cancer Screening - 2015  Lung Cancer Screening (Age 54 to [de-identified] with 30 pack year hx, current smoker or quit within past 15 years) - n/a    Tetanus - needs  Influenza Vaccine - next due INSERTION performed by Janie Osman MD at [address]      KIDNEY STONE SURGERY  5/13/15    Right percutaneous nephrolithotomy     KNEE SURGERY Right     partial knee replacement    [place] OFFICE/OUTPT VISIT,PROCEDURE ONLY Left 2018    LEFT WRIST OPEN REDUCTION INTERNAL FIXATION performed by Jimmy Wiley MD at [address] [facility]   Allergen Reactions    Cipro Iv [Ciprofloxacin] Hives       Social History     Socioeconomic History    Marital status:      Spouse name: Not on file    Number of children: Not on file    Years of education: Not on file    Highest education level: Not on file   Occupational History    Not on file   Tobacco Use    Smoking status: Former Smoker     Packs/day: 0.50     Years: 20.00     Pack years: 10.00     Quit date: 1993     Years since quittin.1    Smokeless tobacco: Never Used   Vaping Use    Vaping Use: Never used   Substance and Sexual Activity    Alcohol use: Yes     Comment: socially    Drug use: No    Sexual activity: Not on file   Other Topics Concern    Not on file   Social History Narrative    Not on file     Social Determinants of Health     Financial Resource Strain:     Difficulty of Paying Living Expenses:    Food Insecurity:     Worried About Running Out of Food in the Last Year:     Ran Out of Food in the Last Year:    Transportation Needs:     Lack of Transportation (Medical):      Lack of Transportation (Non-Medical):    Physical Activity:     Days of Exercise per Week:     Minutes of Exercise per Session:    Stress:     Feeling of Stress :    Social Connections:     Frequency of Communication with Friends and Family:     Frequency of Social Gatherings with Friends and Family:     Attends Confucianist Services:     Active Member of Clubs or Organizations:     Attends Club or Organization Meetings:     Marital Status:    Intimate Partner Violence:     Fear of Current or Ex-Partner:     Emotionally Abused:     Physically Abused:     Sexually Abused:        Family History   Problem Relation Age of Onset    Diabetes Mother     High Blood Pressure Mother     Cancer Mother     Kidney Disease Mother     Mental Illness Father          I have reviewed the patient's past medical history, past surgical history, allergies, medications, social and family history and I have made updates where appropriate.       Review of Systems  Positive responses are highlighted in bold    Constitutional:  Fever, Chills, Night Sweats, Fatigue, Unexpected changes in weight  Eyes:  Eye discharge, Eye pain, Eye redness, Visual disturbances   HENT:  Ear pain, Tinnitus, Nosebleeds, Trouble swallowing, Hearing loss, Sore throat  Cardiovascular:  Chest Pain, Palpitations, Orthopnea, Paroxysmal Nocturnal Dyspnea  Respiratory:  Cough, Wheezing, Shortness of breath, Chest tightness, Apnea  Gastrointestinal:  Nausea, Vomiting, Diarrhea, Constipation, Heartburn, Blood in stool  Genitourinary:  Difficulty or painful urination, Flank pain, Change in frequency, Urgency  Skin:  Color change, Rash, Itching, Wound  Psychiatric:  Hallucinations, Anxiety, Depression, Suicidal ideation  Hematological:  Enlarged glands, Easy bleeding, Easily bruising  Musculoskeletal:  Joint pain, Back pain, Gait problems, Joint swelling, Myalgias  Neurological:  Dizziness, Headaches, Presyncope, Numbness, Seizures, Tremors  Allergy:  Environmental allergies, Food allergies  Endocrine:  Heat Intolerance, Cold Intolerance, Polydipsia, Polyphagia, Polyuria      Lab Results   Component Value Date     10/22/2020    K 3.7 10/22/2020     10/22/2020    CO2 24 10/22/2020    BUN 13 10/22/2020    CREATININE 0.8 10/22/2020    GLUCOSE 111 (H) 10/22/2020    CALCIUM 9.3 10/22/2020    PROT 7.3 03/03/2020    LABALBU 4.3 03/03/2020    BILITOT 0.4 03/03/2020    ALKPHOS 83 03/03/2020    AST 23 03/03/2020    ALT 23 03/03/2020 LABGLOM 74 (A) 10/22/2020       Lab Results   Component Value Date    WBC 7.7 03/03/2020    HGB 15.4 03/03/2020    HCT 45.0 03/03/2020    MCV 93.8 03/03/2020     03/03/2020     Lab Results   Component Value Date    TSH 2.270 09/10/2016       PHYSICAL EXAM:  Vitals:    09/28/21 0949 09/28/21 1008   BP: (!) 148/90 (!) 150/98   Pulse: 65    Resp: 16    Temp: 98.8 °F (37.1 °C)    TempSrc: Oral    Weight: 196 lb 12.8 oz (89.3 kg)    Height: 5' 3\" (1.6 m)      Body mass index is 34.86 kg/m². VS Reviewed  General Appearance: A&O x 3, No acute distress,well developed and well- nourished  Head: normocephalic and atraumatic  Eyes: pupils equal, round, and reactive to light, extraocular eye movements intact, conjunctivae and eye lids without erythema  Neck: supple and non-tender without mass, no thyromegaly or thyroid nodules, no cervical lymphadenopathy  Pulmonary/Chest: clear to auscultation bilaterally- no wheezes, rales or rhonchi, normal air movement, no respiratory distress or retractions  Cardiovascular: S1 and S2 auscultated w/ RRR. No murmurs, rubs, clicks, or gallops, distal pulses intact. Abdomen: soft, non-tender, non-distended, bowl sounds physiologic,  no rebound or guarding, no masses or hernias noted. Liver and spleen without enlargement. Extremities: no cyanosis, clubbing or edema of the lower extremities  Musculoskeletal: no bony abnormality or joint effusion  Neuro:  Alert, 5/5 strength globally and symmetrically  Psych: Affect flat. Mood normal. Thought process is normal without evidence of depression or psychosis. Good insight and appropriate interaction. Cognition and memory appear to be intact. Skin: warm and dry, no rash or erythema  Lymph:  No cervical, auricular or supraclavicular lymph nodes palpated    ASSESSMENT & PLAN  [facility] was seen today for annual exam and urinary frequency. Diagnoses and all orders for this visit:    Prediabetes  -     Lipid, Fasting;  Future  - Hemoglobin A1C; Future  -     Comprehensive Metabolic Panel; Future  -     CBC With Auto Differential; Future    Well adult health check  -     Lipid, Fasting; Future  -     Hemoglobin A1C; Future  -     Comprehensive Metabolic Panel; Future  -     CBC With Auto Differential; Future    Urge incontinence  -     tolterodine (DETROL LA) 4 MG extended release capsule; Take 1 capsule by mouth daily  -     Urinalysis With Microscopic; Future    Irritable bowel syndrome with diarrhea  -     loperamide (IMODIUM) 2 MG capsule; Take 1 capsule by mouth 4 times daily as needed for Diarrhea    Paresthesia of right arm  -     [facility] - Chel Morrison MD      - work on diet/exercise  - annual labs  - recheck BP next appt, if still elevated, will start BP medication  - med refill of Imodium  - trial Detrol, obtain UA, doubtful this is infectious though  - obtain [facility] right arm    Controlled Substance Monitoring:    Acute and Chronic Pain Monitoring:   RX Monitoring 10/22/2020   Attestation -   Periodic Controlled Substance Monitoring No signs of potential drug abuse or diversion identified. DISPOSITION    Return in about 3 weeks (around 10/19/2021) for BP recheck, recheck urinary symtpoms. Christen released without restrictions. PATIENT COUNSELING    Counseling was provided today regarding the following topics: Healthy eating habits, Regular exercise, substance abuse and healthy sleep habits. Christen received counseling on the following healthy behaviors: medication adherence    Patient given educational materials on: See Attached    I have instructed Christen to complete a self tracking handout on none and instructed them to bring it with them to her next appointment. Barriers to learning and self management: none    Discussed use, benefit, and side effects of prescribed medications. Barriers to medication compliance addressed. All patient questions answered. Pt voiced understanding. Electronically signed by OPAL Greene CNP on 9/28/2021 at 10:15 AM

## 2021-09-29 ENCOUNTER — TELEPHONE (OUTPATIENT)
Dept: FAMILY MEDICINE CLINIC | Age: 56
End: 2021-09-29

## 2021-09-29 ENCOUNTER — PROCEDURE VISIT (OUTPATIENT)
Dept: NEUROLOGY | Age: 56
End: 2021-09-29
Payer: COMMERCIAL

## 2021-09-29 DIAGNOSIS — R29.898 RIGHT ARM WEAKNESS: ICD-10-CM

## 2021-09-29 DIAGNOSIS — M54.12 CERVICAL RADICULOPATHY: Primary | ICD-10-CM

## 2021-09-29 DIAGNOSIS — R20.0 RIGHT ARM NUMBNESS: ICD-10-CM

## 2021-09-29 PROCEDURE — 95909 NRV CNDJ TST 5-6 STUDIES: CPT | Performed by: PSYCHIATRY & NEUROLOGY

## 2021-09-29 PROCEDURE — 95886 MUSC TEST DONE W/N TEST COMP: CPT | Performed by: PSYCHIATRY & NEUROLOGY

## 2021-09-29 NOTE — TELEPHONE ENCOUNTER
prior auth completed though cover my meds for tolterodine  Received approval  Pt informed and verbalized understanding.

## 2021-09-29 NOTE — TELEPHONE ENCOUNTER
----- Message from OPAL Patten - CNP sent at 9/29/2021  8:43 AM EDT -----  Let Adeline Mead know her urinalysis does have some infecting fighting cells, indicating a possible UTI. I'd like to see how the final culture comes back and then treat according to the culture, because i'm not totally convinced she has a UTI. Also the rest of her labs are stable, her A1C (DM test) is 6.1, which is higher than it was before (5.7).  still within the prediabetic range, but I would hold off on meds and treatment at this point, just work on diet, exercise, etc.

## 2021-09-29 NOTE — TELEPHONE ENCOUNTER
Received PA request from [facility] for Tolterodine Tartrate.  Will complete though cover my meds

## 2021-09-29 NOTE — TELEPHONE ENCOUNTER
----- Message from Mackenzie Kalpesh sent at 9/29/2021 10:19 AM EDT -----  Subject: Medication Problem    QUESTIONS  Name of Medication? tolterodine (DETROL LA) 4 MG extended release capsule  Patient-reported dosage and instructions? Take 1 capsule by mouth daily  What question or problem do you have with the medication? Pt. called   wanting to know if a different prescription has been sent in because her   insurance won't pay for the (Detrol). Also stated the pharmacy told her   she needs prior authorization. [facility]erred Pharm[facility]? [facility] - [place], [address]  Pharmacy phone number (if available)? [phone]  Additional Information for Provider?   ---------------------------------------------------------------------------  --------------  [address]  What is the best way for the office to contact you? OK to leave message on   voicemail  Preferred Call Back Phone Number? [phone]  ---------------------------------------------------------------------------  --------------  SCRIPT ANSWERS  Relationship to Patient?  Self

## 2021-09-29 NOTE — TELEPHONE ENCOUNTER
If it needs a PA, the pharmacy should fax us the PA and we'll fill it out.  Let's see what we get from the pharmacy first.

## 2021-10-05 ENCOUNTER — TELEPHONE (OUTPATIENT)
Dept: FAMILY MEDICINE CLINIC | Age: 56
End: 2021-10-05

## 2021-10-05 DIAGNOSIS — M54.12 CERVICAL RADICULOPATHY: Primary | ICD-10-CM

## 2021-10-05 NOTE — TELEPHONE ENCOUNTER
Pt would like to know if insurance would cover a CT scan. Pt  thinks PT will not work because she has had it about 1-2 years.

## 2021-10-05 NOTE — TELEPHONE ENCOUNTER
Pt called asking if Annamaria Mcgraw got the results form the neurologist and what the next step is.

## 2021-10-05 NOTE — TELEPHONE ENCOUNTER
The CT isn't the preferred test. It won't show the intervertebral discs like the MRI will. Honestly, the physical therapy may help some. But it's not necessarily about it \"helping\" as much as it is about jumping through the necessary hoops which insurance will require to get the MRI approved.

## 2021-10-05 NOTE — TELEPHONE ENCOUNTER
So I haven't gotten the official report from him yet, he will have to dictate this. But it looks like she has a pinched nerve in her neck and this is the cause of the numbness/weakness in her arm. Honestly, the next step would be getting an MRI of her cervical spine. I doubt insurance would approve it out of the shoot without doing some physical therapy first. Would she be ok with trying some PT first and see if it helps any? If not, then we'll definitely order the MRI cervical spine.

## 2021-10-07 ENCOUNTER — HOSPITAL ENCOUNTER (OUTPATIENT)
Age: 56
Setting detail: SPECIMEN
Discharge: HOME | End: 2021-10-07
Payer: COMMERCIAL

## 2021-10-07 ENCOUNTER — TELEPHONE (OUTPATIENT)
Dept: FAMILY MEDICINE CLINIC | Age: 56
End: 2021-10-07

## 2021-10-07 DIAGNOSIS — N39.0 ACUTE URINARY TRACT INFECTION: ICD-10-CM

## 2021-10-07 DIAGNOSIS — R35.0 URINARY FREQUENCY: Primary | ICD-10-CM

## 2021-10-07 DIAGNOSIS — R35.0 URINARY FREQUENCY: ICD-10-CM

## 2021-10-07 PROCEDURE — 87186 SC STD MICRODIL/AGAR DIL: CPT

## 2021-10-07 PROCEDURE — 87086 URINE CULTURE/COLONY COUNT: CPT

## 2021-10-07 PROCEDURE — 87077 CULTURE AEROBIC IDENTIFY: CPT

## 2021-10-07 RX ORDER — NITROFURANTOIN 25; 75 MG/1; MG/1
100 CAPSULE ORAL 2 TIMES DAILY
Qty: 14 CAPSULE | Refills: 0 | Status: SHIPPED | OUTPATIENT
Start: 2021-10-07 | End: 2021-10-14

## 2021-10-07 NOTE — TELEPHONE ENCOUNTER
----- Message from Arin Vo sent at 10/7/2021  8:49 AM EDT -----  Subject: Results Request    QUESTIONS  Which lab or imaging result is the patient calling about? Urine Test   Which provider ordered the test? Anjali Pandey   At what location was the test performed? Date the test was performed? Additional Information for Provider? Patient is requesting a phone call   with the results of her urine test   ---------------------------------------------------------------------------  --------------  CALL BACK INFO  What is the best way for the office to contact you? OK to leave message on   voicemail  Preferred Call Back Phone Number?  [phone]

## 2021-10-07 NOTE — TELEPHONE ENCOUNTER
That was my fault. I thought I had ordered the culture, but I see now that I didn't. I'm so sorry, I apologize. I placed an order for a repeat urine culture, but i'm going to put her on Abx in the mean time. Rx for Macrobid sent to [facility].

## 2021-10-07 NOTE — TELEPHONE ENCOUNTER
Patient had UA done 09.28.2021 and was informed that Kiki Arteaga was going to have her urine cultured to rule out possible UTI   ----- Message from OPAL Healy CNP sent at 9/29/2021  8:43 AM EDT -----  Let Joel Anderson know her urinalysis does have some infecting fighting cells, indicating a possible UTI. I'd like to see how the final culture comes back and then treat according to the culture, because i'm not totally convinced she has a UTI. Also the rest of her labs are stable, her A1C (DM test) is 6.1, which is higher than it was before (5.7).  still within the prediabetic range, but I would hold off on meds and treatment at this point, just work on diet, exercise, etc.    Patient states that she hasn't heard anything back about the culture results    I do not see a final culture report    Please advise

## 2021-10-08 ENCOUNTER — TELEPHONE (OUTPATIENT)
Dept: FAMILY MEDICINE CLINIC | Age: 56
End: 2021-10-08

## 2021-10-08 LAB
ORGANISM: ABNORMAL
URINE CULTURE, ROUTINE: ABNORMAL

## 2021-10-08 NOTE — TELEPHONE ENCOUNTER
----- Message from OPAL Llanes CNP sent at 10/8/2021 10:36 AM EDT -----  Let Fernando Byers know her urine culture is coming back growing bacteria. I'm awaiting the final report and sensitivity though. I do feel pretty confident that the Abx prescribed should work. Are her symptoms getting any better?

## 2021-10-08 NOTE — TELEPHONE ENCOUNTER
----- Message from Misael Chavarria sent at 10/8/2021 10:53 AM EDT -----  Subject: Message to Provider    QUESTIONS  Information for Provider? PT dropped off sample of urine to Urgent Care,   but today there is blood in her urine.  ---------------------------------------------------------------------------  --------------  CALL BACK INFO  What is the best way for the office to contact you? OK to leave message on   voicemail  Preferred Call Back Phone Number? [phone]  ---------------------------------------------------------------------------  --------------  SCRIPT ANSWERS  Relationship to Patient?  Self

## 2021-10-08 NOTE — TELEPHONE ENCOUNTER
Well, it does look like she has a UTI (see result note), so i'm not surprised she has blood in her urine. I started her on Abx, I would continue it and drink plenty of water. Monitor the hematuria for now. I would advise ER if worsening, or if developing abd pain or flank pain.

## 2021-10-08 NOTE — TELEPHONE ENCOUNTER
Spoke with pt regarding this. She had nor further questions. Let her know we would contact her regarding sensitivity results.

## 2021-10-11 ENCOUNTER — TELEPHONE (OUTPATIENT)
Dept: FAMILY MEDICINE CLINIC | Age: 56
End: 2021-10-11

## 2021-10-11 NOTE — TELEPHONE ENCOUNTER
----- Message from OPAL Tirado - CNP sent at 10/11/2021  9:40 AM EDT -----  Let Kaitlyn Mcgee know her urine culture did come back with bacteria growing but the Macrobid should take care of the infection.  Let me know if symptoms don't improve

## 2021-10-13 ENCOUNTER — HOSPITAL ENCOUNTER (OUTPATIENT)
Dept: PHYSICAL THERAPY | Age: 56
Setting detail: THERAPIES SERIES
Discharge: HOME | End: 2021-10-13
Payer: COMMERCIAL

## 2021-10-13 PROCEDURE — 97161 PT EVAL LOW COMPLEX 20 MIN: CPT

## 2021-10-13 PROCEDURE — 97110 THERAPEUTIC EXERCISES: CPT

## 2021-10-13 NOTE — PROGRESS NOTES
** PLEASE SIGN, DATE AND TIME CERTIFICATION BELOW AND RETURN TO [facility] (FAX #: [phone]). ATTEST/CO-SIGN IF ACCESSING VIA INCherrish. THANK YOU.**    I certify that I have examined the patient below and determined that Physical Medicine and Rehabilitation service is necessary and that I approve the established plan of care for up to 90 days or as specifically noted. Attestation, signature or co-signature of physician indicates approval of certification requirements.    ________________________ ____________ __________  Physician Signature   Date   Time  [address] [facility]  PHYSICAL THERAPY  [x] EVALUATION  [] DAILY NOTE (LAND) [] DAILY NOTE (AQUATIC ) [] PROGRESS NOTE [] DISCHARGE NOTE    [x] [address] [facility]   [] [place] [address]    [] [facility]   [] [facility]    Date: 10/13/2021  Patient Name:  Jose Enrique Morales  : 1965  MRN: [id number]  CSN: [id number]    Referring Practitioner OPAL Calixto -*   Diagnosis Radiculopathy, cervical region [M54.12]    Treatment Diagnosis Cervical radiculopathy, poor posture, tight postural muscles, postural weakness   Date of Evaluation 10/13/21    Additional Pertinent History Arthritis in knees (needs TKA), had partial without relief,  UTI, kidney stones. Carpal tunnel release, right ; Functional Outcome Measure Used NDI   Functional Outcome Score 18 (10/13/21)       Insurance: Primary: Payor: [address] [place] [address] [address] /  /  / ,   Secondary:    Authorization Information: PRECERTIFICATION REQUIRED: No  INSURANCE THERAPY BENEFIT: No pre-certification is needed. PT, OT and ST are allowed 30 visits each per calendar year. AQUATIC THERAPY COVERED:  Yes   MODALITIES COVERED:  Yes--Iontophoresis is not covered. Hot/Cold Packs are not covered.   TELEHEALTH COVERED:  Yes   Visit # 1, 1/10 for progress note   Visits Allowed: 30   Recertification Date:  Physician Follow-Up: 10/19/21   Physician Orders:    History of Present Illness: Pt presents with N/T down entire right arm to finger tips for past 2 years. Carpal tunnel surgery didn't help. EMG completed and showed C5-6 radiculopathy. Pt denies neck pain. SUBJECTIVE: Pt has decreased sensation, such as to find things in her purse, placing pen in hand, and also has issues with dropping objects. Pt has yet to have an MRI, d/t insurance requiring PT first. Pt able to complete ADLs but used to the symptoms. Pt tired of feeling the symptoms in her arm. Pt denies taking pain medication except Tylenol and Motrin occasionally. Social/Functional History and Current Status:  Medications and Allergies have been reviewed and are listed on Medical History Questionnaire. Lucille Goldmann lives with spouse in a single story home with stairs and no handrail to enter. Task Previous Current   ADLs  Independent Independent   IADL's Independent Modified Independent- modifies activities to prevent dropping things   Ambulation Independent Independent   Transfers Independent Independent   Recreation Independent Independent   Community Integration Independent Independent   Driving Active  Active    Work [employer]. Occupation:  at [employer]- standing and sitting, do [employer].          Objective:    GENERAL   Pain 9/10 right arm, 4/10 at best   Palpation Tight right upper trap and levator, tender C6 spinous process   Sensation Moderate impairment- no temperature sensation or object recognition   Observation Pt reports she sleeps prone   Edema    Accessory Motion          POSTURE     Comments   Forward Head x    Rounded Shoulders x    Kyphosis x    Lordosis     Lateral Shift     Scoliosis         NECK RANGE OF MOTION   Flexion 18    Extension 52   Rotation Right 40   Rotation Left 38   Sidebending Right 20   Sidebending Left 32   Retraction Limited 50% with worsening arm symptoms   Protraction Neck Range of Motion is [facility]  []     UPPER EXTREMITY RANGE OF MOTION    Left Right Comments   Shoulder Flexion      Shoulder Extension      Shoulder Abduction      Shoulder Adduction      Shoulder External Rotation      Shoulder Internal Rotation      Shoulder Range of Motion is [facility]  [x] Pain with ER and IR, tight pectorals noted      Elbow Flexion      Elbow Extension      Elbow Range of Motion is [facility]  [x]       UPPER EXTREMITY STRENGTH    Left Right Comments   Shoulder Flexion 5 4    Shoulder Extension 5 4    Shoulder Abduction 5 4    Shoulder Adduction      Shoulder External Rotation 5 4+    Shoulder Internal Rotation 5 4+          Elbow Flexion 5 4+    Elbow Extension 5 4-    Forearm Pronation      Forearm Supination      [] Elbow Strength is grossly WFL.  Strength 45# 20#    *Pain with all left UE testing    SPECIAL TESTS (+/-)    Left Right Comments   Cerv. Compression -     Cerv. Distraction -     Cerv.  Alar/Transverse      Vertebral Artery      Spurling's  -        TREATMENT   Precautions:    Pain: 9/10 right arm N/T    X in shaded column indicates activity completed today   Modalities Parameters/  Location  Notes                     Manual Therapy Time/Technique  Notes                     Exercise/Intervention   Notes   Supine cervical retraction 10  x No change               Cervical retraction with upper cervical flexion 10  x No change                Cervical retraction with extension 10  x No change          Backward shoulder rolls   x Pt demo'd for HEP   Scapular retractions   X  Pt demo'd for HEP, cues for upper trap relaxation                                        Specific Interventions Next Treatment: manual, modalities, postural strengthening, gentle ROM/stretching, stabilization as tolerated    Activity/Treatment Tolerance:  [x]  Patient tolerated treatment well  []  Patient limited by fatigue  [x]  Patient limited by pain   []  Patient limited by medical complications  []  Other: Assessment: 60year old presents with chronic C5-6 radiculopathy causing right arm N/T. Pt has forward head and rounded shoulder posture, causing tightness in right cervical muscles and postural muscles. Pt demos postural weakness. Unable to reduce right arm symptoms with repetitive movement testing. Pt will benefit from skilled PT to decrease tightness and reduce RUE radiculopathy. Body Structures/Functions/Activity Limitations: impaired ROM, impaired strength, pain and abnormal posture  Prognosis: fair    GOALS:  Patient Goal: Reduce right UE N/T    Short Term Goals: defer to LTGs    Long Term Goals: 8 weeks  Patient will improve AROM of cervical lateral flexion from 20 to 30 degrees to decrease tightness for less RUE N/T to improve sensation. Patient will demonstrate good postural awareness without cues during therapy session to carry over to sitting and standing posture with IADLs and work duties. Patient will improve shoulder and tricep strength to 4+/5 and right  strength to 30# to reduce dropping objects. Patient will report reduced pain level to <5/10 at worst to tolerate daily activities. Patient will be independent and compliant with HEP daily to achieve above goals. Patient Education:   [x]  HEP/Education Completed: Plan of Care, Goals, attendance policy, postural exercises, unloaded retractions, towel roll in base of pillow, sleep sidelying   [employer]bridge Access Code:  []  No new Education completed  []  Reviewed Prior HEP      [x]  Patient verbalized and/or demonstrated understanding of education provided. []  Patient unable to verbalize and/or demonstrate understanding of education provided. Will continue education. []  Barriers to learning:     PLAN:  Treatment Recommendations: Strengthening, Range of Motion, Manual Therapy - Soft Tissue Mobilization, Home Exercise Program, Patient Education, Aquatics and Modalities    [x]  Plan of care initiated.   Plan to see patient 2 times per week for 8 weeks to address the treatment planned outlined above.   []  Continue with current plan of care  []  Modify plan of care as follows:    []  Hold pending physician visit  []  Discharge    Time In 0900   Time Out 0950   Timed Code Minutes: 8 min   Total Treatment Time: 50 min       Electronically Signed by: Precious Jeffers PT

## 2021-10-15 ENCOUNTER — HOSPITAL ENCOUNTER (OUTPATIENT)
Dept: PHYSICAL THERAPY | Age: 56
Setting detail: THERAPIES SERIES
Discharge: HOME | End: 2021-10-15
Payer: COMMERCIAL

## 2021-10-15 PROCEDURE — 97110 THERAPEUTIC EXERCISES: CPT

## 2021-10-15 PROCEDURE — 97035 APP MDLTY 1+ULTRASOUND EA 15: CPT

## 2021-10-15 NOTE — PROGRESS NOTES
Access Code:  []  No new Education completed  []  Reviewed Prior HEP      [x]  Patient verbalized and/or demonstrated understanding of education provided. []  Patient unable to verbalize and/or demonstrate understanding of education provided. Will continue education. []  Barriers to learning:     PLAN:  Treatment Recommendations: Strengthening, Range of Motion, Manual Therapy - Soft Tissue Mobilization, Home Exercise Program, Patient Education, Aquatics and Modalities    []  Plan of care initiated. Plan to see patient 2 times per week for 8 weeks to address the treatment planned outlined above.   [x]  Continue with current plan of care  []  Modify plan of care as follows:    []  Hold pending physician visit  []  Discharge    Time In 1530   Time Out 1600   Timed Code Minutes: 30 min   Total Treatment Time: 30 min       Electronically Signed by: Mary Choe PT

## 2021-10-19 ENCOUNTER — HOSPITAL ENCOUNTER (OUTPATIENT)
Dept: PHYSICAL THERAPY | Age: 56
Setting detail: THERAPIES SERIES
Discharge: HOME | End: 2021-10-19
Payer: COMMERCIAL

## 2021-10-19 ENCOUNTER — OFFICE VISIT (OUTPATIENT)
Dept: FAMILY MEDICINE CLINIC | Age: 56
End: 2021-10-19
Payer: COMMERCIAL

## 2021-10-19 VITALS
BODY MASS INDEX: 35.99 KG/M2 | WEIGHT: 203.13 LBS | SYSTOLIC BLOOD PRESSURE: 136 MMHG | HEIGHT: 63 IN | OXYGEN SATURATION: 96 % | TEMPERATURE: 98.5 F | DIASTOLIC BLOOD PRESSURE: 84 MMHG | RESPIRATION RATE: 16 BRPM | HEART RATE: 66 BPM

## 2021-10-19 DIAGNOSIS — M54.12 CERVICAL RADICULOPATHY: ICD-10-CM

## 2021-10-19 DIAGNOSIS — R60.9 DEPENDENT EDEMA: ICD-10-CM

## 2021-10-19 DIAGNOSIS — Z01.30 BP CHECK: ICD-10-CM

## 2021-10-19 DIAGNOSIS — N39.0 ACUTE URINARY TRACT INFECTION: Primary | ICD-10-CM

## 2021-10-19 LAB
BILIRUBIN URINE: NEGATIVE
BLOOD URINE, POC: ABNORMAL
CHARACTER, URINE: CLEAR
COLOR, URINE: YELLOW
GLUCOSE URINE: NEGATIVE MG/DL
KETONES, URINE: NEGATIVE
LEUKOCYTE CLUMPS, URINE: ABNORMAL
NITRITE, URINE: POSITIVE
PH, URINE: 6 (ref 5–9)
PROTEIN, URINE: 30 MG/DL
SPECIFIC GRAVITY, URINE: 1.02 (ref 1–1.03)
UROBILINOGEN, URINE: 0.2 EU/DL (ref 0–1)

## 2021-10-19 PROCEDURE — 1036F TOBACCO NON-USER: CPT | Performed by: NURSE PRACTITIONER

## 2021-10-19 PROCEDURE — G8484 FLU IMMUNIZE NO ADMIN: HCPCS | Performed by: NURSE PRACTITIONER

## 2021-10-19 PROCEDURE — G8417 CALC BMI ABV UP PARAM F/U: HCPCS | Performed by: NURSE PRACTITIONER

## 2021-10-19 PROCEDURE — 3017F COLORECTAL CA SCREEN DOC REV: CPT | Performed by: NURSE PRACTITIONER

## 2021-10-19 PROCEDURE — 99213 OFFICE O/P EST LOW 20 MIN: CPT | Performed by: NURSE PRACTITIONER

## 2021-10-19 PROCEDURE — G8427 DOCREV CUR MEDS BY ELIG CLIN: HCPCS | Performed by: NURSE PRACTITIONER

## 2021-10-19 PROCEDURE — 97140 MANUAL THERAPY 1/> REGIONS: CPT

## 2021-10-19 RX ORDER — SULFAMETHOXAZOLE AND TRIMETHOPRIM 800; 160 MG/1; MG/1
1 TABLET ORAL 2 TIMES DAILY
Qty: 20 TABLET | Refills: 0 | Status: SHIPPED | OUTPATIENT
Start: 2021-10-19 | End: 2021-10-29

## 2021-10-19 NOTE — PROGRESS NOTES
[facility]  PHYSICAL THERAPY  [x] DAILY NOTE (LAND) [] DAILY NOTE (AQUATIC ) [] PROGRESS NOTE [] DISCHARGE NOTE    [x] [facility] [place]   [] [place] [address]    [] [facility]   [] [facility]    Date: 10/19/2021  Patient Name:  Rafael Knight  : 1965  MRN: [id number]  CSN: [id number]    Referring Practitioner OPAL Fan -*   Diagnosis Radiculopathy, cervical region [M54.12]    Treatment Diagnosis Cervical radiculopathy, poor posture, tight postural muscles, postural weakness   Date of Evaluation 10/13/21    Additional Pertinent History Arthritis in knees (needs TKA), had partial without relief,  UTI, kidney stones. Carpal tunnel release, right 2020; Functional Outcome Measure Used NDI   Functional Outcome Score 18 (10/13/21)       Insurance: Primary: Payor: [address] [place] [address]  [address] /  /  / ,   Secondary:    Authorization Information: PRECERTIFICATION REQUIRED: No  INSURANCE THERAPY BENEFIT: No pre-certification is needed. PT, OT and ST are allowed 30 visits each per calendar year. AQUATIC THERAPY COVERED:  Yes   MODALITIES COVERED:  Yes--Iontophoresis is not covered. Hot/Cold Packs are not covered. TELEHEALTH COVERED:  Yes   Visit # 3, 3/10 for progress note   Visits Allowed: 30   Recertification Date:    Physician Follow-Up: 10/19/21   Physician Orders:    History of Present Illness: Pt presents with N/T down entire right arm to finger tips for past 2 years. Carpal tunnel surgery didn't help. EMG completed and showed C5-6 radiculopathy. Pt denies neck pain. SUBJECTIVE: Patient reporting noticing no difference following US performed last session. Patient late to session today.   Objective  TREATMENT   Precautions:    Pain: 8-9/10 right arm N/T    X in shaded column indicates activity completed today   Modalities Parameters/  Location  Notes   Ultrasound to right UT 3MHz, 50% 0.8w/cm2 x8 minutes Manual Therapy Time/Technique  Notes   STM with Hawk  #8 to right/left upper traps  15 minutes total x Redness noted following with slight petechiae               Exercise/Intervention   Notes   Supine cervical retraction 10   No change               Cervical retraction with upper cervical flexion 10   No change                Cervical retraction with extension 10   No change          Backward shoulder rolls 10   Pt demo'd for HEP   Scapular retractions 10    Pt demo'd for HEP, cues for upper trap relaxation   Loaded cervical retractions 10   Cues for posture   Doorway/pec stretch 3 15 sec                                   Specific Interventions Next Treatment: manual, modalities, postural strengthening, gentle ROM/stretching, stabilization as tolerated    Activity/Treatment Tolerance:  [x]  Patient tolerated treatment well  []  Patient limited by fatigue  [x]  Patient limited by pain   []  Patient limited by medical complications  []  Other:     Assessment:Patient late to session today so only time for manual work. Tightness and knotty tissue noted with patient reporting tenderness. Patient reporting manual felt good but reporting no change in pain. GOALS:  Patient Goal: Reduce right UE N/T    Short Term Goals: defer to LTG's    Long Term Goals: 8 weeks  Patient will improve AROM of cervical lateral flexion from 20 to 30 degrees to decrease tightness for less RUE N/T to improve sensation. Patient will demonstrate good postural awareness without cues during therapy session to carry over to sitting and standing posture with IADL's and work duties. Patient will improve shoulder and triceps strength to 4+/5 and right  strength to 30# to reduce dropping objects. Patient will report reduced pain level to <5/10 at worst to tolerate daily activities. Patient will be independent and compliant with HEP daily to achieve above goals.          Patient Education:    [x]  HEP/Education Completed: purpose of manual work. Possible bruising. Heating pad at home if needed.  [employer] I/O Access Code:  []  No new Education completed  []  Reviewed Prior HEP      [x]  Patient verbalized and/or demonstrated understanding of education provided. []  Patient unable to verbalize and/or demonstrate understanding of education provided. Will continue education. []  Barriers to learning:     PLAN:  Treatment Recommendations: Strengthening, Range of Motion, Manual Therapy - Soft Tissue Mobilization, Home Exercise Program, Patient Education, Aquatics and Modalities    []  Plan of care initiated. Plan to see patient 2 times per week for 8 weeks to address the treatment planned outlined above.   [x]  Continue with current plan of care  []  Modify plan of care as follows:    []  Hold pending physician visit  []  Discharge    Time In 1056   Time Out 1117   Timed Code Minutes: 21 min   Total Treatment Time: 21 min       Electronically Signed by: Rachel Nettles PTA

## 2021-10-19 NOTE — PROGRESS NOTES
Chief Complaint   Patient presents with    Hypertension     follow up     Urinary Tract Infection     follow up, freq, cloudy        History obtained from chart review and the patient. SUBJECTIVE:  Patricia Henderson is a 60 y.o. female that presents today for UTI and annual exam    Urinary Symptoms    HPI:      Symptoms have been present for several months. Difficulty initiating a stream? No  Increased urinary frequency? Yes  Weak urinary stream?  No  Dysuria? no  Nocturia? Yes, multiple times  Hematuria?  no  Perineal discomfort? No    She has had multiple kidney stones in the past.  She tried Ditropan in the past and it did help, but had such horrible side effects from it. She does have some urgency incontinence but is rare. She is taking the Detrol but it's not helping. Here for BP check. She has been having bilat leg and ankle swelling. Usually worse if she's on her feet all day. It is better in the mornings. BP Readings from Last 3 Encounters:   10/19/21 136/84   09/28/21 (!) 150/98   10/28/20 (!) 165/99     Cervical Radiculopathy  She underwent EMG because her right arm is numb. EMG did show active C5-C6 radiculopathy. She is currently doing physical therapy which is not helping very much.     Age/Gender Health Maintenance    Lipid -   Lab Results   Component Value Date    CHOL 213 (H) 09/10/2016    CHOL 180 03/14/2015     Lab Results   Component Value Date    TRIG 173 09/10/2016    TRIG 74 03/14/2015     Lab Results   Component Value Date    HDL 43 09/28/2021    HDL 44 09/10/2016    HDL 49 03/14/2015     Lab Results   Component Value Date    LDLCALC 126 09/28/2021    LDLCALC 134 09/10/2016    LDLCALC 116 03/14/2015     DM Screen -   Lab Results   Component Value Date    LABA1C 6.1 09/28/2021     Colon Cancer Screening - 2015  Lung Cancer Screening (Age 54 to [de-identified] with 30 pack year hx, current smoker or quit within past 15 years) - n/a    Tetanus - needs  Influenza Vaccine - next due 8/16  Pneumonia Vaccine - 63  Zostavax - 48  HPV Vaccine - n/a    Breast Cancer Screening - 7/15  Cervical Cancer Screening - n/a, had hysterectomy  Osteoporosis Screening - 70  Chlamydia Screen - n/a    AAA Screening - n/a    Falls screening - n/a    Current Outpatient Medications   Medication Sig Dispense Refill    sulfamethoxazole-trimethoprim (BACTRIM DS;SEPTRA DS) 800-160 MG per tablet Take 1 tablet by mouth 2 times daily for 10 days 20 tablet 0    loperamide (IMODIUM) 2 MG capsule Take 1 capsule by mouth 4 times daily as needed for Diarrhea 90 capsule 1    tolterodine (DETROL LA) 4 MG extended release capsule Take 1 capsule by mouth daily 30 capsule 5    silodosin (RAPAFLO) 4 MG CAPS capsule Take 1 capsule by mouth every evening 30 capsule 0     No current facility-administered medications for this visit. Orders Placed This Encounter   Medications    sulfamethoxazole-trimethoprim (BACTRIM DS;SEPTRA DS) 800-160 MG per tablet     Sig: Take 1 tablet by mouth 2 times daily for 10 days     Dispense:  20 tablet     Refill:  0         All medications reviewed and reconciled, including OTC and herbal medications. Updated list given to patient.        Patient Active Problem List   Diagnosis    ANKUR (stress urinary incontinence, female)    History of lithotripsy    Renal calculus    KATI (generalized anxiety disorder)    Moderate episode of recurrent major depressive disorder (HCC)    Recurrent UTI    Ureteral stricture    Prediabetes    Primary osteoarthritis of both knees    Irritable bowel syndrome with diarrhea    Cervical radiculopathy       Past Medical History:   Diagnosis Date    Depression     KATI (generalized anxiety disorder)     GERD (gastroesophageal reflux disease)     Hypertension     Kidney stone     Prediabetes 10/18/2019       Past Surgical History:   Procedure Laterality Date    CHOLECYSTECTOMY      COLONOSCOPY      CYSTO/URETERO/PYELOSCOPY, CALCULUS TX N/A 8/4/2017 CYSTOSCOPY, RIGHT URETEROSCOPY, BALLOON DILATION, STENT INSERTION performed by Elina Devi MD at [address], [facility]      KIDNEY STONE SURGERY  5/13/15    Right percutaneous nephrolithotomy     KNEE SURGERY Right     partial knee replacement    PA OFFICE/OUTPT VISIT,PROCEDURE ONLY Left 2018    LEFT WRIST OPEN REDUCTION INTERNAL FIXATION performed by Louise Boland MD at [address]   Allergen Reactions    Cipro Iv [Ciprofloxacin] Hives       Social History     Socioeconomic History    Marital status:      Spouse name: Not on file    Number of children: Not on file    Years of education: Not on file    Highest education level: Not on file   Occupational History    Not on file   Tobacco Use    Smoking status: Former Smoker     Packs/day: 0.50     Years: 20.00     Pack years: 10.00     Quit date: 1993     Years since quittin.1    Smokeless tobacco: Never Used   Vaping Use    Vaping Use: Never used   Substance and Sexual Activity    Alcohol use: Yes     Comment: socially    Drug use: No    Sexual activity: Not on file   Other Topics Concern    Not on file   Social History Narrative    Not on file     Social Determinants of Health     Financial Resource Strain:     Difficulty of Paying Living Expenses:    Food Insecurity:     Worried About Running Out of Food in the Last Year:     Ran Out of Food in the Last Year:    Transportation Needs:     Lack of Transportation (Medical):      Lack of Transportation (Non-Medical):    Physical Activity:     Days of Exercise per Week:     Minutes of Exercise per Session:    Stress:     Feeling of Stress :    Social Connections:     Frequency of Communication with Friends and Family:     Frequency of Social Gatherings with Friends and Family:     Attends Jainism Services:     Active Member of Clubs or Organizations:     Attends Club or Organization Meetings:    Elizabeth Marital Status:    Intimate Partner Violence:     Fear of Current or Ex-Partner:     Emotionally Abused:     Physically Abused:     Sexually Abused:        Family History   Problem Relation Age of Onset    Diabetes Mother     High Blood Pressure Mother     Cancer Mother     Kidney Disease Mother     Mental Illness Father          I have reviewed the patient's past medical history, past surgical history, allergies, medications, social and family history and I have made updates where appropriate.       Review of Systems  Positive responses are highlighted in bold    Constitutional:  Fever, Chills, Night Sweats, Fatigue, Unexpected changes in weight  Eyes:  Eye discharge, Eye pain, Eye redness, Visual disturbances   HENT:  Ear pain, Tinnitus, Nosebleeds, Trouble swallowing, Hearing loss, Sore throat  Cardiovascular:  Chest Pain, Palpitations, Orthopnea, Paroxysmal Nocturnal Dyspnea  Respiratory:  Cough, Wheezing, Shortness of breath, Chest tightness, Apnea  Gastrointestinal:  Nausea, Vomiting, Diarrhea, Constipation, Heartburn, Blood in stool  Genitourinary:  Difficulty or painful urination, Flank pain, Change in frequency, Urgency  Skin:  Color change, Rash, Itching, Wound  Psychiatric:  Hallucinations, Anxiety, Depression, Suicidal ideation  Hematological:  Enlarged glands, Easy bleeding, Easily bruising  Musculoskeletal:  Joint pain, Back pain, Gait problems, Joint swelling, Myalgias  Neurological:  Dizziness, Headaches, Presyncope, Numbness, Seizures, Tremors  Allergy:  Environmental allergies, Food allergies  Endocrine:  Heat Intolerance, Cold Intolerance, Polydipsia, Polyphagia, Polyuria      Lab Results   Component Value Date     09/28/2021    K 3.8 09/28/2021     09/28/2021    CO2 26 09/28/2021    BUN 10 09/28/2021    CREATININE 0.9 09/28/2021    GLUCOSE 101 09/28/2021    CALCIUM 9.0 09/28/2021    PROT 7.2 09/28/2021    LABALBU 4.3 09/28/2021    BILITOT 0.5 09/28/2021    ALKPHOS 100 09/28/2021    AST 22 09/28/2021    ALT 22 09/28/2021    LABGLOM 64 (A) 09/28/2021       Lab Results   Component Value Date    WBC 9.2 09/28/2021    HGB 15.4 09/28/2021    HCT 45.0 09/28/2021    MCV 95.3 09/28/2021     09/28/2021     Lab Results   Component Value Date    TSH 2.270 09/10/2016       PHYSICAL EXAM:  Vitals:    10/19/21 0915   BP: 136/84   Pulse: 66   Resp: 16   Temp: 98.5 °F (36.9 °C)   TempSrc: Oral   SpO2: 96%   Weight: 203 lb 2 oz (92.1 kg)   Height: 5' 3\" (1.6 m)     Body mass index is 35.98 kg/m². VS Reviewed  General Appearance: A&O x 3, No acute distress,well developed and well- nourished  Head: normocephalic and atraumatic  Eyes: pupils equal, round, and reactive to light, extraocular eye movements intact, conjunctivae and eye lids without erythema  Neck: supple and non-tender without mass, no thyromegaly or thyroid nodules, no cervical lymphadenopathy  Pulmonary/Chest: clear to auscultation bilaterally- no wheezes, rales or rhonchi, normal air movement, no respiratory distress or retractions  Cardiovascular: S1 and S2 auscultated w/ RRR. No murmurs, rubs, clicks, or gallops, distal pulses intact. Abdomen: soft, non-tender, non-distended, bowl sounds physiologic,  no rebound or guarding, no masses or hernias noted. Liver and spleen without enlargement. Extremities: no cyanosis, clubbing or edema of the lower extremities  Musculoskeletal: no bony abnormality or joint effusion  Neuro:  Alert, 5/5 strength globally and symmetrically  Psych: Affect flat. Mood normal. Thought process is normal without evidence of depression or psychosis. Good insight and appropriate interaction. Cognition and memory appear to be intact. Skin: warm and dry, no rash or erythema  Lymph:  No cervical, auricular or supraclavicular lymph nodes palpated    ASSESSMENT & PLAN  [facility] [address] [place] [address] was seen today for hypertension and urinary tract infection.     Diagnoses and all orders for this visit:    Acute urinary tract infection  -     sulfamethoxazole-trimethoprim (BACTRIM DS;SEPTRA DS) 800-160 MG per tablet; Take 1 tablet by mouth 2 times daily for 10 days  -     POCT Urinalysis No Micro (Auto)  -     Culture, Urine; Future    Dependent edema    BP check    Cervical radiculopathy      - failed Macrobid, which actually was resistant  - start Bactrim and send for culture  - rec'd compression hose for edema  - BP reasonable today, con't to monitor  - con't with PT, will likely need MRI  - discussed options of pain management vs surgery, she will consider    Controlled Substance Monitoring:    Acute and Chronic Pain Monitoring:   RX Monitoring 10/22/2020   Attestation -   Periodic Controlled Substance Monitoring No signs of potential drug abuse or diversion identified. DISPOSITION    Return if symptoms worsen or fail to improve. Christen released without restrictions. PATIENT COUNSELING    Counseling was provided today regarding the following topics: Healthy eating habits, Regular exercise, substance abuse and healthy sleep habits. Christen received counseling on the following healthy behaviors: medication adherence    Patient given educational materials on: See Attached    I have instructed Christen to complete a self tracking handout on none and instructed them to bring it with them to her next appointment. Barriers to learning and self management: none    Discussed use, benefit, and side effects of prescribed medications. Barriers to medication compliance addressed. All patient questions answered. Pt voiced understanding.        Electronically signed by OPAL Rodriguez CNP on 10/19/2021 at 9:46 AM

## 2021-10-20 LAB
ORGANISM: ABNORMAL
URINE CULTURE, ROUTINE: ABNORMAL

## 2021-10-21 ENCOUNTER — TELEPHONE (OUTPATIENT)
Dept: FAMILY MEDICINE CLINIC | Age: 56
End: 2021-10-21

## 2021-10-21 ENCOUNTER — HOSPITAL ENCOUNTER (OUTPATIENT)
Dept: PHYSICAL THERAPY | Age: 56
Setting detail: THERAPIES SERIES
End: 2021-10-21
Payer: COMMERCIAL

## 2021-10-21 NOTE — TELEPHONE ENCOUNTER
----- Message from OPAL Zapata CNP sent at 10/21/2021  8:26 AM EDT -----  Let [facility] know her urine culture is growing bacteria. But the Bactrim should take care of it. Are her symptoms improving?

## 2021-10-26 ENCOUNTER — HOSPITAL ENCOUNTER (OUTPATIENT)
Dept: PHYSICAL THERAPY | Age: 56
Setting detail: THERAPIES SERIES
Discharge: HOME | End: 2021-10-26
Payer: COMMERCIAL

## 2021-10-26 PROCEDURE — 97035 APP MDLTY 1+ULTRASOUND EA 15: CPT

## 2021-10-26 NOTE — PROGRESS NOTES
[address] [facility]  PHYSICAL THERAPY  [x] DAILY NOTE (LAND) [] DAILY NOTE (AQUATIC ) [] PROGRESS NOTE [] DISCHARGE NOTE    [x] [facility] [place]   [] [place] [address]    [] [facility]   [] Delma Crimes    Date: 10/26/2021  Patient Name:  Sylvester Mace  : 1965  MRN: [id number]  CSN: [id number]    Referring Practitioner OPAL Castillo -*   Diagnosis Radiculopathy, cervical region [M54.12]    Treatment Diagnosis Cervical radiculopathy, poor posture, tight postural muscles, postural weakness   Date of Evaluation 10/13/21    Additional Pertinent History Arthritis in knees (needs TKA), had partial without relief,  UTI, kidney stones. Carpal tunnel release, right ; Functional Outcome Measure Used NDI   Functional Outcome Score 18 (10/13/21)       Insurance: Primary: Payor: [address] [place] [address]  [address] /  /  / ,   Secondary:    Authorization Information: PRECERTIFICATION REQUIRED: No  INSURANCE THERAPY BENEFIT: No pre-certification is needed. PT, OT and ST are allowed 30 visits each per calendar year. AQUATIC THERAPY COVERED:  Yes   MODALITIES COVERED:  Yes--Iontophoresis is not covered. Hot/Cold Packs are not covered. TELEHEALTH COVERED:  Yes   Visit # 4, 4/10 for progress note   Visits Allowed: 30   Recertification Date:    Physician Follow-Up: 10/19/21   Physician Orders:    History of Present Illness: Pt presents with N/T down entire right arm to finger tips for past 2 years. Carpal tunnel surgery didn't help. EMG completed and showed C5-6 radiculopathy. Pt denies neck pain. SUBJECTIVE: Patient reports her right arm still feels the same. Pt notes manual work last session felt good but didn't provide any relief. Pt tried using heating pad at night but doesn't help. Pt using towel roll in base of pillow which is helping her sleep a little better.  Pt arrived 15 minutes late, stating she couldn't find a parking spot.     Objective  TREATMENT   Precautions:    Pain: 8-9/10 right arm N/T    X in shaded column indicates activity completed today   Modalities Parameters/  Location  Notes   Ultrasound. estim combo to right UT 1MHz, cont, 1/0 w/cm2 x8 minutes, premod intensity 8 x No immediate change in right arm N/T               Manual Therapy Time/Technique  Notes   STM with Hawk  #8 to right/left upper traps  15 minutes total  Redness noted following with slight petechiae               Exercise/Intervention   Notes   Supine cervical retraction 10   No change               Cervical retraction with upper cervical flexion 10   No change                Cervical retraction with extension 10   No change          Backward shoulder rolls 10   Pt demo'd for HEP   Scapular retractions 10    Pt demo'd for HEP, cues for upper trap relaxation   Loaded cervical retractions 10   Cues for posture   Doorway/pec stretch 3 15 sec                                   Specific Interventions Next Treatment: manual, modalities, postural strengthening, gentle ROM/stretching, stabilization as tolerated    Activity/Treatment Tolerance:  [x]  Patient tolerated treatment well  []  Patient limited by fatigue  [x]  Patient limited by pain   []  Patient limited by medical complications  []  Other:     Assessment:Patient again late to session, only allowing for estim/ultrasound combo. Pt very tight in right upper trap with less tightness noted post-combo treatment. Continue with combo next session, even if no change in symptoms. GOALS:  Patient Goal: Reduce right UE N/T    Short Term Goals: defer to LTG's    Long Term Goals: 8 weeks  Patient will improve AROM of cervical lateral flexion from 20 to 30 degrees to decrease tightness for less RUE N/T to improve sensation. Patient will demonstrate good postural awareness without cues during therapy session to carry over to sitting and standing posture with IADL's and work duties.    Patient will improve shoulder and triceps strength to 4+/5 and right  strength to 30# to reduce dropping objects. Patient will report reduced pain level to <5/10 at worst to tolerate daily activities. Patient will be independent and compliant with HEP daily to achieve above goals. Patient Education:   [x] HEP/Education Completed: monitor response to [employer]   IQMax Access Code:  []  No new Education completed  []  Reviewed Prior HEP      [x]  Patient verbalized and/or demonstrated understanding of education provided. []  Patient unable to verbalize and/or demonstrate understanding of education provided. Will continue education. []  Barriers to learning:     PLAN:  Treatment Recommendations: Strengthening, Range of Motion, Manual Therapy - Soft Tissue Mobilization, Home Exercise Program, Patient Education, Aquatics and Modalities    []  Plan of care initiated. Plan to see patient 2 times per week for 8 weeks to address the treatment planned outlined above.   [x]  Continue with current plan of care  []  Modify plan of care as follows:    []  Hold pending physician visit  []  Discharge    Time In 0930   Time Out 0948   Timed Code Minutes: 10 min   Total Treatment Time: 18 min       Electronically Signed by: Angeles Bush PT

## 2021-10-28 ENCOUNTER — HOSPITAL ENCOUNTER (OUTPATIENT)
Dept: PHYSICAL THERAPY | Age: 56
Setting detail: THERAPIES SERIES
Discharge: HOME | End: 2021-10-28
Payer: COMMERCIAL

## 2021-10-28 PROCEDURE — 97035 APP MDLTY 1+ULTRASOUND EA 15: CPT

## 2021-10-28 NOTE — PROGRESS NOTES
[address] [facility]  PHYSICAL THERAPY  [x] DAILY NOTE (LAND) [] DAILY NOTE (AQUATIC ) [] PROGRESS NOTE [] DISCHARGE NOTE    [x] [facility] [place]   [] [place] [address]    [] [facility]   [] Delma Crimes    Date: 10/28/2021  Patient Name:  Sylvester Mace  : 1965  MRN: [id number]  CSN: [id number]    Referring Practitioner OPAL Castillo -*   Diagnosis Radiculopathy, cervical region [M54.12]    Treatment Diagnosis Cervical radiculopathy, poor posture, tight postural muscles, postural weakness   Date of Evaluation 10/13/21    Additional Pertinent History Arthritis in knees (needs TKA), had partial without relief,  UTI, kidney stones. Carpal tunnel release, right ; Functional Outcome Measure Used NDI   Functional Outcome Score 18 (10/13/21)       Insurance: Primary: Payor: [address] [place] [address]  [address] /  /  / ,   Secondary:    Authorization Information: PRECERTIFICATION REQUIRED: No  INSURANCE THERAPY BENEFIT: No pre-certification is needed. PT, OT and ST are allowed 30 visits each per calendar year. AQUATIC THERAPY COVERED:  Yes   MODALITIES COVERED:  Yes--Iontophoresis is not covered. Hot/Cold Packs are not covered. TELEHEALTH COVERED:  Yes   Visit # 5, 510 for progress note   Visits Allowed: 30   Recertification Date:    Physician Follow-Up: 10/19/21   Physician Orders:    History of Present Illness: Pt presents with N/T down entire right arm to finger tips for past 2 years. Carpal tunnel surgery didn't help. EMG completed and showed C5-6 radiculopathy. Pt denies neck pain. SUBJECTIVE: Patient reports no change after the combo last session. Patient reports 8-9/10 pain upon arrival with continued numbness and tingling. Patient arrives 11 minutes late today.      Objective  TREATMENT   Precautions:    Pain: 8-9/10 right arm N/T    X in shaded column indicates activity completed today   Modalities Parameters/  Location  Notes   Ultrasound estim combo to right UT 1MHz, cont, 1/0 w/cm2 x8 minutes, premod intensity 8 x No immediate change in right arm N/T               Manual Therapy Time/Technique  Notes   STM with Hawk  #8 to right/left upper traps  15 minutes total  Redness noted following with slight petechiae               Exercise/Intervention   Notes   Supine cervical retraction 10   No change               Cervical retraction with upper cervical flexion 10   No change                Cervical retraction with extension 10   No change          Backward shoulder rolls 10  x    Scapular retractions 10   x cues for upper trap relaxation   Loaded cervical retractions 10  x Cues for posture   Doorway/pec stretch 3 15 sec x                                  Specific Interventions Next Treatment: manual, modalities, postural strengthening, gentle ROM/stretching, stabilization as tolerated    Activity/Treatment Tolerance:  [x]  Patient tolerated treatment well  []  Patient limited by fatigue  [x]  Patient limited by pain   []  Patient limited by medical complications  []  Other:     Assessment: Patient again late to session. Patient does require cues for chin tucks. Patient denies any change in pain or symptoms after exercises and combo treatment. Patient will be progressed as able to improve functional mobility. GOALS:  Patient Goal: Reduce right UE N/T    Short Term Goals: defer to LTG's    Long Term Goals: 8 weeks  Patient will improve AROM of cervical lateral flexion from 20 to 30 degrees to decrease tightness for less RUE N/T to improve sensation. Patient will demonstrate good postural awareness without cues during therapy session to carry over to sitting and standing posture with IADL's and work duties. Patient will improve shoulder and triceps strength to 4+/5 and right  strength to 30# to reduce dropping objects.     Patient will report reduced pain level to <5/10 at worst to tolerate daily activities. Patient will be independent and compliant with HEP daily to achieve above goals. Patient Education:   [x] HEP/Education Completed: continue HEP   Medbridge Access Code:  []  No new Education completed  []  Reviewed Prior HEP      [x]  Patient verbalized and/or demonstrated understanding of education provided. []  Patient unable to verbalize and/or demonstrate understanding of education provided. Will continue education. []  Barriers to learning:     PLAN:  Treatment Recommendations: Strengthening, Range of Motion, Manual Therapy - Soft Tissue Mobilization, Home Exercise Program, Patient Education, Aquatics and Modalities    []  Plan of care initiated. Plan to see patient 2 times per week for 8 weeks to address the treatment planned outlined above.   [x]  Continue with current plan of care  []  Modify plan of care as follows:    []  Hold pending physician visit  []  Discharge    Time In 0911   Time Out 0932   Timed Code Minutes: 21 min   Total Treatment Time: 21 min       Electronically Signed by: Lois Spatz, PT

## 2021-11-02 ENCOUNTER — HOSPITAL ENCOUNTER (OUTPATIENT)
Dept: PHYSICAL THERAPY | Age: 56
Setting detail: THERAPIES SERIES
Discharge: HOME | End: 2021-11-02
Payer: COMMERCIAL

## 2021-11-02 PROCEDURE — 97035 APP MDLTY 1+ULTRASOUND EA 15: CPT

## 2021-11-02 PROCEDURE — 97110 THERAPEUTIC EXERCISES: CPT

## 2021-11-02 NOTE — PROGRESS NOTES
[facility]  PHYSICAL THERAPY  [x] DAILY NOTE (LAND) [] DAILY NOTE (AQUATIC ) [] PROGRESS NOTE [] DISCHARGE NOTE    [x] [facility] [place]   [] [place] [address]    [] [facility]   [] Charis Sherwood    Date: 2021  Patient Name:  Moises Pro  : 1965  MRN: [id number]  CSN: [id number]    Referring Practitioner OPAL Morales -*   Diagnosis Radiculopathy, cervical region [M54.12]    Treatment Diagnosis Cervical radiculopathy, poor posture, tight postural muscles, postural weakness   Date of Evaluation 10/13/21    Additional Pertinent History Arthritis in knees (needs TKA), had partial without relief,  UTI, kidney stones. Carpal tunnel release, right ; Functional Outcome Measure Used NDI   Functional Outcome Score 18 (10/13/21)       Insurance: Primary: Payor: [address] [place] [address]  [phone] /  /  / ,   Secondary:    Authorization Information: PRECERTIFICATION REQUIRED: No  INSURANCE THERAPY BENEFIT: No pre-certification is needed. PT, OT and ST are allowed 30 visits each per calendar year. AQUATIC THERAPY COVERED:  Yes   MODALITIES COVERED:  Yes--Iontophoresis is not covered. Hot/Cold Packs are not covered. TELEHEALTH COVERED:  Yes   Visit # 6, 10 for progress note   Visits Allowed: 30   Recertification Date:    Physician Follow-Up: 10/19/21   Physician Orders:    History of Present Illness: Pt presents with N/T down entire right arm to finger tips for past 2 years. Carpal tunnel surgery didn't help. EMG completed and showed C5-6 radiculopathy. Pt denies neck pain. SUBJECTIVE: Patient reports right arm continues to feel the same. Pt does note that towel roll in base of pillow helps her sleep supine.      Objective  TREATMENT   Precautions:    Pain: 8-9/10 right arm N/T    X in shaded column indicates activity completed today   Modalities Parameters/  Location  Notes   Ultrasound estim combo to right UT 1MHz, cont, 1/0 w/cm2 x8 minutes, premod intensity 8 x No immediate change in right arm N/T               Manual Therapy Time/Technique  Notes   STM with Hawk  #8 to right/left upper traps  15 minutes total  Redness noted following with slight petechiae               Exercise/Intervention   Notes   Supine cervical retraction 10   No change               Cervical retraction with upper cervical flexion 10   No change                Cervical retraction with extension 10   No change          Backward shoulder rolls 10  x    Scapular retractions 10   x cues for upper trap relaxation   Loaded cervical retractions 10  x Cues for posture   Doorway/pec stretch- low and mid 3x20 sec  x    Upper trap stretch- B 3x20 sec  x    Median nerve glide RUE 5x5 sec  x                    Specific Interventions Next Treatment: manual, modalities, postural strengthening, gentle ROM/stretching, stabilization as tolerated    Activity/Treatment Tolerance:  [x]  Patient tolerated treatment well  []  Patient limited by fatigue  [x]  Patient limited by pain   []  Patient limited by medical complications  []  Other:     Assessment: Continued to ultrasound/estim combo with no change in RUE symptoms. Introduced upper trap stretch and median nerve glide which aggravated symptoms. Attempted levator stretch but no tension noted. Poor posture noted with forward head and rounded shoulders. GOALS:  Patient Goal: Reduce right UE N/T    Short Term Goals: defer to LTG's    Long Term Goals: 8 weeks  Patient will improve AROM of cervical lateral flexion from 20 to 30 degrees to decrease tightness for less RUE N/T to improve sensation. Patient will demonstrate good postural awareness without cues during therapy session to carry over to sitting and standing posture with IADL's and work duties. Patient will improve shoulder and triceps strength to 4+/5 and right  strength to 30# to reduce dropping objects.     Patient will report reduced pain level to <5/10 at worst to tolerate daily activities. Patient will be independent and compliant with HEP daily to achieve above goals. Patient Education:   [] HEP/Education Completed: continue HEP   [employer] Access Code:  []  No new Education completed  [x]  Reviewed Prior HEP      [x]  Patient verbalized and/or demonstrated understanding of education provided. []  Patient unable to verbalize and/or demonstrate understanding of education provided. Will continue education. []  Barriers to learning:     PLAN:  Treatment Recommendations: Strengthening, Range of Motion, Manual Therapy - Soft Tissue Mobilization, Home Exercise Program, Patient Education, Aquatics and Modalities    []  Plan of care initiated. Plan to see patient 2 times per week for 8 weeks to address the treatment planned outlined above.   [x]  Continue with current plan of care  []  Modify plan of care as follows:    []  Hold pending physician visit  []  Discharge    Time In 0945   Time Out 1012   Timed Code Minutes: 27 min   Total Treatment Time: 27 min       Electronically Signed by: Tamia Parrish PT

## 2021-11-04 ENCOUNTER — APPOINTMENT (OUTPATIENT)
Dept: PHYSICAL THERAPY | Age: 56
End: 2021-11-04
Payer: COMMERCIAL

## 2021-11-09 ENCOUNTER — HOSPITAL ENCOUNTER (OUTPATIENT)
Dept: PHYSICAL THERAPY | Age: 56
Setting detail: THERAPIES SERIES
Discharge: HOME | End: 2021-11-09
Payer: COMMERCIAL

## 2021-11-09 ENCOUNTER — TELEPHONE (OUTPATIENT)
Dept: FAMILY MEDICINE CLINIC | Age: 56
End: 2021-11-09

## 2021-11-09 ENCOUNTER — HOSPITAL ENCOUNTER (OUTPATIENT)
Age: 56
Discharge: HOME | End: 2021-11-09
Payer: COMMERCIAL

## 2021-11-09 DIAGNOSIS — N39.0 ACUTE URINARY TRACT INFECTION: ICD-10-CM

## 2021-11-09 DIAGNOSIS — M54.12 CERVICAL RADICULOPATHY: Primary | ICD-10-CM

## 2021-11-09 PROCEDURE — 87186 SC STD MICRODIL/AGAR DIL: CPT

## 2021-11-09 PROCEDURE — 97035 APP MDLTY 1+ULTRASOUND EA 15: CPT

## 2021-11-09 PROCEDURE — 87077 CULTURE AEROBIC IDENTIFY: CPT

## 2021-11-09 PROCEDURE — 97110 THERAPEUTIC EXERCISES: CPT

## 2021-11-09 PROCEDURE — 87086 URINE CULTURE/COLONY COUNT: CPT

## 2021-11-09 NOTE — TELEPHONE ENCOUNTER
----- Message from Lalo Cesar sent at 11/9/2021  8:06 AM EST -----  Subject: Message to Provider    QUESTIONS  Information for Provider? Patient would liker her order for urine culture   sent to [facility] on [address]  ---------------------------------------------------------------------------  --------------  [address]  What is the best way for the office to contact you? OK to leave message on   voicemail  Preferred Call Back Phone Number? [phone]  ---------------------------------------------------------------------------  --------------  SCRIPT ANSWERS  Relationship to Patient?  Self

## 2021-11-09 NOTE — DISCHARGE SUMMARY
[address] [facility]  PHYSICAL THERAPY  [] DAILY NOTE (LAND) [] DAILY NOTE (AQUATIC ) [] PROGRESS NOTE [x] DISCHARGE NOTE    [x] [facility] [place]   [] [place] [address]    [] [facility]   [] Allen Leggettar    Date: 2021  Patient Name:  Cortney Huertas  : 1965  MRN: [id number]  CSN: [id number]    Referring Practitioner OPAL Wilder -*   Diagnosis Radiculopathy, cervical region [M54.12]    Treatment Diagnosis Cervical radiculopathy, poor posture, tight postural muscles, postural weakness   Date of Evaluation 10/13/21    Additional Pertinent History Arthritis in knees (needs TKA), had partial without relief,  UTI, kidney stones. Carpal tunnel release, right ; Functional Outcome Measure Used NDI   Functional Outcome Score 18 (10/13/21) 17 (21)      Insurance: Primary: Payor: [address] [place] [address]  [phone] /  /  / ,   Secondary:    Authorization Information: PRECERTIFICATION REQUIRED: No  INSURANCE THERAPY BENEFIT: No pre-certification is needed. PT, OT and ST are allowed 30 visits each per calendar year. AQUATIC THERAPY COVERED:  Yes   MODALITIES COVERED:  Yes--Iontophoresis is not covered. Hot/Cold Packs are not covered. TELEHEALTH COVERED:  Yes   Visit # 7, 10 for progress note   Visits Allowed: 30   Recertification Date:    Physician Follow-Up: 10/19/21   Physician Orders:    History of Present Illness: Pt presents with N/T down entire right arm to finger tips for past 2 years. Carpal tunnel surgery didn't help. EMG completed and showed C5-6 radiculopathy. Pt denies neck pain. SUBJECTIVE: Patient reports no change in RUE N/T since starting therapy. Pt doesn't get any relief from treatments or HEP.      Objective  TREATMENT   Precautions:    Pain: 8-9/10 right arm N/T    X in shaded column indicates activity completed today   Modalities Parameters/  Location  Notes   Ultrasound estim combo to right UT 1MHz, cont, 1/0 w/cm2 x8 minutes, premod intensity 7 x                Manual Therapy Time/Technique  Notes   STM with Hawk  #8 to right/left upper traps  15 minutes total  Redness noted following with slight petechiae               Exercise/Intervention   Notes   Supine cervical retraction 10   No change               Cervical retraction with upper cervical flexion 10   No change                Cervical retraction with extension 10   No change          Backward shoulder rolls 15  x    Scapular retractions 15x5 sec   x cues for upper trap relaxation   Loaded cervical retractions 10x5 sec  x Cues for posture   Doorway/pec stretch- low and mid 3x20 sec  x    Upper trap stretch- B 3x20 sec  x    Median nerve glide RUE 5x5 sec  x                    Specific Interventions Next Treatment: manual, modalities, postural strengthening, gentle ROM/stretching, stabilization as tolerated    Activity/Treatment Tolerance:  [x]  Patient tolerated treatment well  []  Patient limited by fatigue  [x]  Patient limited by pain   []  Patient limited by medical complications  []  Other:     Assessment: Pt demos minimal progress toward goals since starting therapy. RUE N/T remains the same, rating it 8-9/10, and unable to change with modalities, repetitive motion test, manual, and exercise. No change noted in right cervical lateral flexion or R shoulder/tricep strength;  strength has weakened by 5#. Pt continues to demo poor posture with decreased awareness. Recommend patient to follow up with doctor regarding continued symptoms and for further testing, since PT unsuccessful. GOALS:  Patient Goal: Reduce right UE N/T    Short Term Goals: defer to LTG's    Long Term Goals: 8 weeks  Patient will improve AROM of cervical lateral flexion from 20 to 30 degrees to decrease tightness for less RUE N/T to improve sensation. GOAL NOT MET: lateral flexion right 20 deg, left 28 deg, and no change in RUE N/T.   Discontinue Goal  Patient will demonstrate good postural awareness without cues during therapy session to carry over to sitting and standing posture with IADL's and work duties. GOAL NOT MET: Pt continues to demo forward head and rounded shoulder posture when sitting during exercises. Discontinue Goal  Patient will improve shoulder and triceps strength to 4+/5 and right  strength to 30# to reduce dropping objects. GOAL NOT MET: shoulder flexion, abduction 4/5 with pain on right, IR/ER 4-/5 with pain on right, tricep 3+/5,  25#. Discontinue Goal  Patient will report reduced pain level to <5/10 at worst to tolerate daily activities. GOAL NOT MET: Pt continues to have RUE N/T rating 8-9/10. Discontinue Goal  Patient will be independent and compliant with HEP daily to achieve above goals. GOAL MET:  Pt verbalizes compliance with HEP daily. Discontinue Goal        Patient Education:   [] HEP/Education Completed: continue HEP   [employer] Access Code:  []  No new Education completed  [x]  Reviewed Prior HEP      [x]  Patient verbalized and/or demonstrated understanding of education provided. []  Patient unable to verbalize and/or demonstrate understanding of education provided. Will continue education. []  Barriers to learning:     PLAN:  Treatment Recommendations: Strengthening, Range of Motion, Manual Therapy - Soft Tissue Mobilization, Home Exercise Program, Patient Education, Aquatics and Modalities    []  Plan of care initiated. Plan to see patient 2 times per week for 8 weeks to address the treatment planned outlined above.   []  Continue with current plan of care  []  Modify plan of care as follows:    []  Hold pending physician visit  [x]  Discharge    Time In 0935   Time Out 1010   Timed Code Minutes: 35 min   Total Treatment Time: 35 min       Electronically Signed by: Shasta Vidales, PT

## 2021-11-11 ENCOUNTER — TELEPHONE (OUTPATIENT)
Dept: FAMILY MEDICINE CLINIC | Age: 56
End: 2021-11-11

## 2021-11-11 DIAGNOSIS — N39.0 ACUTE URINARY TRACT INFECTION: Primary | ICD-10-CM

## 2021-11-11 LAB
ORGANISM: ABNORMAL
URINE CULTURE, ROUTINE: ABNORMAL

## 2021-11-11 RX ORDER — LEVOFLOXACIN 500 MG/1
500 TABLET, FILM COATED ORAL DAILY
Qty: 7 TABLET | Refills: 0 | Status: SHIPPED | OUTPATIENT
Start: 2021-11-11 | End: 2021-11-18

## 2021-11-11 NOTE — TELEPHONE ENCOUNTER
----- Message from Jina Doherty, APRN - CNP sent at 11/11/2021  8:20 AM EST -----  Let Lobito Alvarez know her urine culture is still growing bacteria, but it's a different bacteria than the one before. I am sending in an antibiotic. I would strongly rec'd she schedule a f/u appt with her urologist at [facility].

## 2021-11-16 ENCOUNTER — HOSPITAL ENCOUNTER (OUTPATIENT)
Dept: MRI IMAGING | Age: 56
Discharge: HOME | End: 2021-11-16
Payer: COMMERCIAL

## 2021-11-16 DIAGNOSIS — M54.12 CERVICAL RADICULOPATHY: ICD-10-CM

## 2021-11-16 PROCEDURE — 72141 MRI NECK SPINE W/O DYE: CPT

## 2021-11-17 ENCOUNTER — TELEPHONE (OUTPATIENT)
Dept: FAMILY MEDICINE CLINIC | Age: 56
End: 2021-11-17

## 2021-11-17 DIAGNOSIS — M48.02 DEGENERATIVE CERVICAL SPINAL STENOSIS: ICD-10-CM

## 2021-11-17 DIAGNOSIS — G95.89 MYELOMALACIA OF CERVICAL CORD (HCC): Primary | ICD-10-CM

## 2021-11-17 NOTE — TELEPHONE ENCOUNTER
----- Message from OPAL Hdz CNP sent at 11/17/2021  9:02 AM EST -----  Let Cornelio Velazquez know her MRI of her neck shows some moderate to severe cervical spinal stenosis or narrowing pretty much throughout her entire neck. The narrowing is actually causing the spinal cord to flatten and soften in a couple different areas of her neck. I would start with her seeing orthopedic to see what they recommend. Referral placed to [facility].

## 2021-12-13 ENCOUNTER — OFFICE VISIT (OUTPATIENT)
Dept: UROLOGY | Age: 56
End: 2021-12-13
Payer: COMMERCIAL

## 2021-12-13 ENCOUNTER — TELEPHONE (OUTPATIENT)
Dept: UROLOGY | Age: 56
End: 2021-12-13

## 2021-12-13 VITALS
BODY MASS INDEX: 35.08 KG/M2 | HEIGHT: 63 IN | DIASTOLIC BLOOD PRESSURE: 74 MMHG | WEIGHT: 198 LBS | SYSTOLIC BLOOD PRESSURE: 114 MMHG

## 2021-12-13 DIAGNOSIS — R35.0 URINARY FREQUENCY: Primary | ICD-10-CM

## 2021-12-13 DIAGNOSIS — N39.0 RECURRENT UTI: ICD-10-CM

## 2021-12-13 LAB
BILIRUBIN URINE: NEGATIVE
BLOOD URINE, POC: ABNORMAL
CHARACTER, URINE: CLEAR
COLOR, URINE: YELLOW
GLUCOSE URINE: NEGATIVE MG/DL
KETONES, URINE: ABNORMAL
LEUKOCYTE CLUMPS, URINE: ABNORMAL
NITRITE, URINE: NEGATIVE
PH, URINE: 5.5 (ref 5–9)
POST VOID RESIDUAL (PVR): 85 ML
PROTEIN, URINE: NEGATIVE MG/DL
SPECIFIC GRAVITY, URINE: >= 1.03 (ref 1–1.03)
UROBILINOGEN, URINE: 0.2 EU/DL (ref 0–1)

## 2021-12-13 PROCEDURE — 1036F TOBACCO NON-USER: CPT | Performed by: UROLOGY

## 2021-12-13 PROCEDURE — 81003 URINALYSIS AUTO W/O SCOPE: CPT | Performed by: UROLOGY

## 2021-12-13 PROCEDURE — G8484 FLU IMMUNIZE NO ADMIN: HCPCS | Performed by: UROLOGY

## 2021-12-13 PROCEDURE — G8427 DOCREV CUR MEDS BY ELIG CLIN: HCPCS | Performed by: UROLOGY

## 2021-12-13 PROCEDURE — 99214 OFFICE O/P EST MOD 30 MIN: CPT | Performed by: UROLOGY

## 2021-12-13 PROCEDURE — G8417 CALC BMI ABV UP PARAM F/U: HCPCS | Performed by: UROLOGY

## 2021-12-13 PROCEDURE — 51798 US URINE CAPACITY MEASURE: CPT | Performed by: UROLOGY

## 2021-12-13 PROCEDURE — 3017F COLORECTAL CA SCREEN DOC REV: CPT | Performed by: UROLOGY

## 2021-12-13 RX ORDER — GREEN TEA/HOODIA GORDONII 315-12.5MG
1 CAPSULE ORAL DAILY
Qty: 30 TABLET | Refills: 6 | Status: SHIPPED | OUTPATIENT
Start: 2021-12-13 | End: 2022-01-12

## 2021-12-13 RX ORDER — PYRIDOXINE HCL (VITAMIN B6) 100 MG
500 TABLET ORAL DAILY
Qty: 30 CAPSULE | Refills: 6 | Status: SHIPPED | OUTPATIENT
Start: 2021-12-13 | End: 2022-01-20 | Stop reason: DRUGHIGH

## 2021-12-13 NOTE — TELEPHONE ENCOUNTER
Patient scheduled for US RENAL COMPLETE AND US URINARY BLADDER  at [facility] MR on 1/17/22 ARRIVAL OF 8:45 AM FOR A 9  SCAN TIME WITH NO CARBONATED BEVERAGES THE DAY OF AND ARRIVE WELL HYDRATED. Patient advised of instructions.   Order mailed/given to patient

## 2021-12-13 NOTE — PROGRESS NOTES
[facility] / [facility] Patient Visit    Patient:  Emelina You  YOB: 1965  Date: 12/13/2021  Consult requested from OPAL Daniels CNP     HISTORY OF PRESENT ILLNESS:   The patient is a 60 y.o. female who presents today for follow-up for the following problem(s): kidney stone  Overall the problem(s) : are worsening. Associated Symptoms: No dysuria, gross hematuria. Pain Severity:      Today visit:   12/13/21   Follows up for recent recurrent UTIs. Enterobacter and klebsiella (x 2). She would like improvement. She also has Nocturia, but driven by drinking tea after dinner. HPI Stone  Ms Brutus Dakin presents with worsening right flank pain that started 1-2 weeks ago. She has history of stones. She states the pain radiates to her right groin. Pain is a 10/10 now. She has a renal ultrasound the demonstrates right hydronephrosis. Afebrile. She has a UTI (10/20), currently on Omnicef. Labs reviewed  Old records reviewed  Problem: worsening  Duration: 1-2 weeks  The patient does have history of kidney stones in the past.     history: stones   FH: none    Summary of old records:   (Patient's old records, notes and chart reviewed and summarized above.)  Emelina You is a 62 y.o. with past medical history of HTN, GERD who follows up today to review CT Urogram.     She was last seen in November in follow up from Urgent Care. Presented to Urgent Carewith c/o urinary frequency, dysuria, and pelvic pressure. Urine there showed moderate blood and leuks, negative for nitrites. She was discharged home with Bactrim. In follow up, she reported the frequency and urgency continued. Also having some urge incontinence. Had a couple of UTIs in the past month. Significant hx of kidney stones. No imaging since 2017. She's wasn't on any preventative medications for UTIs. Said she wasn't drinking much water.   She was told to increase her water intake, started on Pyridium and Toradol, and a CT Urogram was ordered.     CT Urogram revealed no obstructive uropathy, however, did show a slightly lobulated contour to the right kidney which could be from inflammation or infection. Christen reports her right flank pain persists. She is no longer taking the Toradol and Pyridium, she is out. Urine today was negative for infection.      Christen Parker comes in alone. History is obtained from patient and medical record.     Urinalysis today:  Results for POC orders placed in visit on 12/13/21   POCT Urinalysis No Micro (Auto)   Result Value Ref Range    Glucose, Ur Negative NEGATIVE mg/dl    Bilirubin Urine Negative     Ketones, Urine Trace (A) NEGATIVE    Specific Gravity, Urine >= 1.030 1.002 - 1.030    Blood, UA POC Trace-intact NEGATIVE    pH, Urine 5.50 5.0 - 9.0    Protein, Urine Negative NEGATIVE mg/dl    Urobilinogen, Urine 0.20 0.0 - 1.0 eu/dl    Nitrite, Urine Negative NEGATIVE    Leukocyte Clumps, Urine Moderate (A) NEGATIVE    Color, Urine Yellow YELLOW-STRAW    Character, Urine Clear [id number].[id number]   poct post void residual   Result Value Ref Range    post void residual 85 ml       Last BUN and creatinine:  Lab Results   Component Value Date    BUN 10 09/28/2021     Lab Results   Component Value Date    CREATININE 0.9 09/28/2021       Imaging Reviewed during this Office Visit:   (results were independently reviewed by physician and radiology report verified)    PAST MEDICAL, FAMILY AND SOCIAL HISTORY:  Past Medical History:   Diagnosis Date    Depression     KATI (generalized anxiety disorder)     GERD (gastroesophageal reflux disease)     Hypertension     Kidney stone     Prediabetes 10/18/2019     Past Surgical History:   Procedure Laterality Date    CARPAL TUNNEL RELEASE Right 2020    CHOLECYSTECTOMY      COLONOSCOPY      CYSTO/URETERO/PYELOSCOPY, CALCULUS TX N/A 08/04/2017    CYSTOSCOPY, RIGHT URETEROSCOPY, BALLOON DILATION, STENT INSERTION performed by Martinez Diaz MD at [address]      KIDNEY STONE SURGERY  2015    Right percutaneous nephrolithotomy     KNEE SURGERY Right 2008    partial knee replacement    DC OFFICE/OUTPT VISIT,PROCEDURE ONLY Left 2018    LEFT WRIST OPEN REDUCTION INTERNAL FIXATION performed by Arjun Goodrich MD at [address] [place] [address] History   Problem Relation Age of Onset    Diabetes Mother     High Blood Pressure Mother     Cancer Mother     Kidney Disease Mother     Mental Illness Father      Outpatient Medications Marked as Taking for the 21 encounter (Office Visit) with Lenny Montelongo MD   Medication Sig Dispense Refill    loperamide (IMODIUM) 2 MG capsule Take 1 capsule by mouth 4 times daily as needed for Diarrhea 90 capsule 1    tolterodine (DETROL LA) 4 MG extended release capsule Take 1 capsule by mouth daily 30 capsule 5       Cipro iv [ciprofloxacin]  Social History     Tobacco Use   Smoking Status Former Smoker    Packs/day: 0.50    Years: 20.00    Pack years: 10.00    Quit date: 1993    Years since quittin.3   Smokeless Tobacco Never Used       Social History     Substance and Sexual Activity   Alcohol Use Yes    Comment: socially       REVIEW OF SYSTEMS:  Constitutional: negative  Eyes: negative  Respiratory: negative  Cardiovascular: negative  Gastrointestinal: negative  Genitourinary: negative  Musculoskeletal: negative  Skin: negative   Neurological: negative  Hematological/Lymphatic: negative  Psychological: negative    Physical Exam:    This a 60 y.o. female      Vitals:    21 1406   BP: 114/74     Constitutional: Patient in no acute distress   Neuro: alert and oriented to person place and time.     Psych: Mood and affect normal.  Head: atraumatic normocephalic  Eyes: EOMi  HEENT: neck supple, trachea midline  Lungs: Respiratory effort normal  Cardiovascular:  Normal peripheral pulses  Abdomen: Soft, non-tender, non-distended, No CVA  Bladder: non-tender and not distended. FROMx4, no cyanosis clubbing edema  Skin: warm and dry     Assessment and Plan      1. Urinary frequency    2. Recurrent UTI           Plan:      No follow-ups on file. Recurrent UTI  - Cranberry extract, D-mannose, probiotic  RBUS  Urinary frequency urgency and incontinence  - avoid bladder irritants.   - Continue Detrol LA - 4 mg - not controlling  - will trial Myrbetriq 25 mg   Timed voiding

## 2021-12-15 LAB
ORGANISM: ABNORMAL
URINE CULTURE, ROUTINE: ABNORMAL

## 2022-01-07 ENCOUNTER — HOSPITAL ENCOUNTER (OUTPATIENT)
Dept: GENERAL RADIOLOGY | Age: 57
Discharge: HOME | End: 2022-01-07
Payer: COMMERCIAL

## 2022-01-07 ENCOUNTER — HOSPITAL ENCOUNTER (OUTPATIENT)
Age: 57
Discharge: HOME | End: 2022-01-07
Payer: COMMERCIAL

## 2022-01-07 DIAGNOSIS — Z01.811 ENCOUNTER FOR PREOPERATIVE PULMONARY EXAMINATION: ICD-10-CM

## 2022-01-07 LAB
ALBUMIN SERPL-MCNC: 4.4 G/DL (ref 3.5–5.1)
ANION GAP SERPL CALCULATED.3IONS-SCNC: 14 MEQ/L (ref 8–16)
BASOPHILS # BLD: 1 %
BASOPHILS ABSOLUTE: 0.1 THOU/MM3 (ref 0–0.1)
BUN BLDV-MCNC: 12 MG/DL (ref 7–22)
CALCIUM SERPL-MCNC: 9.4 MG/DL (ref 8.5–10.5)
CHLORIDE BLD-SCNC: 101 MEQ/L (ref 98–111)
CO2: 22 MEQ/L (ref 23–33)
CREAT SERPL-MCNC: 1 MG/DL (ref 0.4–1.2)
EOSINOPHIL # BLD: 3.1 %
EOSINOPHILS ABSOLUTE: 0.2 THOU/MM3 (ref 0–0.4)
ERYTHROCYTE [DISTWIDTH] IN BLOOD BY AUTOMATED COUNT: 11.6 % (ref 11.5–14.5)
ERYTHROCYTE [DISTWIDTH] IN BLOOD BY AUTOMATED COUNT: 40.2 FL (ref 35–45)
GFR SERPL CREATININE-BSD FRML MDRD: 57 ML/MIN/1.73M2
GLUCOSE BLD-MCNC: 122 MG/DL (ref 70–108)
HCT VFR BLD CALC: 46 % (ref 37–47)
HEMOGLOBIN: 16 GM/DL (ref 12–16)
IMMATURE GRANS (ABS): 0.02 THOU/MM3 (ref 0–0.07)
IMMATURE GRANULOCYTES: 0.3 %
LYMPHOCYTES # BLD: 20.6 %
LYMPHOCYTES ABSOLUTE: 1.3 THOU/MM3 (ref 1–4.8)
MCH RBC QN AUTO: 33.1 PG (ref 26–33)
MCHC RBC AUTO-ENTMCNC: 34.8 GM/DL (ref 32.2–35.5)
MCV RBC AUTO: 95 FL (ref 81–99)
MONOCYTES # BLD: 11.3 %
MONOCYTES ABSOLUTE: 0.7 THOU/MM3 (ref 0.4–1.3)
MRSA NASAL SCREEN RT-PCR: NEGATIVE
NUCLEATED RED BLOOD CELLS: 0 /100 WBC
PLATELET # BLD: 189 THOU/MM3 (ref 130–400)
PMV BLD AUTO: 10.3 FL (ref 9.4–12.4)
POTASSIUM SERPL-SCNC: 4 MEQ/L (ref 3.5–5.2)
PREALBUMIN: 18.4 MG/DL (ref 20–40)
RBC # BLD: 4.84 MILL/MM3 (ref 4.2–5.4)
SEG NEUTROPHILS: 63.7 %
SEGMENTED NEUTROPHILS ABSOLUTE COUNT: 3.9 THOU/MM3 (ref 1.8–7.7)
SODIUM BLD-SCNC: 137 MEQ/L (ref 135–145)
STAPH AUREUS SCREEN RT-PCR: NEGATIVE
WBC # BLD: 6.1 THOU/MM3 (ref 4.8–10.8)

## 2022-01-07 PROCEDURE — 82040 ASSAY OF SERUM ALBUMIN: CPT

## 2022-01-07 PROCEDURE — 85025 COMPLETE CBC W/AUTO DIFF WBC: CPT

## 2022-01-07 PROCEDURE — 87640 STAPH A DNA AMP PROBE: CPT

## 2022-01-07 PROCEDURE — 80048 BASIC METABOLIC PNL TOTAL CA: CPT

## 2022-01-07 PROCEDURE — 87641 MR-STAPH DNA AMP PROBE: CPT

## 2022-01-07 PROCEDURE — 84134 ASSAY OF PREALBUMIN: CPT

## 2022-01-07 PROCEDURE — 36415 COLL VENOUS BLD VENIPUNCTURE: CPT

## 2022-01-07 PROCEDURE — 93005 ELECTROCARDIOGRAM TRACING: CPT | Performed by: ORTHOPAEDIC SURGERY

## 2022-01-07 PROCEDURE — 71046 X-RAY EXAM CHEST 2 VIEWS: CPT

## 2022-01-09 LAB
EKG ATRIAL RATE: 63 BPM
EKG P AXIS: 40 DEGREES
EKG P-R INTERVAL: 184 MS
EKG Q-T INTERVAL: 420 MS
EKG QRS DURATION: 90 MS
EKG QTC CALCULATION (BAZETT): 429 MS
EKG R AXIS: 4 DEGREES
EKG T AXIS: 34 DEGREES
EKG VENTRICULAR RATE: 63 BPM

## 2022-01-09 PROCEDURE — 93010 ELECTROCARDIOGRAM REPORT: CPT | Performed by: INTERNAL MEDICINE

## 2022-01-10 ENCOUNTER — OFFICE VISIT (OUTPATIENT)
Dept: FAMILY MEDICINE CLINIC | Age: 57
End: 2022-01-10
Payer: COMMERCIAL

## 2022-01-10 VITALS
OXYGEN SATURATION: 100 % | WEIGHT: 194.8 LBS | SYSTOLIC BLOOD PRESSURE: 138 MMHG | HEIGHT: 63 IN | HEART RATE: 63 BPM | BODY MASS INDEX: 34.52 KG/M2 | TEMPERATURE: 98.9 F | DIASTOLIC BLOOD PRESSURE: 84 MMHG | RESPIRATION RATE: 12 BRPM

## 2022-01-10 DIAGNOSIS — U07.1 COVID-19 VIRUS INFECTION: ICD-10-CM

## 2022-01-10 DIAGNOSIS — M48.02 DEGENERATIVE CERVICAL SPINAL STENOSIS: Primary | ICD-10-CM

## 2022-01-10 DIAGNOSIS — G95.89 MYELOMALACIA OF CERVICAL CORD (HCC): ICD-10-CM

## 2022-01-10 LAB
INFLUENZA VIRUS A RNA: NEGATIVE
INFLUENZA VIRUS B RNA: NEGATIVE
Lab: ABNORMAL
QC PASS/FAIL: ABNORMAL
SARS-COV-2 RDRP RESP QL NAA+PROBE: POSITIVE

## 2022-01-10 PROCEDURE — 87635 SARS-COV-2 COVID-19 AMP PRB: CPT | Performed by: NURSE PRACTITIONER

## 2022-01-10 PROCEDURE — G8427 DOCREV CUR MEDS BY ELIG CLIN: HCPCS | Performed by: NURSE PRACTITIONER

## 2022-01-10 PROCEDURE — G8417 CALC BMI ABV UP PARAM F/U: HCPCS | Performed by: NURSE PRACTITIONER

## 2022-01-10 PROCEDURE — G8484 FLU IMMUNIZE NO ADMIN: HCPCS | Performed by: NURSE PRACTITIONER

## 2022-01-10 PROCEDURE — 99242 OFF/OP CONSLTJ NEW/EST SF 20: CPT | Performed by: NURSE PRACTITIONER

## 2022-01-10 PROCEDURE — 87502 INFLUENZA DNA AMP PROBE: CPT | Performed by: NURSE PRACTITIONER

## 2022-01-10 RX ORDER — MULTIVIT-MIN/IRON/FOLIC ACID/K 18-600-40
1 CAPSULE ORAL DAILY
Qty: 30 CAPSULE | Refills: 0 | Status: SHIPPED | OUTPATIENT
Start: 2022-01-10

## 2022-01-10 SDOH — ECONOMIC STABILITY: FOOD INSECURITY: WITHIN THE PAST 12 MONTHS, THE FOOD YOU BOUGHT JUST DIDN'T LAST AND YOU DIDN'T HAVE MONEY TO GET MORE.: NEVER TRUE

## 2022-01-10 SDOH — ECONOMIC STABILITY: FOOD INSECURITY: WITHIN THE PAST 12 MONTHS, YOU WORRIED THAT YOUR FOOD WOULD RUN OUT BEFORE YOU GOT MONEY TO BUY MORE.: NEVER TRUE

## 2022-01-10 ASSESSMENT — PATIENT HEALTH QUESTIONNAIRE - PHQ9
1. LITTLE INTEREST OR PLEASURE IN DOING THINGS: 0
SUM OF ALL RESPONSES TO PHQ QUESTIONS 1-9: 0
SUM OF ALL RESPONSES TO PHQ9 QUESTIONS 1 & 2: 0
2. FEELING DOWN, DEPRESSED OR HOPELESS: 0

## 2022-01-10 ASSESSMENT — SOCIAL DETERMINANTS OF HEALTH (SDOH): HOW HARD IS IT FOR YOU TO PAY FOR THE VERY BASICS LIKE FOOD, HOUSING, MEDICAL CARE, AND HEATING?: NOT HARD AT ALL

## 2022-01-10 NOTE — LETTER
[address] [facility] [address]  Phone: [phone]  Fax: [phone]    OPAL Nunes CNP        January 10, 2022     Patient: Aidan Stein   YOB: 1965   Date of Visit: 1/10/2022       To Whom It May Concern: It is my medical opinion that Isai Velázquez may return to work on 1/13/22. She tested positive for COVID and is to quarantine until then. If you have any questions or concerns, please don't hesitate to call.     Sincerely,        OPAL Nunes CNP

## 2022-01-10 NOTE — PROGRESS NOTES
Valarie Maddox is a 60 y.o. female that presents for Pre-op Exam and Generalized Body Aches (Started saturday )        At the request of Dr. Ayse Hobbs presents for pre-operative evaluation for her surgery. Planned Surgery: ACDF C3-C7 with seaspine    She started feeling sick on Saturday afternoon. Had HA and body aches, still feeling weak, but improving some    Patient Active Problem List    Diagnosis Date Noted    Cervical radiculopathy 10/19/2021    Irritable bowel syndrome with diarrhea 09/28/2021    Primary osteoarthritis of both knees 02/10/2020    Prediabetes 10/18/2019    Ureteral stricture     Recurrent UTI 08/31/2016    KATI (generalized anxiety disorder) 08/03/2016    Moderate episode of recurrent major depressive disorder ([facility] [address] 08/03/2016    History of lithotripsy     Renal calculus     ANKUR (stress urinary incontinence, female) 04/28/2015       PREOPERATIVE FAMILY HISTORY  - She reports that she does not have a history of bad reaction to anesthesia.  -she does not have a family history of bleeding problems such as hemophilia, or Jayson disease, or von Willebrand disease.     PREOPERATIVE ALLERGIES QUESTION:  Allergies   Allergen Reactions    Cipro Iv [Ciprofloxacin] Hives         PREOPERATIVE MEDICATION QUESTION:  Current Outpatient Medications   Medication Sig Dispense Refill    vitamin D-3 (CHOLECALCIFEROL) 125 MCG (5000 UT) TABS Take 1 tablet by mouth daily 30 tablet 0    Ascorbic Acid (VITAMIN C) 500 MG CAPS Take 1 capsule by mouth daily 30 capsule 0    zinc 50 MG CAPS Take 50 mg by mouth daily 30 capsule 0    Cranberry 500 MG CAPS Take 1 capsule by mouth daily 30 capsule 6    D-Mannose 500 MG CAPS Take 1 tablet by mouth daily 30 capsule 6    Probiotic Acidophilus (FLORANEX) TABS Take 1 tablet by mouth daily 30 tablet 6    loperamide (IMODIUM) 2 MG capsule Take 1 capsule by mouth 4 times daily as needed for Diarrhea 90 capsule 1    tolterodine (DETROL LA) 4 MG extended release capsule Take 1 capsule by mouth daily 30 capsule 5     No current facility-administered medications for this visit. CARDIAC RISK FACTORS  she does not have a history of UA or MI within that past 30 days  she does not have decompensated CHF or significant valvular disease  she does not have a history of significant cardiac arrhythmia  she denies chest pain, exertional dyspnea, orthopnea, palpitations or LE edema. she can walk up 1 flight of stairs or 8 steps without stopping (4 METS)    she does not have a history of CAD  she does not have a history of CHF  she does not have a history of CVA or TIA  she does not have a history of DM requiring insulin  she does not have a history of Renal insufficiency (Cr > 2.0)    EKG reveals sinus rhythm with a rate of 63. There are not Q waves and are not ST segment changes. http://circ. ahajournals. org/content/116/17/e418/F2. large.jpg    PREOPERATIVE REVIEW OF SYSTEMS:  she does not have a history of MRSA or VRE. she does not have a history of seizures. she does not have a history of phlebitis or blood clots in arms or legs. she does not have a history of sleep apnea. she  does not have a history of snoring loudly enough to be heard through a closed door. PHYSICAL EXAM:  Blood pressure 138/84, pulse 63, temperature 98.9 °F (37.2 °C), temperature source Oral, resp. rate 12, height 5' 3\" (1.6 m), weight 194 lb 12.8 oz (88.4 kg), SpO2 100 %, not currently breastfeeding. GEN: No acute distress  HEENT:  NCAT, PERRL, EOMI, Nares clear, turbinates pink, mucosa is moist.  Oropharynx  is clear. Hearing grossly intact. Dentition is normal for age. Neck: No lymphadenopathy or masses. Thyroid not palpable; no nodules or masses. Heart: RRR. S1 and S2 normal, no murmurs, clicks, gallops or rubs. No carotid bruits noted. Lungs:  CTAB,  No wheezing, ronchi, or rales. Normal symmetric air entry throughout both lung fields.   Abdomen:  Soft, non tender, non distended. No rebound or guarding. No organomegaly. Extremities:  No gross deformity, erythema or edema of the lower extremities. Skin: No pathologic lesions or significant rash. Psych:  Affect appropriate. Thought process is normal without evidence of depression or psychosis. Good insight and appropriae interaction. Cognition and memory appear to be intact. ASSESSMENT & PLAN  Eugenia Romero was seen today for pre-op exam and generalized body aches. Diagnoses and all orders for this visit:    Degenerative cervical spinal stenosis    Myelomalacia of cervical cord (HCC)    COVID-19 virus infection  -     vitamin D-3 (CHOLECALCIFEROL) 125 MCG (5000 UT) TABS; Take 1 tablet by mouth daily  -     Ascorbic Acid (VITAMIN C) 500 MG CAPS; Take 1 capsule by mouth daily  -     zinc 50 MG CAPS; Take 50 mg by mouth daily  -     POCT COVID-19, Rapid      - reviewed labs, EKG and chest xray  - acceptable surgical risk, may proceed with surgery  - COVID positive, needs to quarantine 5 days  - symptoms manageable with OTC meds  - work slip and add supplements    Return if symptoms worsen or fail to improve. Per 2014 ACC/AHA guidelines, patient may proceed with planned surgery. Patient has a Small Cardiac Risk Index score of 0 indicating 1% chance of perioperative cardiac complications. she will require standard VTE prophylaxis. Http://annals. org/article. aspx?tqxigpwna=992953  http://qszdguz2936. com/CardiacRisk_G.htm

## 2022-01-19 ENCOUNTER — TELEPHONE (OUTPATIENT)
Dept: FAMILY MEDICINE CLINIC | Age: 57
End: 2022-01-19

## 2022-01-19 RX ORDER — PROMETHAZINE HYDROCHLORIDE 25 MG/1
25 TABLET ORAL 3 TIMES DAILY PRN
Qty: 12 TABLET | Refills: 0 | Status: SHIPPED | OUTPATIENT
Start: 2022-01-19 | End: 2022-01-26

## 2022-01-19 NOTE — TELEPHONE ENCOUNTER
Did she recover and get sick again? Or has she been sick this whole time? I just wonder if she might have something else now, or if its still COVID.

## 2022-01-19 NOTE — TELEPHONE ENCOUNTER
I sent a Rx for phenergan to her pharmacy to help with the nausea. Work slip written to put her back to work tentatively 1/24. Would she be able to come in for regular office visit with me tomorrow or Friday for recheck?

## 2022-01-19 NOTE — LETTER
[address] [facility]  [address] [place] [address]  Phone: [phone]  Fax: [address] [facility]        January 19, 2022     Patient: Polo James   YOB: 1965   Date of Visit: 1/10/2022       To Whom it May Concern:    Marielena Munguia was seen in my clinic on 1/10/2022. She may return to work on 1/24/22. She has missed work due to complications from [place]. If you have any questions or concerns, please don't hesitate to call.     Sincerely,         Nanda Martin CNP

## 2022-01-19 NOTE — TELEPHONE ENCOUNTER
----- Message from Elizabethsailaja Moyer sent at 1/19/2022  8:38 AM EST -----  Subject: Message to Provider    QUESTIONS  Information for Provider? Patient tested positive for Covid 1/10/22 and   has been experiencing diarrhea, loss of appetite, can't keep anything   down, nauseous, fever of 100 and 102. Patient does not want to schedule   just want to see whta she should do.  ---------------------------------------------------------------------------  --------------  CALL BACK INFO  What is the best way for the office to contact you? OK to leave message on   voicemail  Preferred Call Back Phone Number? [phone]  ---------------------------------------------------------------------------  --------------  SCRIPT ANSWERS  Relationship to Patient?  Self

## 2022-01-19 NOTE — TELEPHONE ENCOUNTER
Patient has been informed and voiced understanding and states that an appointment on 01.20.2022 or 01.21.2022 will depend on how she feels and will call back

## 2022-01-20 VITALS — WEIGHT: 188 LBS | BODY MASS INDEX: 33.31 KG/M2 | HEIGHT: 63 IN

## 2022-01-20 NOTE — PROGRESS NOTES
Called Dr. Jr Roche office and left message letting them know patient covid pos 1/10 and still having symptoms of nausea

## 2022-01-20 NOTE — PROGRESS NOTES
In preparation for their surgical procedure above patient was screened for Obstructive Sleep Apnea (BARBARA) using the STOP-Bang Questionnaire by the Pre-Admission Testing department. This is a pre-surgical screening tool for patient safety and serves as a recommendation, this WILL NOT cause cancellation of surgery. STOP-Bang Questionnaire  * Do you currently see a pulmonologist?  No     If yes STOP, do not complete. Patient follows with Dr.     1.  Do you snore loudly (able to be heard in the next room)? No    2. Do you often feel tired or sleepy during the daytime? No       3. Has anyone ever told you that you stop breathing during your sleep? No    4. Do you have or are you being treated for high blood pressure? No      5. BMI more than 35? BMI (Calculated): 33.4        No    6. Age over 48 years? 60 y.o. Yes    7. Neck Circumference greater than 17 inches for male or 16 inches for female? Measured           (visits only)            Not Applicable    8. Gender Male? No      TOTAL SCORE: 1    BARBARA - Low Risk : Yes to 0 - 2 questions  BARBARA - Intermediate Risk : Yes to 3 - 4 questions  BARBARA - High Risk : Yes to 5 - 8 questions    Adapted from:   STOP Questionnaire: A Tool to Screen Patients for Obstructive Sleep Apnea   SHANTEL PandeyC.P.C., TOMASZ Farrar.B.B.S., Shruthi Newman M.D., [facility]. Joshua Maciel, Ph.D., TOMASZ Louise.B.B.S., TOMASZ Carvalho.Sc., Kristen Hickman M.D., [facility]. SERGIO Gr[employer]P.C[employer]    Anesthesiology 2008; 606:042-21 Copyright 2008, the [address] of Anesthesiologists, [facility] 37.   ----------------------------------------------------------------------------------------------------------------

## 2022-01-24 ENCOUNTER — HOSPITAL ENCOUNTER (OUTPATIENT)
Dept: PREADMISSION TESTING | Age: 57
Discharge: HOME | End: 2022-01-24

## 2022-01-24 ENCOUNTER — TELEPHONE (OUTPATIENT)
Dept: FAMILY MEDICINE CLINIC | Age: 57
End: 2022-01-24

## 2022-01-24 NOTE — TELEPHONE ENCOUNTER
----- Message from Marshatimothy Santiago sent at 1/21/2022 12:55 PM EST -----  Subject: Appointment Request    Reason for Call: Urgent Skin Problem    QUESTIONS  Type of Appointment? Established Patient  Reason for appointment request? No appointments available during search  Additional Information for Provider? Pt was seen in office on 1/10-covid   positive. This morning she noticed red spots on her breasts, legs and   stomach. She was not sure if it was caused by the medication she was   recently prescribed or something else. Due to the urgency, I transferred   her to the A/H line. Please advise.  ---------------------------------------------------------------------------  --------------  CALL BACK INFO  What is the best way for the office to contact you? Do not leave any   message, patient will call back for answer  Preferred Call Back Phone Number?  [phone]  ---------------------------------------------------------------------------  --------------  SCRIPT ANSWERS

## 2022-02-18 ENCOUNTER — HOSPITAL ENCOUNTER (OUTPATIENT)
Age: 57
Discharge: HOME | End: 2022-02-18
Payer: COMMERCIAL

## 2022-02-18 LAB
MRSA NASAL SCREEN RT-PCR: NEGATIVE
STAPH AUREUS SCREEN RT-PCR: NEGATIVE

## 2022-02-18 PROCEDURE — 87640 STAPH A DNA AMP PROBE: CPT

## 2022-02-18 PROCEDURE — 87641 MR-STAPH DNA AMP PROBE: CPT

## 2022-02-23 ENCOUNTER — APPOINTMENT (OUTPATIENT)
Dept: GENERAL RADIOLOGY | Age: 57
End: 2022-02-23
Attending: ORTHOPAEDIC SURGERY
Payer: COMMERCIAL

## 2022-02-23 ENCOUNTER — HOSPITAL ENCOUNTER (OUTPATIENT)
Age: 57
Discharge: HOME | End: 2022-02-24
Attending: ORTHOPAEDIC SURGERY | Admitting: ORTHOPAEDIC SURGERY
Payer: COMMERCIAL

## 2022-02-23 ENCOUNTER — ANESTHESIA (OUTPATIENT)
Dept: OPERATING ROOM | Age: 57
End: 2022-02-23
Payer: COMMERCIAL

## 2022-02-23 ENCOUNTER — ANESTHESIA EVENT (OUTPATIENT)
Dept: OPERATING ROOM | Age: 57
End: 2022-02-23
Payer: COMMERCIAL

## 2022-02-23 VITALS
TEMPERATURE: 98.2 F | SYSTOLIC BLOOD PRESSURE: 148 MMHG | OXYGEN SATURATION: 98 % | DIASTOLIC BLOOD PRESSURE: 82 MMHG | RESPIRATION RATE: 17 BRPM

## 2022-02-23 DIAGNOSIS — Z98.1 S/P CERVICAL SPINAL FUSION: Primary | ICD-10-CM

## 2022-02-23 PROBLEM — M48.02 CERVICAL SPINAL STENOSIS: Status: ACTIVE | Noted: 2022-02-23

## 2022-02-23 LAB
ABO: NORMAL
ANTIBODY SCREEN: NORMAL
RH FACTOR: NORMAL

## 2022-02-23 PROCEDURE — 2500000003 HC RX 250 WO HCPCS: Performed by: NURSE ANESTHETIST, CERTIFIED REGISTERED

## 2022-02-23 PROCEDURE — 6360000002 HC RX W HCPCS: Performed by: PHYSICIAN ASSISTANT

## 2022-02-23 PROCEDURE — 6360000002 HC RX W HCPCS: Performed by: NURSE ANESTHETIST, CERTIFIED REGISTERED

## 2022-02-23 PROCEDURE — 2580000003 HC RX 258: Performed by: ORTHOPAEDIC SURGERY

## 2022-02-23 PROCEDURE — 7100000011 HC PHASE II RECOVERY - ADDTL 15 MIN: Performed by: ORTHOPAEDIC SURGERY

## 2022-02-23 PROCEDURE — 72020 X-RAY EXAM OF SPINE 1 VIEW: CPT

## 2022-02-23 PROCEDURE — 3600000004 HC SURGERY LEVEL 4 BASE: Performed by: ORTHOPAEDIC SURGERY

## 2022-02-23 PROCEDURE — 7100000000 HC PACU RECOVERY - FIRST 15 MIN: Performed by: ORTHOPAEDIC SURGERY

## 2022-02-23 PROCEDURE — 36415 COLL VENOUS BLD VENIPUNCTURE: CPT

## 2022-02-23 PROCEDURE — 6370000000 HC RX 637 (ALT 250 FOR IP): Performed by: PHYSICIAN ASSISTANT

## 2022-02-23 PROCEDURE — 94760 N-INVAS EAR/PLS OXIMETRY 1: CPT

## 2022-02-23 PROCEDURE — 7100000010 HC PHASE II RECOVERY - FIRST 15 MIN: Performed by: ORTHOPAEDIC SURGERY

## 2022-02-23 PROCEDURE — 2709999900 HC NON-CHARGEABLE SUPPLY: Performed by: ORTHOPAEDIC SURGERY

## 2022-02-23 PROCEDURE — 7100000001 HC PACU RECOVERY - ADDTL 15 MIN: Performed by: ORTHOPAEDIC SURGERY

## 2022-02-23 PROCEDURE — 3700000001 HC ADD 15 MINUTES (ANESTHESIA): Performed by: ORTHOPAEDIC SURGERY

## 2022-02-23 PROCEDURE — C1713 ANCHOR/SCREW BN/BN,TIS/BN: HCPCS | Performed by: ORTHOPAEDIC SURGERY

## 2022-02-23 PROCEDURE — 86900 BLOOD TYPING SEROLOGIC ABO: CPT

## 2022-02-23 PROCEDURE — 6360000002 HC RX W HCPCS: Performed by: ANESTHESIOLOGY

## 2022-02-23 PROCEDURE — L0190 CERV COLLAR SUPP ADJ CERV BA: HCPCS | Performed by: ORTHOPAEDIC SURGERY

## 2022-02-23 PROCEDURE — 2500000003 HC RX 250 WO HCPCS: Performed by: ORTHOPAEDIC SURGERY

## 2022-02-23 PROCEDURE — L0120 CERV FLEX N/ADJ FOAM PRE OTS: HCPCS | Performed by: ORTHOPAEDIC SURGERY

## 2022-02-23 PROCEDURE — 3600000014 HC SURGERY LEVEL 4 ADDTL 15MIN: Performed by: ORTHOPAEDIC SURGERY

## 2022-02-23 PROCEDURE — 2720000010 HC SURG SUPPLY STERILE: Performed by: ORTHOPAEDIC SURGERY

## 2022-02-23 PROCEDURE — 86901 BLOOD TYPING SEROLOGIC RH(D): CPT

## 2022-02-23 PROCEDURE — 86850 RBC ANTIBODY SCREEN: CPT

## 2022-02-23 PROCEDURE — 2580000003 HC RX 258: Performed by: PHYSICIAN ASSISTANT

## 2022-02-23 PROCEDURE — 3700000000 HC ANESTHESIA ATTENDED CARE: Performed by: ORTHOPAEDIC SURGERY

## 2022-02-23 DEVICE — 4.0MM VARIABLE ANGLE SCREW, SELF-TAPPING, 14MM
Type: IMPLANTABLE DEVICE | Site: NECK | Status: FUNCTIONAL
Brand: ADMIRAL

## 2022-02-23 DEVICE — GRAFT BNE 2 CC DBM FIBREX: Type: IMPLANTABLE DEVICE | Site: NECK | Status: FUNCTIONAL

## 2022-02-23 DEVICE — ALLOGRAFT BNE SPACER SM 0 14X12X6 MM PARL CORTICAL ELEMAX: Type: IMPLANTABLE DEVICE | Site: NECK | Status: FUNCTIONAL

## 2022-02-23 RX ORDER — ONDANSETRON 4 MG/1
4 TABLET, ORALLY DISINTEGRATING ORAL EVERY 8 HOURS PRN
Status: DISCONTINUED | OUTPATIENT
Start: 2022-02-23 | End: 2022-02-24 | Stop reason: HOSPADM

## 2022-02-23 RX ORDER — LOPERAMIDE HYDROCHLORIDE 2 MG/1
2 CAPSULE ORAL 4 TIMES DAILY PRN
Status: DISCONTINUED | OUTPATIENT
Start: 2022-02-23 | End: 2022-02-24 | Stop reason: HOSPADM

## 2022-02-23 RX ORDER — SODIUM CHLORIDE 0.9 % (FLUSH) 0.9 %
5-40 SYRINGE (ML) INJECTION EVERY 12 HOURS SCHEDULED
Status: CANCELLED | OUTPATIENT
Start: 2022-02-23

## 2022-02-23 RX ORDER — SODIUM CHLORIDE 9 MG/ML
25 INJECTION, SOLUTION INTRAVENOUS PRN
Status: DISCONTINUED | OUTPATIENT
Start: 2022-02-23 | End: 2022-02-23 | Stop reason: HOSPADM

## 2022-02-23 RX ORDER — SENNA PLUS 8.6 MG/1
1 TABLET ORAL DAILY PRN
Status: DISCONTINUED | OUTPATIENT
Start: 2022-02-23 | End: 2022-02-24 | Stop reason: HOSPADM

## 2022-02-23 RX ORDER — ONDANSETRON 2 MG/ML
4 INJECTION INTRAMUSCULAR; INTRAVENOUS EVERY 6 HOURS PRN
Status: DISCONTINUED | OUTPATIENT
Start: 2022-02-23 | End: 2022-02-24 | Stop reason: HOSPADM

## 2022-02-23 RX ORDER — SODIUM CHLORIDE 9 MG/ML
25 INJECTION, SOLUTION INTRAVENOUS PRN
Status: CANCELLED | OUTPATIENT
Start: 2022-02-23

## 2022-02-23 RX ORDER — SODIUM CHLORIDE 9 MG/ML
INJECTION, SOLUTION INTRAVENOUS CONTINUOUS
Status: DISCONTINUED | OUTPATIENT
Start: 2022-02-23 | End: 2022-02-24 | Stop reason: HOSPADM

## 2022-02-23 RX ORDER — CEFAZOLIN SODIUM 2 G/100ML
2000 INJECTION, SOLUTION INTRAVENOUS
Status: COMPLETED | OUTPATIENT
Start: 2022-02-23 | End: 2022-02-23

## 2022-02-23 RX ORDER — FENTANYL CITRATE 50 UG/ML
50 INJECTION, SOLUTION INTRAMUSCULAR; INTRAVENOUS EVERY 5 MIN PRN
Status: CANCELLED | OUTPATIENT
Start: 2022-02-23

## 2022-02-23 RX ORDER — MORPHINE SULFATE 2 MG/ML
2 INJECTION, SOLUTION INTRAMUSCULAR; INTRAVENOUS EVERY 5 MIN PRN
Status: CANCELLED | OUTPATIENT
Start: 2022-02-23

## 2022-02-23 RX ORDER — SODIUM CHLORIDE 0.9 % (FLUSH) 0.9 %
5-40 SYRINGE (ML) INJECTION EVERY 12 HOURS SCHEDULED
Status: DISCONTINUED | OUTPATIENT
Start: 2022-02-23 | End: 2022-02-23 | Stop reason: HOSPADM

## 2022-02-23 RX ORDER — HYDRALAZINE HYDROCHLORIDE 20 MG/ML
10 INJECTION INTRAMUSCULAR; INTRAVENOUS
Status: DISCONTINUED | OUTPATIENT
Start: 2022-02-23 | End: 2022-02-23 | Stop reason: HOSPADM

## 2022-02-23 RX ORDER — SODIUM CHLORIDE 0.9 % (FLUSH) 0.9 %
10 SYRINGE (ML) INJECTION EVERY 12 HOURS SCHEDULED
Status: DISCONTINUED | OUTPATIENT
Start: 2022-02-23 | End: 2022-02-23 | Stop reason: HOSPADM

## 2022-02-23 RX ORDER — ONDANSETRON 2 MG/ML
4 INJECTION INTRAMUSCULAR; INTRAVENOUS
Status: DISCONTINUED | OUTPATIENT
Start: 2022-02-23 | End: 2022-02-23 | Stop reason: HOSPADM

## 2022-02-23 RX ORDER — OXYCODONE HYDROCHLORIDE AND ACETAMINOPHEN 5; 325 MG/1; MG/1
2 TABLET ORAL EVERY 4 HOURS PRN
Status: DISCONTINUED | OUTPATIENT
Start: 2022-02-23 | End: 2022-02-24 | Stop reason: HOSPADM

## 2022-02-23 RX ORDER — SODIUM CHLORIDE 9 MG/ML
25 INJECTION, SOLUTION INTRAVENOUS PRN
Status: DISCONTINUED | OUTPATIENT
Start: 2022-02-23 | End: 2022-02-24 | Stop reason: HOSPADM

## 2022-02-23 RX ORDER — DEXAMETHASONE SODIUM PHOSPHATE 4 MG/ML
8 INJECTION, SOLUTION INTRA-ARTICULAR; INTRALESIONAL; INTRAMUSCULAR; INTRAVENOUS; SOFT TISSUE EVERY 8 HOURS
Status: COMPLETED | OUTPATIENT
Start: 2022-02-23 | End: 2022-02-24

## 2022-02-23 RX ORDER — FENTANYL CITRATE 50 UG/ML
INJECTION, SOLUTION INTRAMUSCULAR; INTRAVENOUS PRN
Status: DISCONTINUED | OUTPATIENT
Start: 2022-02-23 | End: 2022-02-23 | Stop reason: SDUPTHER

## 2022-02-23 RX ORDER — SODIUM CHLORIDE 9 MG/ML
INJECTION, SOLUTION INTRAVENOUS CONTINUOUS
Status: DISCONTINUED | OUTPATIENT
Start: 2022-02-23 | End: 2022-02-23

## 2022-02-23 RX ORDER — SODIUM CHLORIDE 0.9 % (FLUSH) 0.9 %
5-40 SYRINGE (ML) INJECTION PRN
Status: DISCONTINUED | OUTPATIENT
Start: 2022-02-23 | End: 2022-02-24 | Stop reason: HOSPADM

## 2022-02-23 RX ORDER — SODIUM CHLORIDE 0.9 % (FLUSH) 0.9 %
5-40 SYRINGE (ML) INJECTION PRN
Status: DISCONTINUED | OUTPATIENT
Start: 2022-02-23 | End: 2022-02-23 | Stop reason: HOSPADM

## 2022-02-23 RX ORDER — TROSPIUM CHLORIDE 20 MG/1
20 TABLET, FILM COATED ORAL
Status: DISCONTINUED | OUTPATIENT
Start: 2022-02-23 | End: 2022-02-24 | Stop reason: HOSPADM

## 2022-02-23 RX ORDER — SUCCINYLCHOLINE/SOD CL,ISO/PF 200MG/10ML
SYRINGE (ML) INTRAVENOUS PRN
Status: DISCONTINUED | OUTPATIENT
Start: 2022-02-23 | End: 2022-02-23 | Stop reason: SDUPTHER

## 2022-02-23 RX ORDER — SODIUM CHLORIDE 0.9 % (FLUSH) 0.9 %
5-40 SYRINGE (ML) INJECTION EVERY 12 HOURS SCHEDULED
Status: DISCONTINUED | OUTPATIENT
Start: 2022-02-23 | End: 2022-02-24 | Stop reason: HOSPADM

## 2022-02-23 RX ORDER — MIDAZOLAM HYDROCHLORIDE 1 MG/ML
INJECTION INTRAMUSCULAR; INTRAVENOUS PRN
Status: DISCONTINUED | OUTPATIENT
Start: 2022-02-23 | End: 2022-02-23 | Stop reason: SDUPTHER

## 2022-02-23 RX ORDER — POLYETHYLENE GLYCOL 3350 17 G/17G
17 POWDER, FOR SOLUTION ORAL DAILY PRN
Status: DISCONTINUED | OUTPATIENT
Start: 2022-02-23 | End: 2022-02-24 | Stop reason: HOSPADM

## 2022-02-23 RX ORDER — SODIUM CHLORIDE 0.9 % (FLUSH) 0.9 %
5-40 SYRINGE (ML) INJECTION PRN
Status: CANCELLED | OUTPATIENT
Start: 2022-02-23

## 2022-02-23 RX ORDER — DEXAMETHASONE SODIUM PHOSPHATE 10 MG/ML
INJECTION, EMULSION INTRAMUSCULAR; INTRAVENOUS PRN
Status: DISCONTINUED | OUTPATIENT
Start: 2022-02-23 | End: 2022-02-23 | Stop reason: SDUPTHER

## 2022-02-23 RX ORDER — LIDOCAINE HYDROCHLORIDE AND EPINEPHRINE 10; 10 MG/ML; UG/ML
INJECTION, SOLUTION INFILTRATION; PERINEURAL PRN
Status: DISCONTINUED | OUTPATIENT
Start: 2022-02-23 | End: 2022-02-23 | Stop reason: ALTCHOICE

## 2022-02-23 RX ORDER — PROPOFOL 10 MG/ML
INJECTION, EMULSION INTRAVENOUS PRN
Status: DISCONTINUED | OUTPATIENT
Start: 2022-02-23 | End: 2022-02-23 | Stop reason: SDUPTHER

## 2022-02-23 RX ORDER — TOLTERODINE 4 MG/1
4 CAPSULE, EXTENDED RELEASE ORAL DAILY
Status: DISCONTINUED | OUTPATIENT
Start: 2022-02-23 | End: 2022-02-23 | Stop reason: CLARIF

## 2022-02-23 RX ORDER — ONDANSETRON 2 MG/ML
INJECTION INTRAMUSCULAR; INTRAVENOUS PRN
Status: DISCONTINUED | OUTPATIENT
Start: 2022-02-23 | End: 2022-02-23 | Stop reason: SDUPTHER

## 2022-02-23 RX ORDER — LIDOCAINE HCL/PF 100 MG/5ML
SYRINGE (ML) INJECTION PRN
Status: DISCONTINUED | OUTPATIENT
Start: 2022-02-23 | End: 2022-02-23 | Stop reason: SDUPTHER

## 2022-02-23 RX ORDER — LABETALOL 20 MG/4 ML (5 MG/ML) INTRAVENOUS SYRINGE
10
Status: DISCONTINUED | OUTPATIENT
Start: 2022-02-23 | End: 2022-02-23 | Stop reason: HOSPADM

## 2022-02-23 RX ORDER — CYCLOBENZAPRINE HCL 10 MG
10 TABLET ORAL 3 TIMES DAILY PRN
Status: DISCONTINUED | OUTPATIENT
Start: 2022-02-23 | End: 2022-02-24 | Stop reason: HOSPADM

## 2022-02-23 RX ORDER — SODIUM CHLORIDE 0.9 % (FLUSH) 0.9 %
10 SYRINGE (ML) INJECTION PRN
Status: DISCONTINUED | OUTPATIENT
Start: 2022-02-23 | End: 2022-02-23 | Stop reason: HOSPADM

## 2022-02-23 RX ORDER — DROPERIDOL 2.5 MG/ML
0.62 INJECTION, SOLUTION INTRAMUSCULAR; INTRAVENOUS
Status: DISCONTINUED | OUTPATIENT
Start: 2022-02-23 | End: 2022-02-23 | Stop reason: HOSPADM

## 2022-02-23 RX ORDER — ROCURONIUM BROMIDE 10 MG/ML
INJECTION, SOLUTION INTRAVENOUS PRN
Status: DISCONTINUED | OUTPATIENT
Start: 2022-02-23 | End: 2022-02-23 | Stop reason: SDUPTHER

## 2022-02-23 RX ORDER — OXYCODONE HYDROCHLORIDE AND ACETAMINOPHEN 5; 325 MG/1; MG/1
1 TABLET ORAL EVERY 4 HOURS PRN
Status: DISCONTINUED | OUTPATIENT
Start: 2022-02-23 | End: 2022-02-24 | Stop reason: HOSPADM

## 2022-02-23 RX ADMIN — DEXAMETHASONE SODIUM PHOSPHATE 8 MG: 10 INJECTION, EMULSION INTRAMUSCULAR; INTRAVENOUS at 09:40

## 2022-02-23 RX ADMIN — OXYCODONE AND ACETAMINOPHEN 2 TABLET: 5; 325 TABLET ORAL at 13:01

## 2022-02-23 RX ADMIN — TROSPIUM CHLORIDE 20 MG: 20 TABLET, FILM COATED ORAL at 20:41

## 2022-02-23 RX ADMIN — FENTANYL CITRATE 50 MCG: 50 INJECTION, SOLUTION INTRAMUSCULAR; INTRAVENOUS at 11:35

## 2022-02-23 RX ADMIN — OXYCODONE AND ACETAMINOPHEN 2 TABLET: 5; 325 TABLET ORAL at 18:05

## 2022-02-23 RX ADMIN — HYDROMORPHONE HYDROCHLORIDE 0.5 MG: 1 INJECTION, SOLUTION INTRAMUSCULAR; INTRAVENOUS; SUBCUTANEOUS at 13:02

## 2022-02-23 RX ADMIN — ROCURONIUM BROMIDE 20 MG: 50 INJECTION, SOLUTION INTRAVENOUS at 10:09

## 2022-02-23 RX ADMIN — FENTANYL CITRATE 50 MCG: 50 INJECTION, SOLUTION INTRAMUSCULAR; INTRAVENOUS at 09:55

## 2022-02-23 RX ADMIN — HYDROMORPHONE HYDROCHLORIDE 0.5 MG: 1 INJECTION, SOLUTION INTRAMUSCULAR; INTRAVENOUS; SUBCUTANEOUS at 12:33

## 2022-02-23 RX ADMIN — HYDROMORPHONE HYDROCHLORIDE 0.5 MG: 1 INJECTION, SOLUTION INTRAMUSCULAR; INTRAVENOUS; SUBCUTANEOUS at 12:45

## 2022-02-23 RX ADMIN — ONDANSETRON 4 MG: 2 INJECTION INTRAMUSCULAR; INTRAVENOUS at 11:48

## 2022-02-23 RX ADMIN — CEFAZOLIN 2000 MG: 10 INJECTION, POWDER, FOR SOLUTION INTRAVENOUS at 20:50

## 2022-02-23 RX ADMIN — SODIUM CHLORIDE, PRESERVATIVE FREE 10 ML: 5 INJECTION INTRAVENOUS at 20:43

## 2022-02-23 RX ADMIN — CEFAZOLIN SODIUM 2000 MG: 2 INJECTION, SOLUTION INTRAVENOUS at 09:10

## 2022-02-23 RX ADMIN — FENTANYL CITRATE 50 MCG: 50 INJECTION, SOLUTION INTRAMUSCULAR; INTRAVENOUS at 10:08

## 2022-02-23 RX ADMIN — SODIUM CHLORIDE: 9 INJECTION, SOLUTION INTRAVENOUS at 23:53

## 2022-02-23 RX ADMIN — Medication 100 MG: at 09:02

## 2022-02-23 RX ADMIN — ROCURONIUM BROMIDE 30 MG: 50 INJECTION, SOLUTION INTRAVENOUS at 09:12

## 2022-02-23 RX ADMIN — SODIUM CHLORIDE: 9 INJECTION, SOLUTION INTRAVENOUS at 18:15

## 2022-02-23 RX ADMIN — DEXAMETHASONE SODIUM PHOSPHATE 8 MG: 4 INJECTION, SOLUTION INTRA-ARTICULAR; INTRALESIONAL; INTRAMUSCULAR; INTRAVENOUS; SOFT TISSUE at 20:42

## 2022-02-23 RX ADMIN — CYCLOBENZAPRINE 10 MG: 10 TABLET, FILM COATED ORAL at 20:41

## 2022-02-23 RX ADMIN — ROCURONIUM BROMIDE 20 MG: 50 INJECTION, SOLUTION INTRAVENOUS at 10:58

## 2022-02-23 RX ADMIN — MIDAZOLAM 2 MG: 1 INJECTION INTRAMUSCULAR; INTRAVENOUS at 08:58

## 2022-02-23 RX ADMIN — SUGAMMADEX 200 MG: 100 INJECTION, SOLUTION INTRAVENOUS at 12:01

## 2022-02-23 RX ADMIN — SODIUM CHLORIDE: 9 INJECTION, SOLUTION INTRAVENOUS at 09:54

## 2022-02-23 RX ADMIN — Medication 140 MG: at 09:02

## 2022-02-23 RX ADMIN — FENTANYL CITRATE 100 MCG: 50 INJECTION, SOLUTION INTRAMUSCULAR; INTRAVENOUS at 09:02

## 2022-02-23 RX ADMIN — SODIUM CHLORIDE: 9 INJECTION, SOLUTION INTRAVENOUS at 08:11

## 2022-02-23 RX ADMIN — PROPOFOL 50 MG: 10 INJECTION, EMULSION INTRAVENOUS at 09:12

## 2022-02-23 RX ADMIN — PROPOFOL 150 MG: 10 INJECTION, EMULSION INTRAVENOUS at 09:02

## 2022-02-23 RX ADMIN — OXYCODONE AND ACETAMINOPHEN 2 TABLET: 5; 325 TABLET ORAL at 23:50

## 2022-02-23 RX ADMIN — ROCURONIUM BROMIDE 20 MG: 50 INJECTION, SOLUTION INTRAVENOUS at 09:33

## 2022-02-23 ASSESSMENT — PAIN DESCRIPTION - ONSET
ONSET: ON-GOING

## 2022-02-23 ASSESSMENT — PAIN DESCRIPTION - LOCATION
LOCATION: NECK

## 2022-02-23 ASSESSMENT — PULMONARY FUNCTION TESTS
PIF_VALUE: 18
PIF_VALUE: 19
PIF_VALUE: 20
PIF_VALUE: 18
PIF_VALUE: 20
PIF_VALUE: 19
PIF_VALUE: 20
PIF_VALUE: 18
PIF_VALUE: 19
PIF_VALUE: 20
PIF_VALUE: 20
PIF_VALUE: 19
PIF_VALUE: 14
PIF_VALUE: 20
PIF_VALUE: 19
PIF_VALUE: 20
PIF_VALUE: 19
PIF_VALUE: 20
PIF_VALUE: 17
PIF_VALUE: 20
PIF_VALUE: 20
PIF_VALUE: 15
PIF_VALUE: 20
PIF_VALUE: 19
PIF_VALUE: 20
PIF_VALUE: 19
PIF_VALUE: 18
PIF_VALUE: 16
PIF_VALUE: 19
PIF_VALUE: 20
PIF_VALUE: 0
PIF_VALUE: 0
PIF_VALUE: 20
PIF_VALUE: 0
PIF_VALUE: 18
PIF_VALUE: 20
PIF_VALUE: 19
PIF_VALUE: 20
PIF_VALUE: 19
PIF_VALUE: 20
PIF_VALUE: 14
PIF_VALUE: 9
PIF_VALUE: 20
PIF_VALUE: 19
PIF_VALUE: 20
PIF_VALUE: 1
PIF_VALUE: 20
PIF_VALUE: 19
PIF_VALUE: 20
PIF_VALUE: 20
PIF_VALUE: 19
PIF_VALUE: 19
PIF_VALUE: 21
PIF_VALUE: 20
PIF_VALUE: 20
PIF_VALUE: 19
PIF_VALUE: 21
PIF_VALUE: 20
PIF_VALUE: 21
PIF_VALUE: 20
PIF_VALUE: 19
PIF_VALUE: 20
PIF_VALUE: 19
PIF_VALUE: 17
PIF_VALUE: 21
PIF_VALUE: 1
PIF_VALUE: 20
PIF_VALUE: 18
PIF_VALUE: 21
PIF_VALUE: 19
PIF_VALUE: 20
PIF_VALUE: 28
PIF_VALUE: 20
PIF_VALUE: 20
PIF_VALUE: 19
PIF_VALUE: 18
PIF_VALUE: 17
PIF_VALUE: 18
PIF_VALUE: 20
PIF_VALUE: 18
PIF_VALUE: 20
PIF_VALUE: 20
PIF_VALUE: 3
PIF_VALUE: 20
PIF_VALUE: 19
PIF_VALUE: 21
PIF_VALUE: 20
PIF_VALUE: 20
PIF_VALUE: 18
PIF_VALUE: 20
PIF_VALUE: 20
PIF_VALUE: 12
PIF_VALUE: 19
PIF_VALUE: 20
PIF_VALUE: 4
PIF_VALUE: 20
PIF_VALUE: 26
PIF_VALUE: 20
PIF_VALUE: 26
PIF_VALUE: 20
PIF_VALUE: 21
PIF_VALUE: 17
PIF_VALUE: 19
PIF_VALUE: 20
PIF_VALUE: 19
PIF_VALUE: 20
PIF_VALUE: 20
PIF_VALUE: 14
PIF_VALUE: 20
PIF_VALUE: 21
PIF_VALUE: 15
PIF_VALUE: 20
PIF_VALUE: 1
PIF_VALUE: 19
PIF_VALUE: 20
PIF_VALUE: 20
PIF_VALUE: 18
PIF_VALUE: 15
PIF_VALUE: 17
PIF_VALUE: 20
PIF_VALUE: 19
PIF_VALUE: 20
PIF_VALUE: 19
PIF_VALUE: 20
PIF_VALUE: 20
PIF_VALUE: 19
PIF_VALUE: 14
PIF_VALUE: 20
PIF_VALUE: 20
PIF_VALUE: 17
PIF_VALUE: 23
PIF_VALUE: 19
PIF_VALUE: 20
PIF_VALUE: 0
PIF_VALUE: 20
PIF_VALUE: 20

## 2022-02-23 ASSESSMENT — PAIN DESCRIPTION - ORIENTATION
ORIENTATION: ANTERIOR
ORIENTATION: ANTERIOR
ORIENTATION: POSTERIOR
ORIENTATION: POSTERIOR
ORIENTATION: ANTERIOR
ORIENTATION: POSTERIOR
ORIENTATION: POSTERIOR
ORIENTATION: ANTERIOR

## 2022-02-23 ASSESSMENT — PAIN DESCRIPTION - DESCRIPTORS
DESCRIPTORS: ACHING
DESCRIPTORS: ACHING
DESCRIPTORS: ACHING;DISCOMFORT
DESCRIPTORS: ACHING
DESCRIPTORS: ACHING;DISCOMFORT
DESCRIPTORS: ACHING;DISCOMFORT
DESCRIPTORS: ACHING

## 2022-02-23 ASSESSMENT — PAIN SCALES - GENERAL
PAINLEVEL_OUTOF10: 8
PAINLEVEL_OUTOF10: 5
PAINLEVEL_OUTOF10: 7
PAINLEVEL_OUTOF10: 6
PAINLEVEL_OUTOF10: 8
PAINLEVEL_OUTOF10: 8
PAINLEVEL_OUTOF10: 9
PAINLEVEL_OUTOF10: 7

## 2022-02-23 ASSESSMENT — PAIN DESCRIPTION - PAIN TYPE
TYPE: SURGICAL PAIN

## 2022-02-23 ASSESSMENT — PAIN DESCRIPTION - FREQUENCY
FREQUENCY: CONTINUOUS

## 2022-02-23 ASSESSMENT — PAIN DESCRIPTION - PROGRESSION
CLINICAL_PROGRESSION: GRADUALLY WORSENING

## 2022-02-23 NOTE — PROGRESS NOTES
ADMITTED TO [facility] AND ORIENTED TO UNIT. SCDS ON. FALL AND ALLERGY BANDS ON. PT VERBALIZED APPROVAL FOR FIRST NAME, LAST INITIAL AND PHYSICIAN NAME ON UNIT WHITEBOARD. SPOUSE, Sharee WITH THE PATIENT.

## 2022-02-23 NOTE — PROGRESS NOTES
Pt to floor from [facility]. Alert and oriented x4 on arrival. VSS on room air. Pain to neck is a 7/10. Assessment complete. See flowsheets. Collar in place, BRYAN drain compressed and draining sanguinous drainage. The care plan and education has been reviewed and mutually agreed upon with the patient. Pt needs expressed, call light within reach.

## 2022-02-23 NOTE — BRIEF OP NOTE
Brief Postoperative Note      Patient: Darlene Lopez  YOB: 1965  MRN: [id number]    Date of Procedure: 2/23/2022    Pre-Op Diagnosis: CERVICAL DISC DISORDER WITH MYELOPATHY MID CERVICAL REGION UNSPECIFIED LEVEL OTHER SPONDYLOSIS WITH MYELOPATHY CERVICAL REGION    Post-Op Diagnosis: Same       Procedure(s):  ACDF C3-C7    Surgeon(s):  John Mills MD    [facility]    Anesthesia: General    Estimated Blood Loss (mL): less than 50     Complications: None    Specimens:   * No specimens in log *    Implants:  Implant Name Type Inv.  Item Serial No.  Lot No. LRB No. Used Action   GRAFT BNE 2 CC DBM FIBREX - [id number]  GRAFT BNE 2 CC DBM FIBREX [id number] [employer]  N/A 1 Implanted   ALLOGRAFT BNE SPACER SM 0 [id number] MM PARL CORTICAL ELEMAX - [id number]  ALLOGRAFT BNE SPACER SM 0 [id number] MM PARL CORTICAL ELEMAX [id number] [employer] [id number] N/A 1 Implanted   ALLOGRAFT BNE SPACER SM 0 [id number] MM PARL CORTICAL ELEMAX - [id number]  ALLOGRAFT BNE SPACER SM 0 [id number] MM [employer] CORTICAL ELEMAX [id number] [employer] [id number] N/A 1 Implanted   ALLOGRAFT BNE SPACER SM 0 [id number] MM PARL CORTICAL ELEMAX - [id number]  ALLOGRAFT BNE SPACER SM 0 [id number] MM PARL CORTICAL ELEMAX [id number] [employer] [id number] N/A 1 Implanted   ALLOGRAFT BNE SPACER SM 0 [id number] MM PARL CORTICAL ELEMAX - [id number]  ALLOGRAFT BNE SPACER SM 0 [id number] MM PARL CORTICAL ELEMAX [id number] [employer] [id number] N/A 1 Implanted   SCREW SPNL ST 4X14 MM VA ADMIRAL - [id number]  SCREW SPNL ST 4X14 MM VA ADMIRAL  [employer]  N/A 10 Implanted   cervical plate      N/A 1 Implanted         Drains:   Urethral Catheter Non-latex 16 fr (Active)       Findings: same    Electronically signed by John Mills MD on 2/23/2022 at 3:08 PM

## 2022-02-23 NOTE — H&P
[address] [place] [address]                       PREOPERATIVE HISTORY AND PHYSICAL    PATIENT NAME: JT MASON                   :        1965  MED REC NO:   [id number]                           ROOM:  ACCOUNT NO:   [de-identified]                           ADMIT DATE: 2022  PROVIDER:     Ambar High PA-C    She will be undergoing surgery on the date of 2022 at [facility] which includes ACDF of C3-C7 with allograft bone and  plating. CHIEF COMPLAINT:  Cervical pain and bilateral upper extremity pain with  loss of strength and dexterity. HISTORY OF PRESENT ILLNESS:  The patient is 60years of age and presents  to the office today for preoperative history and physical.  She is a  patient that has been dealing with significant symptoms of cervical pain  and bilateral upper extremity pain that have been bothering her now over  the past 2 years. She describes pain traveling typically worse into the  right upper extremity, but it does affect her on both sides with a  burning, aching pain, and pins and needle sensation. She has also noted  a change in her handwriting and dexterity with the use of her hands. She had previous treatments including a carpal tunnel release without  much relief of her hand symptoms. She has also been through physical  therapy without much relief and ultimately has failed conservative  treatment and is electing to proceed with further operative management. She has been medically cleared by her family provider, Lovely Lechuga CNP, to undergo this operation. DRUG ALLERGIES:  INCLUDE CIPROFLOXACIN. PAST MEDICAL HISTORY:  Includes history of kidney stones, bronchitis,  irritable bowel syndrome. CURRENT MEDICATIONS:  Include loperamide, Effexor, Myrbetriq, Tylenol  and ibuprofen which was discontinued prior to surgery.     PAST SURGICAL HISTORY:  Includes a hysterectomy in 1995, cholecystectomy  in 2007, right knee arthroscopic partial medial meniscectomy in 2008,  partial knee replacement in 2009, open treatment left distal radius  fracture in 2018, right carpal tunnel release in 2020. SOCIAL HISTORY:  Smoking status:  She is a former smoker. She currently  is . She is working full-time. Lives at home with her . REVIEW OF SYSTEMS:  GENERAL:  Denies fever, fatigue, or chills. RESPIRATORY:  Denies shortness of breath, productive cough, or wheezing. CARDIOVASCULAR:  Denies chest pain, palpitations, or leg swelling. GASTROINTESTINAL:  Denies nausea, vomiting, diarrhea, or abdominal pain. GENITOURINARY:  Denies dysuria or bladder incontinence. NEUROLOGIC:  Denies seizures, blackouts, fainting or headaches. MUSCULOSKELETAL:  Significant for neck pain, shoulder pain, arm pain. PHYSICAL EXAMINATION:  VITAL SIGNS:  Most recent vital signs show height 5 feet 3 inches,  weight 197 pounds, BMI 34.89. GENERAL:  The patient is pleasant, cooperative, awake, alert, and  oriented x3, seated in a chair, in no acute distress. MUSCULOSKELETAL:  Examination of the cervical spine shows no open wounds  or lesions. She is nontender over the midline or paraspinal muscle. Bilateral upper extremity exam shows skin warm and intact. Pulses +2 in  the radial artery. There is a decreased strength with hand , wrist  extension, elbow flexion, elbow extension, all rated 4/5. There is a  loss of sensation also noted with light touch to the right thumb, index  and long digit consistent with the C6 dermatomal region. Decker's sign  positive bilateral.    IMAGING STUDIES:  Cervical x-rays demonstrate multilevel disk height  loss with spondylosis without evidence of fracture.   Cervical MRI  imaging dated 11/16/2021 demonstrates severe spinal stenosis at levels  of C3-C4, C4-C5, C5-C6 and C6-C7 centrally and foraminally with  significant compression of the cervical spinal cord. ASSESSMENT:  Cervical spinal stenosis with radiculopathy and myelopathy. PLAN:  The patient will be undergoing surgery which includes ACDF of C3  to C7 with allograft bone and plating on the date of 02/23/2022 at [facility] under the care of Dr. Charmaine Castillo. CONCLUSION:  The patient is 46years of age and has been dealing with  significant symptoms of cervical pain, bilateral upper extremity  radiculopathy and symptoms of myelopathy. She has failed to improve. She has elected to proceed with further operative management. She has  been cleared by her family provider to undergo this operation. We have  received informed consent from the patient. We have gone over the risks  and benefits of surgery which include postoperative pain, voice change,  dysphagia, wound infection, nerve root injury, spinal fluid leak and  difficulty with anesthesia. We have answered the patient's questions  and concerns to her satisfaction. Absolutely, no guarantees have been  made for the results of the surgery.         Atiya [place], [place]    D: 02/22/2022 17:21:59       T: 02/22/2022 17:24:32     [email or web address]  Job#: [id number]     Doc#: [id number]

## 2022-02-23 NOTE — PROGRESS NOTES
1214- Pt arrived to PACU drowsy with no s/s of distress. VSS.    1343- Handoff given to TEQUILA Zarate at this time.

## 2022-02-23 NOTE — ANESTHESIA PRE PROCEDURE
incontinence, female) N39.3    History of lithotripsy Z98.890    Renal calculus N20.0    KATI (generalized anxiety disorder) F41.1    Moderate episode of recurrent major depressive disorder ([address] F33.1    Recurrent UTI N39.0    Ureteral stricture N13.5    Prediabetes R73.03    Primary osteoarthritis of both knees M17.0    Irritable bowel syndrome with diarrhea K58.0    Cervical radiculopathy M54.12       Past Medical History:        Diagnosis Date    Depression     KATI (generalized anxiety disorder)     GERD (gastroesophageal reflux disease)     Kidney stone     Prediabetes 10/18/2019       Past Surgical History:        Procedure Laterality Date    CARPAL TUNNEL RELEASE Right     CHOLECYSTECTOMY      COLONOSCOPY      CYSTO/URETERO/PYELOSCOPY, CALCULUS TX N/A 2017    CYSTOSCOPY, RIGHT URETEROSCOPY, BALLOON DILATION, STENT INSERTION performed by Ernesto Vargas MD at [address] [place] [address] Left     plate and screw    HYSTERECTOMY, TOTAL ABDOMINAL      KIDNEY STONE SURGERY      KIDNEY STONE SURGERY  2015    Right percutaneous nephrolithotomy     KNEE SURGERY Right     partial knee replacement    MA OFFICE/OUTPT VISIT,PROCEDURE ONLY Left 2018    LEFT WRIST OPEN REDUCTION INTERNAL FIXATION performed by Isabel Lambert MD at [facility] History:    Social History     Tobacco Use    Smoking status: Former Smoker     Packs/day: 0.50     Years: 20.00     Pack years: 10.00     Quit date: 1993     Years since quittin.5    Smokeless tobacco: Never Used   Substance Use Topics    Alcohol use: Not Currently                                Counseling given: Not Answered      Vital Signs (Current):   Vitals:    22 0700   BP: 136/68   Pulse: 63   Resp: 18   Temp: 98.1 °F (36.7 °C)   TempSrc: Temporal   SpO2: 95%   Weight: 192 lb (87.1 kg)   Height: 5' 3\" (1.6 m)                                              BP Readings from Last 3 Encounters: 02/23/22 136/68   01/10/22 138/84   12/13/21 114/74       NPO Status:                                                                                 BMI:   Wt Readings from Last 3 Encounters:   02/23/22 192 lb (87.1 kg)   01/20/22 188 lb (85.3 kg)   01/10/22 194 lb 12.8 oz (88.4 kg)     Body mass index is 34.01 kg/m². CBC:   Lab Results   Component Value Date    WBC 6.1 01/07/2022    RBC 4.84 01/07/2022    RBC 0 08/03/2016    HGB 16.0 01/07/2022    HCT 46.0 01/07/2022    MCV 95.0 01/07/2022    RDW 12.7 04/13/2018     01/07/2022       CMP:   Lab Results   Component Value Date     01/07/2022    K 4.0 01/07/2022     01/07/2022    CO2 22 01/07/2022    BUN 12 01/07/2022    CREATININE 1.0 01/07/2022    LABGLOM 57 01/07/2022    GLUCOSE 122 01/07/2022    GLUCOSE 192 08/13/2011    PROT 7.2 09/28/2021    CALCIUM 9.4 01/07/2022    BILITOT 0.5 09/28/2021    BILITOT 1+ 08/03/2016    ALKPHOS 100 09/28/2021    AST 22 09/28/2021    ALT 22 09/28/2021       POC Tests: No results for input(s): POCGLU, POCNA, POCK, POCCL, POCBUN, POCHEMO, POCHCT in the last 72 hours.     Coags:   Lab Results   Component Value Date    INR 1.02 04/13/2018    APTT 33.2 05/06/2015       HCG (If Applicable):   Lab Results   Component Value Date    PREGSERUM NEGATIVE 08/26/2011        ABGs: No results found for: PHART, PO2ART, FIZ5MXF, HMX0JJF, BEART, W4KJTQQJ     Type & Screen (If Applicable):  Lab Results   Component Value Date    LABRH POS 08/12/2011       Drug/Infectious Status (If Applicable):  Lab Results   Component Value Date    HEPCAB Negative 03/14/2015       COVID-19 Screening (If Applicable):   Lab Results   Component Value Date    COVID19 Positive 01/10/2022           Anesthesia Evaluation    Airway: Mallampati: II  TM distance: >3 FB     Mouth opening: > = 3 FB Dental:          Pulmonary: breath sounds clear to auscultation                             Cardiovascular:            Rhythm: regular Neuro/Psych:               GI/Hepatic/Renal:   (+) GERD:,           Endo/Other:                     Abdominal:   (+) obese,           Vascular: Other Findings:             Anesthesia Plan      general     ASA 2     (Weakness both upper extremities, more so on right)  Induction: intravenous. MIPS: Postoperative opioids intended and Prophylactic antiemetics administered. Anesthetic plan and risks discussed with patient and spouse. Plan discussed with CRNA.                   Erinn Ambriz MD   2/23/2022

## 2022-02-23 NOTE — ANESTHESIA POSTPROCEDURE EVALUATION
Department of Anesthesiology  Postprocedure Note    Patient: Lyudmila Pang  MRN: [id number]  YOB: 1965  Date of evaluation: 2/23/2022  Time:  1:38 PM     Procedure Summary     Date: 02/23/22 Room / Location: [facility] OR [address] [place] [address] OR    Anesthesia Start: 0858 Anesthesia Stop: 7224    Procedure: ACDF C3-C7 (N/A Neck) Diagnosis: (CERVICAL DISC DISORDER WITH MYELOPATHY MID CERVICAL REGION UNSPECIFIED LEVEL OTHER SPONDYLOSIS WITH MYELOPATHY CERVICAL REGION)    Surgeons: Kavin Walters MD Responsible Provider: Leann Fleischer, MD    Anesthesia Type: general ASA Status: 2          Anesthesia Type: general    Gypsy Phase I: Gypsy Score: 9    Gypsy Phase II:      Last vitals: Reviewed and per EMR flowsheets.        Anesthesia Post Evaluation    Patient location during evaluation: PACU  Patient participation: complete - patient participated  Level of consciousness: awake  Airway patency: patent  Nausea & Vomiting: no vomiting and no nausea  Complications: no  Cardiovascular status: hemodynamically stable  Respiratory status: acceptable and nasal cannula  Hydration status: stable

## 2022-02-23 NOTE — H&P
I have reviewed the history and physical and examined the patient. I find no relevant changes. I have reviewed with the patient and/or family members, during the preoperative office visit the risks, benefits, and alternatives to the procedure.     Giovanni Mathur MD

## 2022-02-24 VITALS
SYSTOLIC BLOOD PRESSURE: 121 MMHG | TEMPERATURE: 97.7 F | RESPIRATION RATE: 16 BRPM | OXYGEN SATURATION: 91 % | HEIGHT: 63 IN | WEIGHT: 192 LBS | DIASTOLIC BLOOD PRESSURE: 71 MMHG | HEART RATE: 88 BPM | BODY MASS INDEX: 34.02 KG/M2

## 2022-02-24 PROCEDURE — 6360000002 HC RX W HCPCS: Performed by: PHYSICIAN ASSISTANT

## 2022-02-24 PROCEDURE — 94760 N-INVAS EAR/PLS OXIMETRY 1: CPT

## 2022-02-24 PROCEDURE — 6370000000 HC RX 637 (ALT 250 FOR IP): Performed by: PHYSICIAN ASSISTANT

## 2022-02-24 PROCEDURE — 97162 PT EVAL MOD COMPLEX 30 MIN: CPT

## 2022-02-24 PROCEDURE — 2580000003 HC RX 258: Performed by: PHYSICIAN ASSISTANT

## 2022-02-24 PROCEDURE — 97116 GAIT TRAINING THERAPY: CPT

## 2022-02-24 RX ORDER — CYCLOBENZAPRINE HCL 10 MG
10 TABLET ORAL 3 TIMES DAILY PRN
Qty: 50 TABLET | Refills: 0 | Status: SHIPPED | OUTPATIENT
Start: 2022-02-24 | End: 2022-03-06

## 2022-02-24 RX ORDER — OXYCODONE HYDROCHLORIDE AND ACETAMINOPHEN 5; 325 MG/1; MG/1
1 TABLET ORAL EVERY 4 HOURS PRN
Qty: 42 TABLET | Refills: 0 | Status: SHIPPED | OUTPATIENT
Start: 2022-02-24 | End: 2022-03-03

## 2022-02-24 RX ADMIN — DEXAMETHASONE SODIUM PHOSPHATE 8 MG: 4 INJECTION, SOLUTION INTRA-ARTICULAR; INTRALESIONAL; INTRAMUSCULAR; INTRAVENOUS; SOFT TISSUE at 01:50

## 2022-02-24 RX ADMIN — CEFAZOLIN 2000 MG: 10 INJECTION, POWDER, FOR SOLUTION INTRAVENOUS at 01:50

## 2022-02-24 RX ADMIN — OXYCODONE AND ACETAMINOPHEN 2 TABLET: 5; 325 TABLET ORAL at 14:45

## 2022-02-24 RX ADMIN — OXYCODONE AND ACETAMINOPHEN 2 TABLET: 5; 325 TABLET ORAL at 10:02

## 2022-02-24 RX ADMIN — TROSPIUM CHLORIDE 20 MG: 20 TABLET, FILM COATED ORAL at 05:17

## 2022-02-24 RX ADMIN — OXYCODONE AND ACETAMINOPHEN 2 TABLET: 5; 325 TABLET ORAL at 05:17

## 2022-02-24 RX ADMIN — DEXAMETHASONE SODIUM PHOSPHATE 8 MG: 4 INJECTION, SOLUTION INTRA-ARTICULAR; INTRALESIONAL; INTRAMUSCULAR; INTRAVENOUS; SOFT TISSUE at 10:02

## 2022-02-24 RX ADMIN — TROSPIUM CHLORIDE 20 MG: 20 TABLET, FILM COATED ORAL at 15:19

## 2022-02-24 ASSESSMENT — PAIN DESCRIPTION - PROGRESSION

## 2022-02-24 ASSESSMENT — PAIN SCALES - GENERAL
PAINLEVEL_OUTOF10: 7
PAINLEVEL_OUTOF10: 8
PAINLEVEL_OUTOF10: 7
PAINLEVEL_OUTOF10: 8
PAINLEVEL_OUTOF10: 7
PAINLEVEL_OUTOF10: 4

## 2022-02-24 ASSESSMENT — PAIN DESCRIPTION - ORIENTATION
ORIENTATION: ANTERIOR
ORIENTATION: ANTERIOR

## 2022-02-24 ASSESSMENT — PAIN DESCRIPTION - PAIN TYPE
TYPE: SURGICAL PAIN
TYPE: SURGICAL PAIN

## 2022-02-24 ASSESSMENT — PAIN DESCRIPTION - ONSET
ONSET: ON-GOING
ONSET: ON-GOING

## 2022-02-24 ASSESSMENT — PAIN DESCRIPTION - LOCATION
LOCATION: NECK
LOCATION: NECK

## 2022-02-24 ASSESSMENT — PAIN DESCRIPTION - FREQUENCY
FREQUENCY: CONTINUOUS
FREQUENCY: CONTINUOUS

## 2022-02-24 ASSESSMENT — PAIN - FUNCTIONAL ASSESSMENT: PAIN_FUNCTIONAL_ASSESSMENT: PREVENTS OR INTERFERES SOME ACTIVE ACTIVITIES AND ADLS

## 2022-02-24 ASSESSMENT — PAIN DESCRIPTION - DESCRIPTORS
DESCRIPTORS: DISCOMFORT
DESCRIPTORS: ACHING;DISCOMFORT

## 2022-02-24 NOTE — PROGRESS NOTES
1534 discharge instructions reviewed and taught using teach--back method. Patient denies any questions. Await for medications from pharmacy and  to arrive for discharge. Patient discharged via wheelchair. Personal belongings sent with patient.

## 2022-02-24 NOTE — PROGRESS NOTES
[address] [id number]  INPATIENT PHYSICAL THERAPY  EVALUATION  [facility] ORTHOPEDICS 7K - 7K-12/012-A    Time In: 3874  Time Out: 1513  Timed Code Treatment Minutes: 15 Minutes  Minutes: 27          Date: 2022  Patient Name: Wilbur Ho,  Gender:  female        MRN: [id number]  : 1965  (60 y.o.)      Referring Practitioner: Manisha Pascual MD  Diagnosis: Cervical spinal stenosis  Additional Pertinent Hx: Pt admitted for and is s/p ACDF C3-C7 by Dr Jaylon Shah on 22. Restrictions/Precautions:  Restrictions/Precautions: General Precautions,Fall Risk  Position Activity Restriction  Spinal Precautions: No Bending,No Lifting,No Twisting    Subjective:  Chart Reviewed: Yes  Patient assessed for rehabilitation services?: Yes  Subjective: RN approved session. Pt resting in bed and agrees to therapy.     General:       Vision: Impaired  Vision Exceptions: Wears glasses at all times    Hearing: Within functional limits         Pain: 8/10: neck    Vitals: Vitals not assessed per clinical judgement, see nursing flowsheet    Social/Functional History:    Lives With: Spouse  Type of Home: House  Home Layout: One level  Home Access: Stairs to enter with rails,Stairs to enter without rails  Entrance Stairs - Number of Steps: 2 steps and holds onto door or her   Home Equipment:  (none)                   Ambulation Assistance: Independent  Transfer Assistance: Independent    Active : Yes          OBJECTIVE:  Range of Motion:  Bilateral Lower Extremity: WFL    Strength:  Bilateral Lower Extremity: WFL    Balance:  Static Sitting Balance:  Supervision  Dynamic Sitting Balance: Supervision  Static Standing Balance: Stand By Assistance  Dynamic Standing Balance: Stand By Assistance    Bed Mobility:  Supine to Sit: Stand By Assistance, with head of bed raised, with rail, with increased time for completion  Sit to Supine: Stand By Assistance     Transfers:  Sit to Stand: Supervision  Stand to Sit:Supervision    Ambulation:  Supervision, Stand By Assistance  Distance: 250 ft  Surface: Level Tile  Device:No Device and occasional use of suggs rail  Gait Deviations:  Decreased Step Length Bilaterally and Decreased Gait Speed    Exercise:  Patient was guided in 1 set(s) 5-7 reps of exercise to both lower extremities. Ankle pumps, Glut sets, Quad sets, Heelslides, Hip abduction/adduction, Seated marches and Long arc quads. Exercises were completed for increased independence with functional mobility. Functional Outcome Measures: Not completed       ASSESSMENT:  Activity Tolerance:  Patient tolerance of  treatment: good. Treatment Initiated: Treatment and education initiated within context of evaluation. Evaluation time included review of current medical information, gathering information related to past medical, social and functional history, completion of standardized testing, formal and informal observation of tasks, assessment of data and development of plan of care and goals. Treatment time included skilled education and facilitation of tasks to increase safety and independence with functional mobility for improved independence and quality of life. Assessment: Body structures, Functions, Activity limitations: Decreased functional mobility ,Decreased strength  Assessment: Pt is a 61 yo female that is s/p ACDF C3-C7. Pt participated well with therapy and is looking forward to going home. Pt found out that she will be discharged at end of this session. Pt was receptive of education for replacement pads for the neck brace and LE exercises.   Prognosis: Excellent    REQUIRES PT FOLLOW UP: No (Pt discharging this afternoon.)    Discharge Recommendations:  Discharge Recommendations: 24 hour supervision or assist,Home with assist PRN    Patient Education:  PT Education: Krishna Raines [facility],Home Exercise Program  Patient Education: brace replacement pads    Equipment Recommendations:  Equipment Needed: No    Plan:  Times per week: none  Current Treatment Recommendations: Functional Mobility Training,Gait Training,Home Exercise Program,Patient/Caregiver Education & Training    Goals:  Patient goals : go home  Short term goals  Time Frame for Short term goals: not set. Pt being discharged home this afternoon. Following session, patient left in safe position with all fall risk precautions in place. Craige Deal.  Martinez Becker, Mary [place] 8

## 2022-02-24 NOTE — CARE COORDINATION
2/24/22, 7:25 AM EST  DISCHARGE PLANNING EVALUATION:    Emily Clifton       Admitted: 2/23/2022/ [address] [employer] day: 0   Location: -12/012-A Reason for admit: Cervical spinal stenosis [M48.02]   PMH:  has a past medical history of Depression, KATI (generalized anxiety disorder), GERD (gastroesophageal reflux disease), Kidney stone, and Prediabetes. Procedure:   2/23   Anterior cervical diskectomies at levels of C3-C4, C4-C5, C5-C6 and  C6-C7 with a complete uncus decompression and clearance of canal for  each of these four levels of diskectomy. 2.  Anterior cervical interbody fusion at levels of C3-C4, C4-C5, C5-C6  and C6-C7, with plating of level C3-C7. Barriers to Discharge:  POD #1, up with therapy, N/V checks, pain control, monitor drain output. PT eval to ensure safe for discharge. CAlled several [facility] agencies: [facility], [facility] and [facility] cannot take pt insurance. Will try other [facility] agencies that will accept pt.    1510- [facility] can accept this pt for [place] drain management (x1 BRYAN). PCP: Jina Doherty, APRN - CNP   %    Patient Goals/Plan/Treatment Preferences: Met with Lobito Alvarez; she lives home with her  Jeremi Aviles. She has PCP, insurance, and declines DME needs. Working on getting pt [facility] agency for drain mgmt and removal.      Transportation/Food Security/Housekeeping Addressed:  No issues identified. 2/24/22, 3:15 PM EST    Patient goals/plan/ treatment preferences discussed by  and . Patient goals/plan/ treatment preferences reviewed with patient/ family. Patient/ family verbalize understanding of discharge plan and are in agreement with goal/plan/treatment preferences. Understanding was demonstrated using the teach back method.   AVS provided by RN at time of discharge, which includes all necessary medical information pertaining to the patients current course of illness, treatment, post-discharge goals of care, and treatment preferences.     Services After Discharge  Services At/After Discharge: Nursing Services ADMIT

## 2022-02-24 NOTE — PROGRESS NOTES
Department of Orthopedic Surgery  Spine Service  Abran Pfeiffer PA-C Progress Note        Subjective:    2/24/22  [place] is resting in bed. Sore as expected. Has tolerated soft foods so far. Continued numbness in arms/hands. Will be up this AM after breakfast and planning discharge home. Vitals  VITALS:  /70   Pulse 84   Temp 97.8 °F (36.6 °C) (Oral)   Resp 16   Ht 5' 3\" (1.6 m)   Wt 192 lb (87.1 kg)   SpO2 93%   BMI 34.01 kg/m²   24HR INTAKE/OUTPUT:    Intake/Output Summary (Last 24 hours) at 2/24/2022 0725  Last data filed at 2/24/2022 2967  Gross per 24 hour   Intake 3291 ml   Output 770 ml   Net 2521 ml     URINARY CATHETER OUTPUT ([place]):     DRAIN/TUBE OUTPUT:  Closed/Suction Drain Anterior Neck Bulb-Output (ml): 20 ml      PHYSICAL EXAM:    Orientation:  alert and oriented to person, place and time    Incision:  dressing in place, clean, dry, intact    Upper Extremity Motor :   Deltoid:  5/5  Biceps:  5/5  Triceps:  5/5  Wrist Flexion:  5/5  Wrist Extension:  5/5  Finger Flexion:  5/5  Finger Extension:  5/5    Upper Motor Neuron Signs:  None  Upper Extremity Sensory:  Intact C3-T1 distribution    Flatus:  negative    ABNORMAL EXAM FINDINGS:  none    LABS:    HgB:    Lab Results   Component Value Date    HGB 16.0 01/07/2022         ASSESSMENT AND PLAN:    Post operative day 1     1:  Monitor labs and drain output  2:  Activity Level:  OOB as tolerated  3:  Pain Control:  Controlled  4:  Discharge Planning:  Home today with [facility] for drain.

## 2022-02-25 ENCOUNTER — TELEPHONE (OUTPATIENT)
Dept: FAMILY MEDICINE CLINIC | Age: 57
End: 2022-02-25

## 2022-02-25 NOTE — TELEPHONE ENCOUNTER
[facility] Transitions Initial Follow Up Call    Outreach made within 2 business days of discharge: Yes    Patient: Kandice Mathias Patient : 1965   MRN: [id number]  Reason for Admission: There are no discharge diagnoses documented for the most recent discharge.   Discharge Date: 22       Spoke with: [facility] to call back at earliest convenience     Discharge department/facility: [facility]

## 2022-02-25 NOTE — TELEPHONE ENCOUNTER
[facility] Transitions Initial Follow Up Call    Outreach made within 2 business days of discharge: Yes    Patient: Ralph Galeas Patient : 1965   MRN: [id number]  Reason for Admission: There are no discharge diagnoses documented for the most recent discharge. Discharge Date: 22       Spoke with: [place]    Discharge department/facility: [facility]    TCM Interactive Patient Contact:  Was patient able to fill all prescriptions: Yes  Was patient instructed to bring all medications to the follow-up visit: Yes  Is patient taking all medications as directed in the discharge summary?  Yes  Does patient understand their discharge instructions: Yes  Does patient have questions or concerns that need addressed prior to 7-14 day follow up office visit: no    Scheduled appointment with PCP within 7-14 days    Follow Up  Future Appointments   Date Time Provider Timmy Tarango   2022  1:20 PM OPAL Teague - [facility] - Elizabeth Parker LPN

## 2022-11-02 ENCOUNTER — HOSPITAL ENCOUNTER (OUTPATIENT)
Dept: WOMENS IMAGING | Age: 57
Discharge: HOME | End: 2022-11-02
Payer: COMMERCIAL

## 2022-11-02 DIAGNOSIS — Z12.31 VISIT FOR SCREENING MAMMOGRAM: ICD-10-CM

## 2022-11-02 PROCEDURE — 77067 SCR MAMMO BI INCL CAD: CPT

## 2022-11-03 ENCOUNTER — TELEPHONE (OUTPATIENT)
Dept: FAMILY MEDICINE CLINIC | Age: 57
End: 2022-11-03

## 2022-11-03 NOTE — TELEPHONE ENCOUNTER
----- Message from Anastasiia Roberts, OPAL - CNP sent at 11/3/2022  3:16 PM EDT -----  Let Marycruz Monzon know her mammogram was normal.

## 2023-08-08 ASSESSMENT — PATIENT HEALTH QUESTIONNAIRE - PHQ9
1. LITTLE INTEREST OR PLEASURE IN DOING THINGS: NOT AT ALL
SUM OF ALL RESPONSES TO PHQ QUESTIONS 1-9: 5
6. FEELING BAD ABOUT YOURSELF - OR THAT YOU ARE A FAILURE OR HAVE LET YOURSELF OR YOUR FAMILY DOWN: 0
2. FEELING DOWN, DEPRESSED OR HOPELESS: NOT AT ALL
9. THOUGHTS THAT YOU WOULD BE BETTER OFF DEAD, OR OF HURTING YOURSELF: 0
4. FEELING TIRED OR HAVING LITTLE ENERGY: MORE THAN HALF THE DAYS
9. THOUGHTS THAT YOU WOULD BE BETTER OFF DEAD, OR OF HURTING YOURSELF: NOT AT ALL
6. FEELING BAD ABOUT YOURSELF - OR THAT YOU ARE A FAILURE OR HAVE LET YOURSELF OR YOUR FAMILY DOWN: NOT AT ALL
5. POOR APPETITE OR OVEREATING: MORE THAN HALF THE DAYS
SUM OF ALL RESPONSES TO PHQ9 QUESTIONS 1 & 2: 0
1. LITTLE INTEREST OR PLEASURE IN DOING THINGS: 0
SUM OF ALL RESPONSES TO PHQ QUESTIONS 1-9: 5
5. POOR APPETITE OR OVEREATING: 2
SUM OF ALL RESPONSES TO PHQ QUESTIONS 1-9: 5
10. IF YOU CHECKED OFF ANY PROBLEMS, HOW DIFFICULT HAVE THESE PROBLEMS MADE IT FOR YOU TO DO YOUR WORK, TAKE CARE OF THINGS AT HOME, OR GET ALONG WITH OTHER PEOPLE: NOT DIFFICULT AT ALL
7. TROUBLE CONCENTRATING ON THINGS, SUCH AS READING THE NEWSPAPER OR WATCHING TELEVISION: 0
10. IF YOU CHECKED OFF ANY PROBLEMS, HOW DIFFICULT HAVE THESE PROBLEMS MADE IT FOR YOU TO DO YOUR WORK, TAKE CARE OF THINGS AT HOME, OR GET ALONG WITH OTHER PEOPLE: 0
4. FEELING TIRED OR HAVING LITTLE ENERGY: 2
SUM OF ALL RESPONSES TO PHQ QUESTIONS 1-9: 5
3. TROUBLE FALLING OR STAYING ASLEEP: 0
2. FEELING DOWN, DEPRESSED OR HOPELESS: 0
3. TROUBLE FALLING OR STAYING ASLEEP: NOT AT ALL
8. MOVING OR SPEAKING SO SLOWLY THAT OTHER PEOPLE COULD HAVE NOTICED. OR THE OPPOSITE - BEING SO FIDGETY OR RESTLESS THAT YOU HAVE BEEN MOVING AROUND A LOT MORE THAN USUAL: SEVERAL DAYS
8. MOVING OR SPEAKING SO SLOWLY THAT OTHER PEOPLE COULD HAVE NOTICED. OR THE OPPOSITE, BEING SO FIGETY OR RESTLESS THAT YOU HAVE BEEN MOVING AROUND A LOT MORE THAN USUAL: 1
SUM OF ALL RESPONSES TO PHQ QUESTIONS 1-9: 5
7. TROUBLE CONCENTRATING ON THINGS, SUCH AS READING THE NEWSPAPER OR WATCHING TELEVISION: NOT AT ALL

## 2023-08-11 ENCOUNTER — HOSPITAL ENCOUNTER (OUTPATIENT)
Age: 58
Discharge: HOME | End: 2023-08-11
Payer: COMMERCIAL

## 2023-08-11 ENCOUNTER — OFFICE VISIT (OUTPATIENT)
Dept: FAMILY MEDICINE CLINIC | Age: 58
End: 2023-08-11

## 2023-08-11 ENCOUNTER — HOSPITAL ENCOUNTER (OUTPATIENT)
Dept: GENERAL RADIOLOGY | Age: 58
Discharge: HOME | End: 2023-08-11
Payer: COMMERCIAL

## 2023-08-11 VITALS
OXYGEN SATURATION: 97 % | DIASTOLIC BLOOD PRESSURE: 80 MMHG | SYSTOLIC BLOOD PRESSURE: 132 MMHG | HEART RATE: 65 BPM | HEIGHT: 63 IN | TEMPERATURE: 97.9 F | RESPIRATION RATE: 12 BRPM | WEIGHT: 180.4 LBS | BODY MASS INDEX: 31.96 KG/M2

## 2023-08-11 DIAGNOSIS — N32.81 OAB (OVERACTIVE BLADDER): ICD-10-CM

## 2023-08-11 DIAGNOSIS — E55.9 VITAMIN D DEFICIENCY: ICD-10-CM

## 2023-08-11 DIAGNOSIS — M17.11 PRIMARY OSTEOARTHRITIS OF RIGHT KNEE: Primary | ICD-10-CM

## 2023-08-11 DIAGNOSIS — R73.03 PREDIABETES: ICD-10-CM

## 2023-08-11 DIAGNOSIS — M17.11 PRIMARY OSTEOARTHRITIS OF RIGHT KNEE: ICD-10-CM

## 2023-08-11 DIAGNOSIS — M15.9 PRIMARY OSTEOARTHRITIS INVOLVING MULTIPLE JOINTS: ICD-10-CM

## 2023-08-11 PROBLEM — G95.89 MYELOMALACIA OF CERVICAL CORD (HCC): Status: RESOLVED | Noted: 2023-08-11 | Resolved: 2023-08-11

## 2023-08-11 PROBLEM — G95.89 MYELOMALACIA OF CERVICAL CORD (HCC): Status: ACTIVE | Noted: 2023-08-11

## 2023-08-11 PROBLEM — M15.0 PRIMARY OSTEOARTHRITIS INVOLVING MULTIPLE JOINTS: Status: ACTIVE | Noted: 2023-08-11

## 2023-08-11 LAB
25(OH)D3 SERPL-MCNC: 10 NG/ML (ref 30–100)
ALBUMIN SERPL BCG-MCNC: 4.4 G/DL (ref 3.5–5.1)
ALP SERPL-CCNC: 97 U/L (ref 38–126)
ALT SERPL W/O P-5'-P-CCNC: 18 U/L (ref 11–66)
ANION GAP SERPL CALC-SCNC: 11 MEQ/L (ref 8–16)
AST SERPL-CCNC: 19 U/L (ref 5–40)
BILIRUB SERPL-MCNC: 0.4 MG/DL (ref 0.3–1.2)
BUN SERPL-MCNC: 9 MG/DL (ref 7–22)
CALCIUM SERPL-MCNC: 9.3 MG/DL (ref 8.5–10.5)
CHLORIDE SERPL-SCNC: 103 MEQ/L (ref 98–111)
CHOLESTEROL, FASTING: 171 MG/DL (ref 100–199)
CO2 SERPL-SCNC: 28 MEQ/L (ref 23–33)
CREAT SERPL-MCNC: 1.1 MG/DL (ref 0.4–1.2)
CREAT UR-MCNC: 267.8 MG/DL
DEPRECATED MEAN GLUCOSE BLD GHB EST-ACNC: 123 MG/DL (ref 70–126)
GFR SERPL CREATININE-BSD FRML MDRD: 58 ML/MIN/1.73M2
GLUCOSE SERPL-MCNC: 107 MG/DL (ref 70–108)
HBA1C MFR BLD HPLC: 6.1 % (ref 4.4–6.4)
HDLC SERPL-MCNC: 45 MG/DL
LDLC SERPL CALC-MCNC: 102 MG/DL
MICROALBUMIN UR-MCNC: 1.43 MG/DL
MICROALBUMIN/CREAT RATIO PNL UR: 5 MG/G (ref 0–30)
POTASSIUM SERPL-SCNC: 4.1 MEQ/L (ref 3.5–5.2)
PROT SERPL-MCNC: 7.5 G/DL (ref 6.1–8)
SODIUM SERPL-SCNC: 142 MEQ/L (ref 135–145)
TRIGLYCERIDE, FASTING: 118 MG/DL (ref 0–199)

## 2023-08-11 PROCEDURE — 80053 COMPREHEN METABOLIC PANEL: CPT

## 2023-08-11 PROCEDURE — 83036 HEMOGLOBIN GLYCOSYLATED A1C: CPT

## 2023-08-11 PROCEDURE — 82043 UR ALBUMIN QUANTITATIVE: CPT

## 2023-08-11 PROCEDURE — 36415 COLL VENOUS BLD VENIPUNCTURE: CPT

## 2023-08-11 PROCEDURE — 82306 VITAMIN D 25 HYDROXY: CPT

## 2023-08-11 PROCEDURE — 73562 X-RAY EXAM OF KNEE 3: CPT

## 2023-08-11 PROCEDURE — 80061 LIPID PANEL: CPT

## 2023-08-11 RX ORDER — MELOXICAM 7.5 MG/1
7.5 TABLET ORAL DAILY
Qty: 30 TABLET | Refills: 3 | Status: SHIPPED | OUTPATIENT
Start: 2023-08-11

## 2023-08-11 SDOH — ECONOMIC STABILITY: HOUSING INSECURITY
IN THE LAST 12 MONTHS, WAS THERE A TIME WHEN YOU DID NOT HAVE A STEADY PLACE TO SLEEP OR SLEPT IN A SHELTER (INCLUDING NOW)?: NO

## 2023-08-11 SDOH — ECONOMIC STABILITY: FOOD INSECURITY: WITHIN THE PAST 12 MONTHS, THE FOOD YOU BOUGHT JUST DIDN'T LAST AND YOU DIDN'T HAVE MONEY TO GET MORE.: NEVER TRUE

## 2023-08-11 SDOH — ECONOMIC STABILITY: FOOD INSECURITY: WITHIN THE PAST 12 MONTHS, YOU WORRIED THAT YOUR FOOD WOULD RUN OUT BEFORE YOU GOT MONEY TO BUY MORE.: NEVER TRUE

## 2023-08-11 SDOH — ECONOMIC STABILITY: INCOME INSECURITY: HOW HARD IS IT FOR YOU TO PAY FOR THE VERY BASICS LIKE FOOD, HOUSING, MEDICAL CARE, AND HEATING?: NOT HARD AT ALL

## 2023-08-11 NOTE — PROGRESS NOTES
n/a    Falls screening - n/a    Current Outpatient Medications   Medication Sig Dispense Refill    diclofenac sodium (VOLTAREN) 1 % GEL Apply 4 g topically 4 times daily 100 g 1    meloxicam (MOBIC) 7.5 MG tablet Take 1 tablet by mouth daily 30 tablet 3    mirabegron (MYRBETRIQ) 25 MG TB24 Take 1 tablet by mouth daily 30 tablet 2    loperamide (IMODIUM) 2 MG capsule Take 1 capsule by mouth 4 times daily as needed for Diarrhea 90 capsule 1     No current facility-administered medications for this visit. Orders Placed This Encounter   Medications    diclofenac sodium (VOLTAREN) 1 % GEL     Sig: Apply 4 g topically 4 times daily     Dispense:  100 g     Refill:  1    meloxicam (MOBIC) 7.5 MG tablet     Sig: Take 1 tablet by mouth daily     Dispense:  30 tablet     Refill:  3    mirabegron (MYRBETRIQ) 25 MG TB24     Sig: Take 1 tablet by mouth daily     Dispense:  30 tablet     Refill:  2         All medications reviewed and reconciled, including OTC and herbal medications. Updated list given to patient.        Patient Active Problem List   Diagnosis    ANKUR (stress urinary incontinence, female)    History of lithotripsy    Renal calculus    KATI (generalized anxiety disorder)    Moderate episode of recurrent major depressive disorder (HCC)    Recurrent UTI    Ureteral stricture    Prediabetes    Primary osteoarthritis of both knees    Irritable bowel syndrome with diarrhea    Cervical radiculopathy    Cervical spinal stenosis    Primary osteoarthritis involving multiple joints    Vitamin D deficiency       Past Medical History:   Diagnosis Date    Depression     KATI (generalized anxiety disorder)     GERD (gastroesophageal reflux disease)     Kidney stone     Prediabetes 10/18/2019       Past Surgical History:   Procedure Laterality Date    CARPAL TUNNEL RELEASE Right 2020    CERVICAL FUSION N/A 2/23/2022    ACDF C3-C7 performed by Darya Ahuja MD at [facility]

## 2023-08-14 ENCOUNTER — TELEPHONE (OUTPATIENT)
Dept: FAMILY MEDICINE CLINIC | Age: 58
End: 2023-08-14

## 2023-08-14 DIAGNOSIS — U07.1 COVID-19 VIRUS INFECTION: ICD-10-CM

## 2023-08-14 DIAGNOSIS — E55.9 VITAMIN D DEFICIENCY: Primary | ICD-10-CM

## 2023-08-14 NOTE — TELEPHONE ENCOUNTER
----- Message from OPAL Concepcion - CNP sent at 8/14/2023  8:05 AM EDT -----  Let Bernice  know her knee xray does show worsening degenerative arthritis type changes. Her labs all look ok except her vitamin D is quite low at 10. It should be 30 or above. I am sending in a Rx for vitamin D replacement.

## 2023-08-14 NOTE — TELEPHONE ENCOUNTER
I called and spoke to [facility] and she verbalized understanding and had no questions at this time.

## 2023-10-30 DIAGNOSIS — N32.81 OAB (OVERACTIVE BLADDER): ICD-10-CM

## 2023-10-30 RX ORDER — MIRABEGRON 25 MG/1
25 TABLET, FILM COATED, EXTENDED RELEASE ORAL DAILY
Qty: 90 TABLET | Refills: 0 | Status: SHIPPED | OUTPATIENT
Start: 2023-10-30

## 2023-10-30 NOTE — TELEPHONE ENCOUNTER
Recent Visits  Date Type Provider Dept   08/11/23 Office Visit OPAL Campbell - CNP [facility]   Showing recent visits within past 540 days with a meds authorizing provider and meeting all other requirements  Future Appointments  No visits were found meeting these conditions.   Showing future appointments within next 150 days with a meds authorizing provider and meeting all other requirements Rhofade Counseling: Rhofade is a topical medication which can decrease superficial blood flow where applied. Side effects are uncommon and include stinging, redness and allergic reactions.

## 2023-11-06 DIAGNOSIS — K58.0 IRRITABLE BOWEL SYNDROME WITH DIARRHEA: ICD-10-CM

## 2023-11-06 RX ORDER — LOPERAMIDE HYDROCHLORIDE 2 MG/1
2 CAPSULE ORAL 4 TIMES DAILY PRN
Qty: 90 CAPSULE | Refills: 1 | Status: SHIPPED | OUTPATIENT
Start: 2023-11-06

## 2023-11-20 ENCOUNTER — TELEPHONE (OUTPATIENT)
Dept: FAMILY MEDICINE CLINIC | Age: 58
End: 2023-11-20

## 2023-11-20 DIAGNOSIS — M17.11 PRIMARY OSTEOARTHRITIS OF RIGHT KNEE: Primary | ICD-10-CM

## 2023-11-20 DIAGNOSIS — M15.9 PRIMARY OSTEOARTHRITIS INVOLVING MULTIPLE JOINTS: ICD-10-CM

## 2023-11-20 NOTE — TELEPHONE ENCOUNTER
Pt would like to know if she can get a handicap placard due to the pain in her leg.      Please call pt when it is ready, she would like to pick it up

## 2023-11-27 DIAGNOSIS — M15.9 PRIMARY OSTEOARTHRITIS INVOLVING MULTIPLE JOINTS: ICD-10-CM

## 2023-11-27 DIAGNOSIS — M17.11 PRIMARY OSTEOARTHRITIS OF RIGHT KNEE: ICD-10-CM

## 2023-11-27 RX ORDER — MELOXICAM 7.5 MG/1
7.5 TABLET ORAL DAILY
Qty: 90 TABLET | Refills: 1 | Status: SHIPPED | OUTPATIENT
Start: 2023-11-27

## 2023-11-27 NOTE — TELEPHONE ENCOUNTER
Recent Visits  Date Type Provider Dept   08/11/23 Office Visit Kavin Brothers, APRN - CNP [facility]   Showing recent visits within past 540 days with a meds authorizing provider and meeting all other requirements  Future Appointments  No visits were found meeting these conditions.  Showing future appointments within next 150 days with a meds authorizing provider and meeting all other requirements

## 2023-12-19 NOTE — OP NOTE
[address] [id number]                                OPERATIVE REPORT    PATIENT NAME: JT MASON                   :        1965  MED REC NO:   [id number]                           ROOM:  ACCOUNT NO:   [de-identified]                           ADMIT DATE: 2022  PROVIDER:     Deepak Cordoba. Ila Daily M.D.    [place] Rebel:  2022    PREOPERATIVE DIAGNOSES:  1. Cervical spondylosis and stenosis of levels of C3 to levels of C7.  2.  Cervical myelopathy. 3.  Cervical radiculopathy both upper extremities with loss of strength      and loss of sensation. POSTOPERATIVE DIAGNOSES:  1. Cervical spondylosis and stenosis at levels of C3 to levels of C7.  2.  Cervical myelopathy. 3.  Cervical radiculopathy both upper extremities with loss of strength      and loss of sensation. PROCEDURES:  1. Anterior cervical diskectomies at levels of C3-C4, C4-C5, C5-C6 and      C6-C7 with a complete uncus decompression and clearance of canal for      each of these four levels of diskectomy. 2.  Anterior cervical interbody fusion at levels of C3-C4, C4-C5, C5-C6      and C6-C7. 3.  Use of allograft bone graft, tricortical and structural, Surgalign      tricortical bone grafting for these four levels of fusion. 4.  Use of the allograft bone graft DBM Gel by Jocelyn Rosen, this was a      FiberX bone graft from a 2 mL kit filling the central portion of each of      these four cortical grafts. 5.  Plating of cervical spine levels of C3 to levels of C7 with use of a      62 mm SeaSpine Admiral plate which was attached to the bone with a total      of 10 screws. SURGEON:  Deepak Cordoba. MD Sarah    ASSISTANT:  Ric Patterson, physician assist certified. ANESTHESIA:  General.    BLOOD LOSS:  30 mL. DRAIN:  Joanna Apolinar. COMPLICATION:  Zero.     INDICATION:  The patient presents, 60years of age, having symptoms of a  neck pain and bilateral arm radiculopathic pain and loss of strength and  dexterity. Symptoms have been ongoing now for several months, worsening  actually over the course of two years, but much more severe in the  recent months. She was having loss of handwriting dexterity. She was  dropping objects as well and loosing balance and because of this, seeks  definitive care. I have reviewed MRIs with her that showed severe and  multilevel cervical spinal canal stenosis at the levels of C3 to levels  of C7 with significant spinal cord deformation at each of these four  levels. I have advised the patient to consider surgery for relief of  this neurocompression, multilevel cervical spine diskectomy and fusion  operation, which I discussed with her in detail. All questions  regarding the operation were discussed. Questions were answered and on  today's date she was agreeable to proceed with the operation which we  have outlined. She was also seen by Santiago Brink, nurse practitioner,  in preparation for surgery for medical clearance. DESCRIPTION OF PROCEDURE:  We brought the patient to the operating room,  where upon entry timeout would be observed. Anesthetic was delivered  and her airway being well secured. She would have her arms tucked to  the side and neck gently extended with a chest roll upon the shoulders  and would receive intravenous Ancef 2 gm to completion. We would  thoroughly prep and drape the cervical spine with the use of a soap  scrubbed solution, sterile toweling, and a ChloraPrep solution. We  applied sterile sheets, Camarena Coil as well and marked the skin midline to  leftward at curvilinear manner and inject a total of 3 mL of 1%  lidocaine with epinephrine. Skin would then be incised. Hemostasis  maintained. I would expose the cervical spine through the anterior  approach. Skin was divided. Hemostasis maintained. Platysmal layer  divided and we would divide the pretracheal fascia.   The omohyoid muscle  would also be sectioned to gently retract upon the esophagus, trachea  rightward direction, and take the SCM and carotid sheath leftward. We  placed the pin at the level of C4-C5 and would expose the cervical spine  placing a pin, taking x-rays to confirm levels of surgery. With levels  confirmed, I then removed the marking pin and began by placing Benedict  pins and blade retraction at the level of C3-C4 to then bring the  microscope and gently distract across the C3-C4 disk space and  ultimately performed a complete anterior cervical diskectomy. With the  canal thoroughly exposed and decompressed, endplates were well  decorticated and we removed the posterior osteophytes and disk  herniation as well for complete clearance of the canal and also taken  down the PLL. The ball-tipped probe could then pass well through each  of these opening foramina and the interbody surface well decorticated  for fusion. This interbody surface would accept a 6 x 11 mm graft by  the SurgRevolymer bone bank. We filled this with the use of a Surgalign 2  mL kit of FiberX and the graft fit very nicely within the space, so we  could then move the C3 pin at the level of C5 and similarly distract  level of C4-C5, maintained distraction of disk space, maintained  protection of soft tissues with use of small Gardiner retractors and  use a similar technique with curettage, jose and Kerrison rongeurs use  for a complete decompression. The PLL was taken as well and within the  interbody surface well prepared for fusion and place another 6 mm graft  by Surgalign. It would measure 14 x 11 mm of height with dimension. We  also filled this graft with use of the FiberX and moved the C4 pin onto  the level of C6 and similarly distracted level of C5-C6, maintain  protection of soft tissue, performed a complete diskectomy and removed  all posterior osteophytes in a similar manner with use of small Kerrison  rongeurs.   The PLL was taken as well and the ball-tipped probe would  then pass well through each of the opening foramina. Within this  interbody space of C5-C6, a graft was chosen another of these 6 x 14 x  11 mm size grafts by Surgalign, followed with the use of the DBM bone  FiberX. We tamped the graft into place and then moved the C5 pin onto  the level of C7. Then, distract level of C6-C7 similarly and again  performed a cervical diskectomy at the level of C6-C7 with complete  clearance of the canal, disk space removal, endplates well decorticated  and all posterior osteophytes removed, where a ball-tipped probe then  passing well through each of the opening foramina. This level also  accepts a 6 x 14 x 11 mm graft by Surgalign. We filled this with use of  the DBM by Surgalign. We tamped this into place and relaxed the  distraction pins and would go on to the plate levels of R2-Z6 levels  with a 62 mm SeaSpine Admiral plate, which was bent in lordosis to then  attach to levels of C3 through C7 using two screws per level totalling  10. Each locking mechanism would be engaged. All screws measured 14 mm  at length and the fit of this plate was very good. There was no soft  tissue entrapment or bleeding. We ensured this with thorough  inspection. Placed the drain, closed in layers, finished with the  dressing application and then also reviewed x-rays in the room. Drain  tube was connected. X-rays reviewed and we would apply the Aspen collar  and then take the patient back to recovery post extubation without  complication or difficulty. My first assistant was also Sharla Kruger,  physician assist certified. Sharla Kruger PA-C, assisted throughout the procedure with positioning,  draping, retraction, wound closure, dressing, and splint application.         Deandre Raymundo M.D.    D: 02/23/2022 12:14:27       T: 02/23/2022 14:31:51     [id number]  Job#: [id number]     Doc#: [id number]    CC: No

## 2023-12-20 ENCOUNTER — HOSPITAL ENCOUNTER (EMERGENCY)
Age: 58
Discharge: HOME | End: 2023-12-20
Payer: COMMERCIAL

## 2023-12-20 VITALS
SYSTOLIC BLOOD PRESSURE: 152 MMHG | TEMPERATURE: 98 F | WEIGHT: 175 LBS | BODY MASS INDEX: 31 KG/M2 | DIASTOLIC BLOOD PRESSURE: 89 MMHG | HEART RATE: 61 BPM | RESPIRATION RATE: 16 BRPM | OXYGEN SATURATION: 100 %

## 2023-12-20 DIAGNOSIS — M94.0 COSTOCHONDRITIS, ACUTE: Primary | ICD-10-CM

## 2023-12-20 DIAGNOSIS — J40 BRONCHITIS: ICD-10-CM

## 2023-12-20 PROCEDURE — 93010 ELECTROCARDIOGRAM REPORT: CPT | Performed by: NUCLEAR MEDICINE

## 2023-12-20 PROCEDURE — 93005 ELECTROCARDIOGRAM TRACING: CPT

## 2023-12-20 PROCEDURE — 99213 OFFICE O/P EST LOW 20 MIN: CPT

## 2023-12-20 RX ORDER — AMOXICILLIN AND CLAVULANATE POTASSIUM 875; 125 MG/1; MG/1
1 TABLET, FILM COATED ORAL 2 TIMES DAILY
Qty: 20 TABLET | Refills: 0 | Status: SHIPPED | OUTPATIENT
Start: 2023-12-20 | End: 2023-12-30

## 2023-12-20 RX ORDER — PREDNISONE 20 MG/1
20 TABLET ORAL DAILY
Qty: 7 TABLET | Refills: 0 | Status: SHIPPED | OUTPATIENT
Start: 2023-12-20 | End: 2023-12-27

## 2023-12-20 RX ORDER — ALBUTEROL SULFATE 90 UG/1
2 AEROSOL, METERED RESPIRATORY (INHALATION) 4 TIMES DAILY PRN
Qty: 18 G | Refills: 0 | Status: SHIPPED | OUTPATIENT
Start: 2023-12-20

## 2023-12-20 RX ORDER — BENZONATATE 200 MG/1
200 CAPSULE ORAL 3 TIMES DAILY PRN
Qty: 30 CAPSULE | Refills: 0 | Status: SHIPPED | OUTPATIENT
Start: 2023-12-20 | End: 2023-12-27

## 2023-12-20 ASSESSMENT — PAIN DESCRIPTION - LOCATION: LOCATION: CHEST

## 2023-12-20 ASSESSMENT — PAIN DESCRIPTION - PAIN TYPE: TYPE: ACUTE PAIN

## 2023-12-20 ASSESSMENT — PAIN - FUNCTIONAL ASSESSMENT
PAIN_FUNCTIONAL_ASSESSMENT: 0-10
PAIN_FUNCTIONAL_ASSESSMENT: PREVENTS OR INTERFERES SOME ACTIVE ACTIVITIES AND ADLS

## 2023-12-20 ASSESSMENT — PAIN SCALES - GENERAL: PAINLEVEL_OUTOF10: 7

## 2023-12-20 ASSESSMENT — PAIN DESCRIPTION - FREQUENCY: FREQUENCY: CONTINUOUS

## 2023-12-20 ASSESSMENT — PAIN DESCRIPTION - DESCRIPTORS: DESCRIPTORS: ACHING;PRESSURE;SORE

## 2023-12-20 ASSESSMENT — PAIN DESCRIPTION - ONSET: ONSET: PROGRESSIVE

## 2023-12-20 NOTE — DISCHARGE INSTRUCTIONS
Provided a prescription for prednisone, Tessalon Perles, Augmentin, and Albuterol inhaler. Instructed use over-the-counter Tylenol and Motrin for pain or fever. Instructed follow-up with PCP in 3 to 5 days worsening symptom. Present to the emergency department for severe shortness of breath, or severe chest pain.

## 2023-12-20 NOTE — ED TRIAGE NOTES
Pt brought to room 5 after she went to the bathroom from the  with c/o SOB and chest pain with breathing and coughing. She reports that she has had a cough for a week or 2 but that last night the chest pain started.

## 2023-12-20 NOTE — ED PROVIDER NOTES
[facility]  Urgent Care Encounter      CHIEF COMPLAINT       Chief Complaint   Patient presents with    Cough    Chest Pain       Nurses Notes reviewed and I agree except as noted in the HPI. HISTORY OF PRESENT ILLNESS   Andi Ray is a 59 y.o. female who presents urgent care for evaluation of cough and chest pain. Patient reports onset of cough greater than 2 weeks ago. Cough is persistent, productive at times. Reports chest pain started yesterday. Chest pain occurs when coughing. Chest pain tenderness to sternum area. Patient denies any treatment of symptoms. Patient does endorse in some fatigue, congestion, rhinitis, and and some loss of voice. Denies any fevers, chills. Endorses an some shortness of breath with cough. REVIEW OF SYSTEMS     Review of Systems   Constitutional:  Positive for fatigue. Negative for chills, diaphoresis and fever. HENT:  Positive for congestion, rhinorrhea and voice change. Negative for ear pain, sore throat and trouble swallowing. Eyes:  Negative for discharge and redness. Respiratory:  Positive for cough and shortness of breath. Cardiovascular:  Positive for chest pain. Gastrointestinal:  Negative for diarrhea, nausea and vomiting. Genitourinary:  Negative for decreased urine volume. Musculoskeletal:  Negative for neck pain and neck stiffness. Skin:  Negative for rash. Neurological:  Negative for headaches. Hematological:  Negative for adenopathy. Psychiatric/Behavioral:  Negative for sleep disturbance. PAST MEDICAL HISTORY         Diagnosis Date    Depression     KATI (generalized anxiety disorder)     GERD (gastroesophageal reflux disease)     Kidney stone     Prediabetes 10/18/2019       SURGICAL HISTORY     Patient  has a past surgical history that includes Cholecystectomy; Kidney stone surgery;  Colonoscopy; Kidney stone surgery (05/13/2015); knee surgery (Right, 2008); cysto/uretero/pyeloscopy, calculus tx   amoxicillin-clavulanate (AUGMENTIN) 875-125 MG per tablet Take 1 tablet by mouth 2 times daily for 10 days, Disp-20 tablet, R-0Normal      predniSONE (DELTASONE) 20 MG tablet Take 1 tablet by mouth daily for 7 days, Disp-7 tablet, R-0Normal      benzonatate (TESSALON) 200 MG capsule Take 1 capsule by mouth 3 times daily as needed for Cough, Disp-30 capsule, R-0Normal      albuterol sulfate HFA (VENTOLIN HFA) 108 (90 Base) MCG/ACT inhaler Inhale 2 puffs into the lungs 4 times daily as needed for Wheezing, Disp-18 g, R-0Normal           Discharge Medication List as of 12/20/2023 10:49 AM          OPAL Galan CNP, Olivia, APRN - CNP  12/20/23 1056       Manju Chawla APRN - CNP  12/20/23 1056

## 2023-12-23 LAB
EKG ATRIAL RATE: 61 BPM
EKG P AXIS: 31 DEGREES
EKG P-R INTERVAL: 182 MS
EKG Q-T INTERVAL: 438 MS
EKG QRS DURATION: 90 MS
EKG QTC CALCULATION (BAZETT): 440 MS
EKG R AXIS: 5 DEGREES
EKG T AXIS: 39 DEGREES
EKG VENTRICULAR RATE: 61 BPM

## 2023-12-27 ENCOUNTER — HOSPITAL ENCOUNTER (OUTPATIENT)
Dept: WOMENS IMAGING | Age: 58
Discharge: HOME | End: 2023-12-27
Payer: COMMERCIAL

## 2023-12-27 DIAGNOSIS — Z12.31 VISIT FOR SCREENING MAMMOGRAM: ICD-10-CM

## 2023-12-27 PROCEDURE — 77063 BREAST TOMOSYNTHESIS BI: CPT

## 2024-01-02 ENCOUNTER — TELEPHONE (OUTPATIENT)
Dept: FAMILY MEDICINE CLINIC | Age: 59
End: 2024-01-02

## 2024-01-02 DIAGNOSIS — M62.838 MUSCLE SPASMS OF BOTH LOWER EXTREMITIES: Primary | ICD-10-CM

## 2024-01-02 RX ORDER — TIZANIDINE 4 MG/1
4 TABLET ORAL EVERY 8 HOURS PRN
Qty: 30 TABLET | Refills: 0 | Status: SHIPPED | OUTPATIENT
Start: 2024-01-02

## 2024-01-02 NOTE — TELEPHONE ENCOUNTER
Pt called in and stated that she started having muscle spasms in her legs a couple weeks ago.     Would like to know if there is something that she can take for the spasms.     [facility]

## 2024-01-29 DIAGNOSIS — M17.11 PRIMARY OSTEOARTHRITIS OF RIGHT KNEE: ICD-10-CM

## 2024-01-30 DIAGNOSIS — N32.81 OAB (OVERACTIVE BLADDER): ICD-10-CM

## 2024-01-30 RX ORDER — MIRABEGRON 25 MG/1
25 TABLET, FILM COATED, EXTENDED RELEASE ORAL DAILY
Qty: 90 TABLET | Refills: 1 | Status: SHIPPED | OUTPATIENT
Start: 2024-01-30

## 2024-03-15 ENCOUNTER — HOSPITAL ENCOUNTER (EMERGENCY)
Age: 59
Discharge: HOME | End: 2024-03-15
Payer: COMMERCIAL

## 2024-03-15 VITALS
HEART RATE: 84 BPM | RESPIRATION RATE: 18 BRPM | DIASTOLIC BLOOD PRESSURE: 84 MMHG | OXYGEN SATURATION: 98 % | TEMPERATURE: 97.6 F | SYSTOLIC BLOOD PRESSURE: 176 MMHG

## 2024-03-15 DIAGNOSIS — K02.9 DENTAL DECAY: ICD-10-CM

## 2024-03-15 DIAGNOSIS — K04.7 DENTAL ABSCESS: Primary | ICD-10-CM

## 2024-03-15 PROCEDURE — 6360000002 HC RX W HCPCS: Performed by: NURSE PRACTITIONER

## 2024-03-15 PROCEDURE — 96372 THER/PROPH/DIAG INJ SC/IM: CPT

## 2024-03-15 PROCEDURE — 2500000003 HC RX 250 WO HCPCS: Performed by: NURSE PRACTITIONER

## 2024-03-15 PROCEDURE — 6370000000 HC RX 637 (ALT 250 FOR IP): Performed by: NURSE PRACTITIONER

## 2024-03-15 PROCEDURE — 99213 OFFICE O/P EST LOW 20 MIN: CPT | Performed by: NURSE PRACTITIONER

## 2024-03-15 PROCEDURE — 99213 OFFICE O/P EST LOW 20 MIN: CPT

## 2024-03-15 RX ORDER — CHLORHEXIDINE GLUCONATE ORAL RINSE 1.2 MG/ML
15 SOLUTION DENTAL 2 TIMES DAILY
Qty: 420 ML | Refills: 0 | Status: SHIPPED | OUTPATIENT
Start: 2024-03-15 | End: 2024-03-29

## 2024-03-15 RX ORDER — LIDOCAINE HYDROCHLORIDE 20 MG/ML
15 SOLUTION OROPHARYNGEAL ONCE
Status: COMPLETED | OUTPATIENT
Start: 2024-03-15 | End: 2024-03-15

## 2024-03-15 RX ORDER — IBUPROFEN 400 MG/1
400 TABLET ORAL EVERY 6 HOURS PRN
Qty: 120 TABLET | Refills: 0 | Status: SHIPPED | OUTPATIENT
Start: 2024-03-15 | End: 2024-03-15

## 2024-03-15 RX ORDER — IBUPROFEN 400 MG/1
400 TABLET ORAL EVERY 6 HOURS PRN
Qty: 12 TABLET | Refills: 0 | Status: SHIPPED | OUTPATIENT
Start: 2024-03-15 | End: 2024-03-18

## 2024-03-15 RX ADMIN — Medication 15 ML: at 11:54

## 2024-03-15 RX ADMIN — LIDOCAINE HYDROCHLORIDE 1000 MG: 10 INJECTION, SOLUTION EPIDURAL; INFILTRATION; INTRACAUDAL; PERINEURAL at 12:17

## 2024-03-15 ASSESSMENT — ENCOUNTER SYMPTOMS
COLOR CHANGE: 0
PHOTOPHOBIA: 0
EYE REDNESS: 0
EYE DISCHARGE: 0
WHEEZING: 0
SINUS PRESSURE: 0
EYE ITCHING: 0
FACIAL SWELLING: 1
CHOKING: 0
TRISMUS: 0
EYE PAIN: 0
TROUBLE SWALLOWING: 0
APNEA: 0
RHINORRHEA: 0
SINUS PAIN: 0
CHEST TIGHTNESS: 0
SORE THROAT: 0
SHORTNESS OF BREATH: 0
VOICE CHANGE: 0
FACIAL SWELLING: 0
STRIDOR: 0
COUGH: 0

## 2024-03-15 ASSESSMENT — PAIN SCALES - GENERAL: PAINLEVEL_OUTOF10: 10

## 2024-03-15 ASSESSMENT — PAIN DESCRIPTION - LOCATION: LOCATION: TEETH

## 2024-03-15 ASSESSMENT — PAIN DESCRIPTION - FREQUENCY: FREQUENCY: CONTINUOUS

## 2024-03-15 ASSESSMENT — PAIN DESCRIPTION - ORIENTATION: ORIENTATION: RIGHT;UPPER

## 2024-03-15 ASSESSMENT — PAIN - FUNCTIONAL ASSESSMENT
PAIN_FUNCTIONAL_ASSESSMENT: 0-10
PAIN_FUNCTIONAL_ASSESSMENT: ACTIVITIES ARE NOT PREVENTED

## 2024-03-15 ASSESSMENT — PAIN DESCRIPTION - DESCRIPTORS: DESCRIPTORS: PRESSURE;THROBBING

## 2024-03-15 ASSESSMENT — PAIN DESCRIPTION - PAIN TYPE: TYPE: ACUTE PAIN

## 2024-03-15 NOTE — DISCHARGE INSTRUCTIONS
The patient was also advised to eat a soft diet, chew on the opposite side of pain, Use a soft bristle toothbrush and warm saltwater lavage after eating.  The patient was advised to take the medication as directed.  The patient was also advised to follow-up with a local dentist ASAP.    We also discussed returning to the Emergency Department immediately if new or worsening symptoms occur. We have discussed the symptoms which are most concerning that necessitate immediate return.

## 2024-03-15 NOTE — ED PROVIDER NOTES
murmur heard.     No friction rub. No gallop.   Pulmonary:      Effort: No respiratory distress.      Breath sounds: No stridor. No wheezing, rhonchi or rales.   Chest:      Chest wall: No tenderness.   Musculoskeletal:         General: No swelling, tenderness, deformity or signs of injury.      Cervical back: No rigidity or tenderness.      Right lower leg: No edema.      Left lower leg: No edema.   Lymphadenopathy:      Cervical: No cervical adenopathy.   Skin:     Coloration: Skin is not jaundiced or pale.      Findings: No bruising, erythema, lesion or rash.   Neurological:      General: No focal deficit present.      Mental Status: She is alert and oriented to person, place, and time.      Cranial Nerves: No cranial nerve deficit.      Sensory: No sensory deficit.      Motor: No weakness.      Coordination: Coordination normal.      Gait: Gait normal.      Deep Tendon Reflexes: Reflexes normal.   Psychiatric:         Mood and Affect: Mood normal.         Behavior: Behavior normal.         Thought Content: Thought content normal.         Judgment: Judgment normal.         DIAGNOSTIC RESULTS   Labs:No results found for this visit on 03/15/24.    IMAGING:  No orders to display     URGENT CARE COURSE:     Vitals:    03/15/24 1145   BP: (!) 176/84   Pulse: 84   Resp: 18   Temp: 97.6 °F (36.4 °C)   TempSrc: Tympanic   SpO2: 98%       Medications   lidocaine viscous hcl (XYLOCAINE) 2 % solution 15 mL (15 mLs Mouth/Throat Given 3/15/24 1154)   cefTRIAXone (ROCEPHIN) 1,000 mg in lidocaine 1 % 2.86 mL IM Injection (1,000 mg IntraMUSCular Given 3/15/24 1217)     PROCEDURES:  None  FINAL IMPRESSION      1. Dental abscess    2. Dental decay        DISPOSITION/PLAN   Discharge - Pending Orders Complete    REFERRED TO:  Kavin Brothers, APRN - CNP  [address] [place] [address]  [phone]      As needed    DISCHARGE MEDICATIONS:  New Prescriptions    CHLORHEXIDINE (PERIDEX) 0.12 % SOLUTION    Swish and spit 15 mLs 2 times  daily for 14 days    IBUPROFEN (IBU) 400 MG TABLET    Take 1 tablet by mouth every 6 hours as needed for Pain     Current Discharge Medication List          Makenzie Adams

## 2024-03-15 NOTE — ED PROVIDER NOTES
[facility] URGENT CARE  Urgent Care Encounter      CHIEF COMPLAINT       Chief Complaint   Patient presents with    Dental Problem     Top right         Nurses Notes reviewed and I agree except as noted in the HPI.  HISTORY OFPRESENT ILLNESS   Christen Parker is a 58 y.o.  The history is provided by the patient. No  was used.   Dental Pain  Location:  Upper  Upper teeth location:  6/RU cuspid  Quality:  Throbbing, aching, localized and pressure-like  Severity:  Severe  Onset quality:  Sudden  Duration:  2 days  Timing:  Constant  Progression:  Worsening  Chronicity:  New  Context: abscess and poor dentition    Context: cap still on, not crown fracture, not dental caries, not dental fracture, not enamel fracture, filling intact, not intrusion, not malocclusion, not recent dental surgery and not trauma    Relieved by:  Nothing  Worsened by:  Cold food/drink, hot food/drink, touching, jaw movement and pressure  Ineffective treatments:  Acetaminophen  Associated symptoms: facial pain, facial swelling and gum swelling    Associated symptoms: no congestion, no difficulty swallowing, no drooling, no fever, no headaches, no neck pain, no neck swelling, no oral bleeding, no oral lesions and no trismus    Risk factors: lack of dental care    Risk factors: no alcohol problem, no cancer, no chewing tobacco use, no diabetes, no immunosuppression, no periodontal disease and no smoking        REVIEW OF SYSTEMS     Review of Systems   Constitutional:  Positive for activity change and appetite change. Negative for chills, diaphoresis, fatigue and fever.   HENT:  Positive for dental problem and facial swelling. Negative for congestion, drooling and mouth sores.    Respiratory:  Negative for apnea, cough, choking, chest tightness, shortness of breath, wheezing and stridor.    Cardiovascular:  Negative for chest pain, palpitations and leg swelling.   Musculoskeletal:  Negative for neck pain.  orders to display     URGENT CARE COURSE:     Vitals:    03/15/24 1145   BP: (!) 176/84   Pulse: 84   Resp: 18   Temp: 97.6 °F (36.4 °C)   TempSrc: Tympanic   SpO2: 98%       Medications   lidocaine viscous hcl (XYLOCAINE) 2 % solution 15 mL (15 mLs Mouth/Throat Given 3/15/24 1154)   cefTRIAXone (ROCEPHIN) 1,000 mg in lidocaine 1 % 2.86 mL IM Injection (1,000 mg IntraMUSCular Given 3/15/24 1217)     PROCEDURES:  None  FINAL IMPRESSION      1. Dental abscess    2. Dental decay        DISPOSITION/PLAN   Decision To Discharge         The patient was also advised to eat a soft diet, chew on the opposite side of pain, Use a soft bristle toothbrush and warm saltwater lavage after eating.  The patient was advised to take the medication as directed.  The patient was also advised to follow-up with a local dentist ASAP.    We also discussed returning to the Emergency Department immediately if new or worsening symptoms occur. We have discussed the symptoms which are most concerning that necessitate immediate return.          REFERRED TO:  Kavin Brothers, OPAL - CNP  [address] [place] [address]  [phone]      As needed    DISCHARGE MEDICATIONS:  Discharge Medication List as of 3/15/2024 12:36 PM        START taking these medications    Details   chlorhexidine (PERIDEX) 0.12 % solution Swish and spit 15 mLs 2 times daily for 14 days, Disp-420 mL, R-0Normal           Discharge Medication List as of 3/15/2024 12:36 PM        CONTINUE these medications which have CHANGED    Details   ibuprofen (IBU) 400 MG tablet Take 1 tablet by mouth every 6 hours as needed for Pain, Disp-12 tablet, R-0Normal             OPAL Villa CNP, Tawnya Rae, APRN - CNP  03/15/24 [id number]

## 2024-03-15 NOTE — ED NOTES
Patient presents to [facility] with spouse with complaints of right upper dental pain due to a chipped tooth. Patient does have obvious facial swelling on the right side.      Tamara Grant, RN  03/15/24 1871

## 2024-04-27 DIAGNOSIS — N32.81 OAB (OVERACTIVE BLADDER): ICD-10-CM

## 2024-04-29 RX ORDER — MIRABEGRON 25 MG/1
25 TABLET, FILM COATED, EXTENDED RELEASE ORAL DAILY
Qty: 90 TABLET | Refills: 1 | Status: SHIPPED | OUTPATIENT
Start: 2024-04-29

## 2024-07-02 ENCOUNTER — TELEPHONE (OUTPATIENT)
Dept: FAMILY MEDICINE CLINIC | Age: 59
End: 2024-07-02

## 2024-07-02 DIAGNOSIS — M17.11 PRIMARY OSTEOARTHRITIS OF RIGHT KNEE: ICD-10-CM

## 2024-07-02 DIAGNOSIS — M15.9 PRIMARY OSTEOARTHRITIS INVOLVING MULTIPLE JOINTS: ICD-10-CM

## 2024-07-02 RX ORDER — MELOXICAM 15 MG/1
15 TABLET ORAL DAILY
Qty: 90 TABLET | Refills: 1 | Status: SHIPPED | OUTPATIENT
Start: 2024-07-02

## 2024-07-02 NOTE — TELEPHONE ENCOUNTER
Left message on answering machine. Requested pt to call back  at their earliest convenience.   Informed her about the medication.

## 2024-07-02 NOTE — TELEPHONE ENCOUNTER
I sent a Rx for the 15 mg dose of Mobic. She can take 2 of the 7.5 mg tablets to use up her current supply if she wants.

## 2024-07-23 NOTE — PATIENT INSTRUCTIONS
Patient Education        Learning About How to Prepare for Surgery  How can you prepare before surgery? You can do some things that will help you safely prepare for surgery. · Understand exactly what surgery is planned. You should know the risks, benefits, and other options. · Tell your doctors ALL the medicines, vitamins, supplements, and herbal remedies you take. Some of these can increase the risk of bleeding. Or they may interact with anesthesia. · Follow your doctor's instructions about which medicines to take or stop before your surgery. ? You may need to stop taking some medicines a week or more before surgery. ? If you take aspirin or some other blood thinner, be sure to talk to your doctor. · Follow any other instructions your doctor gave you. · If you have an advance directive, let your doctor know, and bring a copy to the hospital.   It may include a living will and a durable power of  for health care. It lets your doctor and loved ones know your health care wishes. If you don't have one, you may want to prepare one. How can you prepare on the day of surgery? Here are some tips about what to do at home before you leave for your surgery. · If your doctor told you to take your medicines on the day of surgery, take them with only a sip of water. · Follow the instructions about when to stop eating and drinking. If you don't, your surgery may be canceled. · Follow your doctor's instructions about when to bathe or shower before your surgery. · Do not shave the surgical site yourself. · Take off all jewelry and piercings. · Take out contact lenses, if you wear them. · Have a picture ID ready to take with you. Your ID will be checked before your surgery. · Know when to call your doctor. Call your doctor if you:  ? Become ill before surgery. ? Need to reschedule. ? Have changed your mind about having the surgery. What happens before surgery?   Here are some things you can expect to happen before your surgery. · Your picture ID will be checked. · The area of your body that needs surgery is often marked to make sure there are no errors. · You will be kept comfortable and safe by your anesthesia provider. The anesthesia may make you sleep. Or it may just numb the area being worked on. What happens when you are ready to go home? Be sure you have someone drive you home. Anesthesia and pain medicine make it unsafe for you to drive. You will get instructions about recovering from your surgery. This is called a discharge plan. It will cover things like diet, wound care, follow-up care, driving, and getting back to your normal routine. Follow-up care is a key part of your treatment and safety. Be sure to make and go to all appointments, and call your doctor if you are having problems. It's also a good idea to know your test results and keep a list of the medicines you take. Where can you learn more? Go to https://[facility].[employer]. org and sign in to your [employer] account. Enter Q270 in the [facility] box to learn more about \"Learning About How to Prepare for Surgery. \"     If you do not have an account, please click on the \"Sign Up Now\" link. Current as of: October 6, 2021               Content Version: 13.1  © 3141-3536 Healthwise, Incorporated. Care instructions adapted under license by [facility] ([facility]). If you have questions about a medical condition or this instruction, always ask your healthcare professional. [place] [address] any warranty or liability for your use of this information. [phone]

## 2024-09-09 DIAGNOSIS — E55.9 VITAMIN D DEFICIENCY: ICD-10-CM

## 2024-09-13 ENCOUNTER — HOSPITAL ENCOUNTER (OUTPATIENT)
Age: 59
Discharge: HOME | End: 2024-09-13
Payer: COMMERCIAL

## 2024-09-13 ENCOUNTER — OFFICE VISIT (OUTPATIENT)
Dept: FAMILY MEDICINE CLINIC | Age: 59
End: 2024-09-13
Payer: COMMERCIAL

## 2024-09-13 VITALS
HEART RATE: 54 BPM | HEIGHT: 63 IN | BODY MASS INDEX: 31.5 KG/M2 | OXYGEN SATURATION: 96 % | TEMPERATURE: 98.1 F | SYSTOLIC BLOOD PRESSURE: 125 MMHG | WEIGHT: 177.8 LBS | RESPIRATION RATE: 16 BRPM | DIASTOLIC BLOOD PRESSURE: 75 MMHG

## 2024-09-13 DIAGNOSIS — E55.9 VITAMIN D DEFICIENCY: ICD-10-CM

## 2024-09-13 DIAGNOSIS — R73.03 PREDIABETES: ICD-10-CM

## 2024-09-13 DIAGNOSIS — M15.9 PRIMARY OSTEOARTHRITIS INVOLVING MULTIPLE JOINTS: ICD-10-CM

## 2024-09-13 DIAGNOSIS — M79.18 CHRONIC MUSCULOSKELETAL PAIN: ICD-10-CM

## 2024-09-13 DIAGNOSIS — N32.81 OAB (OVERACTIVE BLADDER): ICD-10-CM

## 2024-09-13 DIAGNOSIS — G89.29 CHRONIC MUSCULOSKELETAL PAIN: ICD-10-CM

## 2024-09-13 DIAGNOSIS — E55.9 VITAMIN D DEFICIENCY: Primary | ICD-10-CM

## 2024-09-13 LAB
25(OH)D3 SERPL-MCNC: 45 NG/ML (ref 30–100)
ALBUMIN SERPL BCG-MCNC: 4.3 G/DL (ref 3.5–5.1)
ALP SERPL-CCNC: 72 U/L (ref 38–126)
ALT SERPL W/O P-5'-P-CCNC: 17 U/L (ref 11–66)
ANION GAP SERPL CALC-SCNC: 11 MEQ/L (ref 8–16)
AST SERPL-CCNC: 19 U/L (ref 5–40)
BASOPHILS ABSOLUTE: 0.1 THOU/MM3 (ref 0–0.1)
BASOPHILS NFR BLD AUTO: 1.1 %
BILIRUB SERPL-MCNC: 0.4 MG/DL (ref 0.3–1.2)
BUN SERPL-MCNC: 14 MG/DL (ref 7–22)
CALCIUM SERPL-MCNC: 9.2 MG/DL (ref 8.5–10.5)
CHLORIDE SERPL-SCNC: 104 MEQ/L (ref 98–111)
CHOLESTEROL, FASTING: 202 MG/DL (ref 100–199)
CO2 SERPL-SCNC: 24 MEQ/L (ref 23–33)
CREAT SERPL-MCNC: 0.9 MG/DL (ref 0.4–1.2)
CREAT UR-MCNC: 98.6 MG/DL
DEPRECATED MEAN GLUCOSE BLD GHB EST-ACNC: 120 MG/DL (ref 70–126)
DEPRECATED RDW RBC AUTO: 38.9 FL (ref 35–45)
EOSINOPHIL NFR BLD AUTO: 2.5 %
EOSINOPHILS ABSOLUTE: 0.2 THOU/MM3 (ref 0–0.4)
ERYTHROCYTE [DISTWIDTH] IN BLOOD BY AUTOMATED COUNT: 11.5 % (ref 11.5–14.5)
GFR SERPL CREATININE-BSD FRML MDRD: 73 ML/MIN/1.73M2
GLUCOSE SERPL-MCNC: 121 MG/DL (ref 70–108)
HBA1C MFR BLD HPLC: 6 % (ref 4.4–6.4)
HCT VFR BLD AUTO: 43.2 % (ref 37–47)
HDLC SERPL-MCNC: 46 MG/DL
HGB BLD-MCNC: 14.9 GM/DL (ref 12–16)
IMM GRANULOCYTES # BLD AUTO: 0.02 THOU/MM3 (ref 0–0.07)
IMM GRANULOCYTES NFR BLD AUTO: 0.3 %
LDLC SERPL CALC-MCNC: 142 MG/DL
LYMPHOCYTES ABSOLUTE: 2.1 THOU/MM3 (ref 1–4.8)
LYMPHOCYTES NFR BLD AUTO: 31.9 %
MCH RBC QN AUTO: 32.3 PG (ref 26–33)
MCHC RBC AUTO-ENTMCNC: 34.5 GM/DL (ref 32.2–35.5)
MCV RBC AUTO: 93.7 FL (ref 81–99)
MICROALBUMIN UR-MCNC: < 1.2 MG/DL
MICROALBUMIN/CREAT RATIO PNL UR: 12 MG/G (ref 0–30)
MONOCYTES ABSOLUTE: 0.5 THOU/MM3 (ref 0.4–1.3)
MONOCYTES NFR BLD AUTO: 7.2 %
NEUTROPHILS ABSOLUTE: 3.7 THOU/MM3 (ref 1.8–7.7)
NEUTROPHILS NFR BLD AUTO: 57 %
NRBC BLD AUTO-RTO: 0 /100 WBC
PLATELET # BLD AUTO: 205 THOU/MM3 (ref 130–400)
PMV BLD AUTO: 11.4 FL (ref 9.4–12.4)
POTASSIUM SERPL-SCNC: 4.3 MEQ/L (ref 3.5–5.2)
PROT SERPL-MCNC: 7.1 G/DL (ref 6.1–8)
RBC # BLD AUTO: 4.61 MILL/MM3 (ref 4.2–5.4)
SODIUM SERPL-SCNC: 139 MEQ/L (ref 135–145)
TRIGLYCERIDE, FASTING: 70 MG/DL (ref 0–199)
WBC # BLD AUTO: 6.5 THOU/MM3 (ref 4.8–10.8)

## 2024-09-13 PROCEDURE — 80053 COMPREHEN METABOLIC PANEL: CPT

## 2024-09-13 PROCEDURE — 83036 HEMOGLOBIN GLYCOSYLATED A1C: CPT

## 2024-09-13 PROCEDURE — 82306 VITAMIN D 25 HYDROXY: CPT

## 2024-09-13 PROCEDURE — 99214 OFFICE O/P EST MOD 30 MIN: CPT | Performed by: NURSE PRACTITIONER

## 2024-09-13 PROCEDURE — 80061 LIPID PANEL: CPT

## 2024-09-13 PROCEDURE — G8417 CALC BMI ABV UP PARAM F/U: HCPCS | Performed by: NURSE PRACTITIONER

## 2024-09-13 PROCEDURE — G8427 DOCREV CUR MEDS BY ELIG CLIN: HCPCS | Performed by: NURSE PRACTITIONER

## 2024-09-13 PROCEDURE — 82043 UR ALBUMIN QUANTITATIVE: CPT

## 2024-09-13 PROCEDURE — 3017F COLORECTAL CA SCREEN DOC REV: CPT | Performed by: NURSE PRACTITIONER

## 2024-09-13 PROCEDURE — 1036F TOBACCO NON-USER: CPT | Performed by: NURSE PRACTITIONER

## 2024-09-13 PROCEDURE — 85025 COMPLETE CBC W/AUTO DIFF WBC: CPT

## 2024-09-13 PROCEDURE — 36415 COLL VENOUS BLD VENIPUNCTURE: CPT

## 2024-09-13 RX ORDER — CYCLOBENZAPRINE HCL 5 MG
TABLET ORAL
Qty: 60 TABLET | Refills: 1 | Status: SHIPPED | OUTPATIENT
Start: 2024-09-13

## 2024-09-13 RX ORDER — MIRABEGRON 50 MG/1
50 TABLET, EXTENDED RELEASE ORAL DAILY
Qty: 90 TABLET | Refills: 3 | Status: SHIPPED | OUTPATIENT
Start: 2024-09-13

## 2024-09-13 RX ORDER — DULOXETIN HYDROCHLORIDE 30 MG/1
30 CAPSULE, DELAYED RELEASE ORAL DAILY
Qty: 30 CAPSULE | Refills: 3 | Status: SHIPPED | OUTPATIENT
Start: 2024-09-13

## 2024-09-13 SDOH — ECONOMIC STABILITY: INCOME INSECURITY: HOW HARD IS IT FOR YOU TO PAY FOR THE VERY BASICS LIKE FOOD, HOUSING, MEDICAL CARE, AND HEATING?: NOT HARD AT ALL

## 2024-09-13 SDOH — ECONOMIC STABILITY: FOOD INSECURITY: WITHIN THE PAST 12 MONTHS, YOU WORRIED THAT YOUR FOOD WOULD RUN OUT BEFORE YOU GOT MONEY TO BUY MORE.: NEVER TRUE

## 2024-09-13 SDOH — ECONOMIC STABILITY: FOOD INSECURITY: WITHIN THE PAST 12 MONTHS, THE FOOD YOU BOUGHT JUST DIDN'T LAST AND YOU DIDN'T HAVE MONEY TO GET MORE.: NEVER TRUE

## 2024-09-13 ASSESSMENT — PATIENT HEALTH QUESTIONNAIRE - PHQ9
SUM OF ALL RESPONSES TO PHQ QUESTIONS 1-9: 3
SUM OF ALL RESPONSES TO PHQ9 QUESTIONS 1 & 2: 0
SUM OF ALL RESPONSES TO PHQ QUESTIONS 1-9: 3
10. IF YOU CHECKED OFF ANY PROBLEMS, HOW DIFFICULT HAVE THESE PROBLEMS MADE IT FOR YOU TO DO YOUR WORK, TAKE CARE OF THINGS AT HOME, OR GET ALONG WITH OTHER PEOPLE: NOT DIFFICULT AT ALL
4. FEELING TIRED OR HAVING LITTLE ENERGY: NOT AT ALL
6. FEELING BAD ABOUT YOURSELF - OR THAT YOU ARE A FAILURE OR HAVE LET YOURSELF OR YOUR FAMILY DOWN: NOT AT ALL
5. POOR APPETITE OR OVEREATING: NOT AT ALL
9. THOUGHTS THAT YOU WOULD BE BETTER OFF DEAD, OR OF HURTING YOURSELF: NOT AT ALL
SUM OF ALL RESPONSES TO PHQ QUESTIONS 1-9: 3
7. TROUBLE CONCENTRATING ON THINGS, SUCH AS READING THE NEWSPAPER OR WATCHING TELEVISION: NOT AT ALL
1. LITTLE INTEREST OR PLEASURE IN DOING THINGS: NOT AT ALL
SUM OF ALL RESPONSES TO PHQ QUESTIONS 1-9: 3
8. MOVING OR SPEAKING SO SLOWLY THAT OTHER PEOPLE COULD HAVE NOTICED. OR THE OPPOSITE, BEING SO FIGETY OR RESTLESS THAT YOU HAVE BEEN MOVING AROUND A LOT MORE THAN USUAL: NOT AT ALL
2. FEELING DOWN, DEPRESSED OR HOPELESS: NOT AT ALL
3. TROUBLE FALLING OR STAYING ASLEEP: NEARLY EVERY DAY

## 2024-09-16 ENCOUNTER — TELEPHONE (OUTPATIENT)
Dept: FAMILY MEDICINE CLINIC | Age: 59
End: 2024-09-16

## 2024-09-16 DIAGNOSIS — E78.5 DYSLIPIDEMIA: Primary | ICD-10-CM

## 2024-09-16 RX ORDER — PRAVASTATIN SODIUM 40 MG
40 TABLET ORAL NIGHTLY
Qty: 90 TABLET | Refills: 1 | Status: SHIPPED | OUTPATIENT
Start: 2024-09-16

## 2024-09-27 NOTE — TELEPHONE ENCOUNTER
Patient here today for vasectomy.    Consent signed and sent to scanning.       Denies known latex allergy or symptoms of latex allergy.  Patient completed pre-check-in on [employer].  Medications verified and updated.  Drug allergies reviewed.    All questions answered at this time.  Patient's groin prepped with Hibiclens in usual fashion.  Dr. Gutierrez arrived and procedure began.  Per Dr. Gutierrez, he injected patient with 15 ml of Lidocaine HCl 2%, NDC 23261/255/20, Lot# [id number], Exp: JULY 2026.    All written discharge instructions reviewed, verbalized understanding.  Patient given written instructions, sterile specimen cup, ice pack, and gauze to take home.   Pt notified and agreeable .

## 2024-10-25 ENCOUNTER — OFFICE VISIT (OUTPATIENT)
Dept: FAMILY MEDICINE CLINIC | Age: 59
End: 2024-10-25
Payer: COMMERCIAL

## 2024-10-25 VITALS
SYSTOLIC BLOOD PRESSURE: 160 MMHG | OXYGEN SATURATION: 99 % | RESPIRATION RATE: 14 BRPM | BODY MASS INDEX: 31.36 KG/M2 | WEIGHT: 177 LBS | TEMPERATURE: 97.9 F | HEART RATE: 64 BPM | DIASTOLIC BLOOD PRESSURE: 90 MMHG | HEIGHT: 63 IN

## 2024-10-25 DIAGNOSIS — M79.18 CHRONIC MUSCULOSKELETAL PAIN: ICD-10-CM

## 2024-10-25 DIAGNOSIS — G89.29 CHRONIC MUSCULOSKELETAL PAIN: ICD-10-CM

## 2024-10-25 DIAGNOSIS — N32.81 OAB (OVERACTIVE BLADDER): Primary | ICD-10-CM

## 2024-10-25 DIAGNOSIS — M15.0 PRIMARY OSTEOARTHRITIS INVOLVING MULTIPLE JOINTS: ICD-10-CM

## 2024-10-25 PROCEDURE — 99214 OFFICE O/P EST MOD 30 MIN: CPT | Performed by: NURSE PRACTITIONER

## 2024-10-25 PROCEDURE — G8484 FLU IMMUNIZE NO ADMIN: HCPCS | Performed by: NURSE PRACTITIONER

## 2024-10-25 PROCEDURE — G8427 DOCREV CUR MEDS BY ELIG CLIN: HCPCS | Performed by: NURSE PRACTITIONER

## 2024-10-25 PROCEDURE — 3017F COLORECTAL CA SCREEN DOC REV: CPT | Performed by: NURSE PRACTITIONER

## 2024-10-25 PROCEDURE — 1036F TOBACCO NON-USER: CPT | Performed by: NURSE PRACTITIONER

## 2024-10-25 PROCEDURE — G8417 CALC BMI ABV UP PARAM F/U: HCPCS | Performed by: NURSE PRACTITIONER

## 2024-10-25 RX ORDER — DULOXETINE 40 MG/1
40 CAPSULE, DELAYED RELEASE ORAL DAILY
Qty: 90 CAPSULE | Refills: 1 | Status: SHIPPED | OUTPATIENT
Start: 2024-10-25

## 2024-10-25 RX ORDER — CYCLOBENZAPRINE HCL 10 MG
10 TABLET ORAL NIGHTLY PRN
Qty: 90 TABLET | Refills: 1 | Status: SHIPPED | OUTPATIENT
Start: 2024-10-25

## 2024-10-25 NOTE — PROGRESS NOTES
Chief Complaint   Patient presents with    Pain     Getting better with medication        History obtained from chart review and the patient.    SUBJECTIVE:  Christen Parker is a 59 y.o. female that presents today for annual exam    Chronic Pain  Taking flexeril 10 mg at night and this is really helping with the muscle spasms  She does feel like the Cymbalta is helping with her pain  She is also taking the Mobic    OAB    She is taking Myrbetriq 50 mg  She does feel like it's helping  Urinating 1-2 times/night  Urinary frequency during the day is decent  She does drink quite a bit of coffee so this doesn't also contribute    Age/Gender Health Maintenance    Lipid -   Lab Results   Component Value Date    CHOL 213 (H) 09/10/2016    CHOL 180 03/14/2015     Lab Results   Component Value Date    TRIG 173 09/10/2016    TRIG 74 03/14/2015     Lab Results   Component Value Date    HDL 46 09/13/2024    HDL 45 08/11/2023    HDL 43 09/28/2021     No components found for: \"LDLCHOLESTEROL\", \"LDLCALC\"    DM Screen -   Lab Results   Component Value Date    LABA1C 6.0 09/13/2024     Colon Cancer Screening - 2015  Lung Cancer Screening (Age 55 to 80 with 30 pack year hx, current smoker or quit within past 15 years) - n/a    Tetanus - needs  Influenza Vaccine - next due 8/16  Pneumonia Vaccine - 65  Zostavax - 50  HPV Vaccine - n/a    Breast Cancer Screening - 7/15  Cervical Cancer Screening - n/a, had hysterectomy  Osteoporosis Screening - 65  Chlamydia Screen - n/a    AAA Screening - n/a    Falls screening - n/a    Current Outpatient Medications   Medication Sig Dispense Refill    cyclobenzaprine (FLEXERIL) 10 MG tablet Take 1 tablet by mouth nightly as needed for Muscle spasms 90 tablet 1    DULoxetine 40 MG CPEP Take 40 mg by mouth daily 90 capsule 1    pravastatin (PRAVACHOL) 40 MG tablet Take 1 tablet by mouth nightly 90 tablet 1    mirabegron (MYRBETRIQ) 50 MG TB24 Take 50 mg by mouth daily 90 tablet 3    vitamin D-3

## 2024-10-28 ENCOUNTER — TELEPHONE (OUTPATIENT)
Dept: FAMILY MEDICINE CLINIC | Age: 59
End: 2024-10-28

## 2024-10-28 DIAGNOSIS — G89.29 CHRONIC MUSCULOSKELETAL PAIN: ICD-10-CM

## 2024-10-28 DIAGNOSIS — M79.18 CHRONIC MUSCULOSKELETAL PAIN: ICD-10-CM

## 2024-10-28 DIAGNOSIS — M15.0 PRIMARY OSTEOARTHRITIS INVOLVING MULTIPLE JOINTS: ICD-10-CM

## 2024-10-28 RX ORDER — DULOXETIN HYDROCHLORIDE 20 MG/1
40 CAPSULE, DELAYED RELEASE ORAL DAILY
Qty: 180 CAPSULE | Refills: 1 | Status: SHIPPED | OUTPATIENT
Start: 2024-10-28

## 2024-10-28 NOTE — TELEPHONE ENCOUNTER
Patients Duloxetine was denied through insurance.    Reasoning:     Coverage is provided when the member meets ALL the following requirements: Documentation must be provided of medical necessity beyond convenience for why the member cannot be changed to the preferred drug(s) Duloxetine 20 mg (no quantity limit) [facility] Policy for Medical Necessity as posted on the [facility] website and [facility] Preferred Drug List criteria were reviewed and per [place] Administrative Code Rule 5160-1-01 (C) and 5160-26-03 (B), a medically necessary service must include: generally accepted standards of medical practice, be clinically appropriate in administration, treatment and outcome and be the lowest cost alternative to effectively treat the condition. Please contact your provider to assist you with other treatment options that might be covered under your benefit package, or other services that might be available through the community     Looking like insurance will cover the 20mg, could you send in a new script for 2 capsules to see if insurance will approve this?    Thank you

## 2024-11-02 LAB
CHOLESTEROL, TOTAL: 152 MG/DL (ref 100–199)
CHOLESTEROL/HDL RATIO: 3.2 (ref 2–4.5)
HDLC SERPL-MCNC: 48 MG/DL
LDL CHOLESTEROL: 85 MG/DL
TRIGL SERPL-MCNC: 97 MG/DL (ref 20–149)
VLDLC SERPL CALC-MCNC: 19 MG/DL

## 2024-11-04 ENCOUNTER — TELEPHONE (OUTPATIENT)
Dept: FAMILY MEDICINE CLINIC | Age: 59
End: 2024-11-04

## 2024-11-04 NOTE — TELEPHONE ENCOUNTER
Called patient and informed of message.  Patient verbalized understanding.   No questions or concerns at this time.

## 2024-11-04 NOTE — TELEPHONE ENCOUNTER
----- Message from OPAL Kurtz CNP sent at 11/2/2024  1:33 PM EDT -----  Cholesterol much improved. Would recommend continuing cholesterol medication

## 2024-12-17 DIAGNOSIS — M17.11 PRIMARY OSTEOARTHRITIS OF RIGHT KNEE: ICD-10-CM

## 2024-12-17 DIAGNOSIS — M15.0 PRIMARY OSTEOARTHRITIS INVOLVING MULTIPLE JOINTS: ICD-10-CM

## 2024-12-17 RX ORDER — MELOXICAM 15 MG/1
15 TABLET ORAL DAILY
Qty: 90 TABLET | Refills: 2 | Status: SHIPPED | OUTPATIENT
Start: 2024-12-17

## 2024-12-17 NOTE — TELEPHONE ENCOUNTER
Recent Visits  Date Type Provider Dept   10/25/24 Office Visit Kavin Brothers APRN - CNP [facility]   09/13/24 Office Visit [facility] APRN - CNP [facility]   08/11/23 Office Visit [facility] APRN - CNP [facility]   Showing recent visits within past 540 days with a meds authorizing provider and meeting all other requirements  Future Appointments  No visits were found meeting these conditions.  Showing future appointments within next 150 days with a meds authorizing provider and meeting all other requirements

## 2024-12-23 ENCOUNTER — TELEPHONE (OUTPATIENT)
Dept: FAMILY MEDICINE CLINIC | Age: 59
End: 2024-12-23

## 2024-12-23 ENCOUNTER — OFFICE VISIT (OUTPATIENT)
Dept: FAMILY MEDICINE CLINIC | Age: 59
End: 2024-12-23
Payer: COMMERCIAL

## 2024-12-23 ENCOUNTER — HOSPITAL ENCOUNTER (OUTPATIENT)
Age: 59
Discharge: HOME | End: 2024-12-23
Payer: COMMERCIAL

## 2024-12-23 ENCOUNTER — HOSPITAL ENCOUNTER (OUTPATIENT)
Dept: GENERAL RADIOLOGY | Age: 59
Discharge: HOME | End: 2024-12-23
Payer: COMMERCIAL

## 2024-12-23 VITALS
HEIGHT: 63 IN | RESPIRATION RATE: 12 BRPM | DIASTOLIC BLOOD PRESSURE: 62 MMHG | TEMPERATURE: 96.8 F | SYSTOLIC BLOOD PRESSURE: 118 MMHG | HEART RATE: 67 BPM | OXYGEN SATURATION: 98 % | BODY MASS INDEX: 30.76 KG/M2 | WEIGHT: 173.6 LBS

## 2024-12-23 DIAGNOSIS — M67.911 TENDINOPATHY OF RIGHT ROTATOR CUFF: ICD-10-CM

## 2024-12-23 DIAGNOSIS — M25.511 ACUTE PAIN OF RIGHT SHOULDER: ICD-10-CM

## 2024-12-23 DIAGNOSIS — M25.511 ACUTE PAIN OF RIGHT SHOULDER: Primary | ICD-10-CM

## 2024-12-23 PROCEDURE — G8417 CALC BMI ABV UP PARAM F/U: HCPCS | Performed by: NURSE PRACTITIONER

## 2024-12-23 PROCEDURE — 1036F TOBACCO NON-USER: CPT | Performed by: NURSE PRACTITIONER

## 2024-12-23 PROCEDURE — 3017F COLORECTAL CA SCREEN DOC REV: CPT | Performed by: NURSE PRACTITIONER

## 2024-12-23 PROCEDURE — 73030 X-RAY EXAM OF SHOULDER: CPT

## 2024-12-23 PROCEDURE — 99214 OFFICE O/P EST MOD 30 MIN: CPT | Performed by: NURSE PRACTITIONER

## 2024-12-23 PROCEDURE — G8427 DOCREV CUR MEDS BY ELIG CLIN: HCPCS | Performed by: NURSE PRACTITIONER

## 2024-12-23 PROCEDURE — G8484 FLU IMMUNIZE NO ADMIN: HCPCS | Performed by: NURSE PRACTITIONER

## 2024-12-23 RX ORDER — PREDNISONE 20 MG/1
40 TABLET ORAL DAILY
Qty: 10 TABLET | Refills: 0 | Status: SHIPPED | OUTPATIENT
Start: 2024-12-23 | End: 2024-12-28

## 2024-12-23 RX ORDER — TRAMADOL HYDROCHLORIDE 50 MG/1
50 TABLET ORAL EVERY 8 HOURS PRN
Qty: 21 TABLET | Refills: 0 | Status: SHIPPED | OUTPATIENT
Start: 2024-12-23 | End: 2024-12-30

## 2024-12-23 RX ORDER — TRAMADOL HYDROCHLORIDE 50 MG/1
50 TABLET ORAL EVERY 6 HOURS PRN
Qty: 28 TABLET | Refills: 0 | Status: SHIPPED | OUTPATIENT
Start: 2024-12-23 | End: 2024-12-23

## 2024-12-23 NOTE — TELEPHONE ENCOUNTER
----- Message from OPAL Cary CNP sent at 12/23/2024  3:45 PM EST -----  Let Christen know her shoulder xray does show some degenerative changes. However, I'm not convinced this is the cause of her pain. Con't with plan as discussed in office.

## 2024-12-23 NOTE — PROGRESS NOTES
on file   Occupational History    Not on file   Tobacco Use    Smoking status: Former     Current packs/day: 0.00     Average packs/day: 0.5 packs/day for 20.0 years (10.0 ttl pk-yrs)     Types: Cigarettes     Start date: 1973     Quit date: 1993     Years since quittin.3    Smokeless tobacco: Never   Vaping Use    Vaping status: Never Used   Substance and Sexual Activity    Alcohol use: Not Currently    Drug use: No    Sexual activity: Not on file   Other Topics Concern    Not on file   Social History Narrative    Not on file     Social Determinants of Health     Financial Resource Strain: Low Risk  (2024)    Overall Financial Resource Strain (CARDIA)     Difficulty of Paying Living Expenses: Not hard at all   Food Insecurity: No Food Insecurity (2024)    Hunger Vital Sign     Worried About Running Out of Food in the Last Year: Never true     Ran Out of Food in the Last Year: Never true   Transportation Needs: Unknown (2024)    PRAPARE - Transportation     Lack of Transportation (Medical): Not on file     Lack of Transportation (Non-Medical): No   Physical Activity: Not on file   Stress: Not on file   Social Connections: Not on file   Intimate Partner Violence: Not on file   Housing Stability: Unknown (2024)    Housing Stability Vital Sign     Unable to Pay for Housing in the Last Year: Not on file     Number of Times Moved in the Last Year: Not on file     Homeless in the Last Year: No       Family History   Problem Relation Age of Onset    Diabetes Mother     High Blood Pressure Mother     Breast Cancer Mother 55    Kidney Disease Mother     Mental Illness Father     Breast Cancer Maternal Grandmother         unknown         I have reviewed the patient's past medical history, past surgical history, allergies, medications, social and family history and I have made updates where appropriate.      Review of Systems  Positive responses are highlighted in bold    Constitutional:

## 2024-12-23 NOTE — TELEPHONE ENCOUNTER
Future Appointments   Date Time Provider Department Center   12/23/2024  2:20 PM K[facility] Brothers, APRN - CNP [facility]   1/7/2025  4:40 PM STR MAMMOGRAPHY RM3 [facility]OM[facility] STR Rad/Card   10/27/2025 10:00 AM K[facility] Brothers APRN - CNP [facility]

## 2024-12-23 NOTE — TELEPHONE ENCOUNTER
[facility] called and is asking the script for Tramadol to be re-wrote with a max of 3 pills a day. Insurance is rejecting due to there being 4 pills max a day.

## 2024-12-23 NOTE — TELEPHONE ENCOUNTER
Pt informed of shoulder xray and that it does show some degenerative changes.  . Pt voiced understanding with no further questions at this time.

## 2025-01-07 ENCOUNTER — HOSPITAL ENCOUNTER (OUTPATIENT)
Dept: WOMENS IMAGING | Age: 60
Discharge: HOME | End: 2025-01-07
Payer: COMMERCIAL

## 2025-01-07 VITALS — BODY MASS INDEX: 30.65 KG/M2 | WEIGHT: 173 LBS

## 2025-01-07 DIAGNOSIS — Z12.31 VISIT FOR SCREENING MAMMOGRAM: ICD-10-CM

## 2025-01-07 PROCEDURE — 77063 BREAST TOMOSYNTHESIS BI: CPT

## 2025-03-07 DIAGNOSIS — E78.5 DYSLIPIDEMIA: ICD-10-CM

## 2025-03-07 RX ORDER — PRAVASTATIN SODIUM 40 MG
40 TABLET ORAL NIGHTLY
Qty: 90 TABLET | Refills: 2 | Status: SHIPPED | OUTPATIENT
Start: 2025-03-07

## 2025-03-07 NOTE — TELEPHONE ENCOUNTER
Recent Visits  Date Type Provider Dept   12/23/24 Office Visit K[facility] Brothers APRN - CNP [facility]   10/25/24 Office Visit [facility] APRN - CNP [facility]   09/13/24 Office Visit [facility] APRN - CNP [facility]   Showing recent visits within past 540 days with a meds authorizing provider and meeting all other requirements  Future Appointments  No visits were found meeting these conditions.  Showing future appointments within next 150 days with a meds authorizing provider and meeting all other requirements

## 2025-04-07 ENCOUNTER — HOSPITAL ENCOUNTER (OUTPATIENT)
Dept: CT IMAGING | Age: 60
Discharge: HOME | End: 2025-04-07
Payer: COMMERCIAL

## 2025-04-07 ENCOUNTER — OFFICE VISIT (OUTPATIENT)
Dept: FAMILY MEDICINE CLINIC | Age: 60
End: 2025-04-07
Payer: COMMERCIAL

## 2025-04-07 ENCOUNTER — TELEPHONE (OUTPATIENT)
Dept: FAMILY MEDICINE CLINIC | Age: 60
End: 2025-04-07

## 2025-04-07 VITALS
OXYGEN SATURATION: 98 % | RESPIRATION RATE: 12 BRPM | HEART RATE: 74 BPM | WEIGHT: 186.4 LBS | TEMPERATURE: 96.8 F | SYSTOLIC BLOOD PRESSURE: 138 MMHG | DIASTOLIC BLOOD PRESSURE: 102 MMHG | BODY MASS INDEX: 33.03 KG/M2 | HEIGHT: 63 IN

## 2025-04-07 DIAGNOSIS — R10.9 RIGHT FLANK PAIN: Primary | ICD-10-CM

## 2025-04-07 DIAGNOSIS — N39.0 ACUTE URINARY TRACT INFECTION: ICD-10-CM

## 2025-04-07 DIAGNOSIS — R10.9 RIGHT FLANK PAIN: ICD-10-CM

## 2025-04-07 LAB
ANION GAP SERPL CALC-SCNC: 10 MEQ/L (ref 8–16)
BILIRUBIN, URINE: NEGATIVE
BLOOD URINE, POC: ABNORMAL
BUN SERPL-MCNC: 14 MG/DL (ref 8–23)
CALCIUM SERPL-MCNC: 9.3 MG/DL (ref 8.8–10.2)
CHARACTER, URINE: ABNORMAL
CHLORIDE SERPL-SCNC: 101 MEQ/L (ref 98–111)
CO2 SERPL-SCNC: 28 MEQ/L (ref 22–29)
COLOR, UA: YELLOW
CREAT SERPL-MCNC: 1.1 MG/DL (ref 0.5–0.9)
GFR SERPL CREATININE-BSD FRML MDRD: 57 ML/MIN/1.73M2
GLUCOSE SERPL-MCNC: 110 MG/DL (ref 74–109)
GLUCOSE URINE: NEGATIVE MG/DL
KETONES, URINE: NEGATIVE
LEUKOCYTE CLUMPS, URINE: ABNORMAL
NITRITE, URINE: POSITIVE
PH, URINE: 6 (ref 5–9)
POTASSIUM SERPL-SCNC: 3.9 MEQ/L (ref 3.5–5.2)
PROTEIN, URINE: 30 MG/DL
SODIUM SERPL-SCNC: 139 MEQ/L (ref 135–145)
SPECIFIC GRAVITY UA: >= 1.03 (ref 1–1.03)
UROBILINOGEN, URINE: 0.2 EU/DL (ref 0–1)

## 2025-04-07 PROCEDURE — G8417 CALC BMI ABV UP PARAM F/U: HCPCS | Performed by: NURSE PRACTITIONER

## 2025-04-07 PROCEDURE — 80048 BASIC METABOLIC PNL TOTAL CA: CPT

## 2025-04-07 PROCEDURE — G8427 DOCREV CUR MEDS BY ELIG CLIN: HCPCS | Performed by: NURSE PRACTITIONER

## 2025-04-07 PROCEDURE — 3017F COLORECTAL CA SCREEN DOC REV: CPT | Performed by: NURSE PRACTITIONER

## 2025-04-07 PROCEDURE — 74176 CT ABD & PELVIS W/O CONTRAST: CPT

## 2025-04-07 PROCEDURE — 36415 COLL VENOUS BLD VENIPUNCTURE: CPT

## 2025-04-07 PROCEDURE — 99214 OFFICE O/P EST MOD 30 MIN: CPT | Performed by: NURSE PRACTITIONER

## 2025-04-07 PROCEDURE — 1036F TOBACCO NON-USER: CPT | Performed by: NURSE PRACTITIONER

## 2025-04-07 RX ORDER — CIPROFLOXACIN 500 MG/1
500 TABLET, FILM COATED ORAL 2 TIMES DAILY
Qty: 14 TABLET | Refills: 0 | Status: SHIPPED | OUTPATIENT
Start: 2025-04-07 | End: 2025-04-14

## 2025-04-07 RX ORDER — HYDROCODONE BITARTRATE AND ACETAMINOPHEN 5; 325 MG/1; MG/1
1 TABLET ORAL EVERY 6 HOURS PRN
Qty: 12 TABLET | Refills: 0 | Status: SHIPPED | OUTPATIENT
Start: 2025-04-07 | End: 2025-04-10

## 2025-04-07 SDOH — ECONOMIC STABILITY: FOOD INSECURITY: WITHIN THE PAST 12 MONTHS, THE FOOD YOU BOUGHT JUST DIDN'T LAST AND YOU DIDN'T HAVE MONEY TO GET MORE.: NEVER TRUE

## 2025-04-07 SDOH — ECONOMIC STABILITY: FOOD INSECURITY: WITHIN THE PAST 12 MONTHS, YOU WORRIED THAT YOUR FOOD WOULD RUN OUT BEFORE YOU GOT MONEY TO BUY MORE.: NEVER TRUE

## 2025-04-07 ASSESSMENT — PATIENT HEALTH QUESTIONNAIRE - PHQ9
9. THOUGHTS THAT YOU WOULD BE BETTER OFF DEAD, OR OF HURTING YOURSELF: NOT AT ALL
1. LITTLE INTEREST OR PLEASURE IN DOING THINGS: NOT AT ALL
SUM OF ALL RESPONSES TO PHQ QUESTIONS 1-9: 0
SUM OF ALL RESPONSES TO PHQ QUESTIONS 1-9: 0
2. FEELING DOWN, DEPRESSED OR HOPELESS: NOT AT ALL
10. IF YOU CHECKED OFF ANY PROBLEMS, HOW DIFFICULT HAVE THESE PROBLEMS MADE IT FOR YOU TO DO YOUR WORK, TAKE CARE OF THINGS AT HOME, OR GET ALONG WITH OTHER PEOPLE: NOT DIFFICULT AT ALL
6. FEELING BAD ABOUT YOURSELF - OR THAT YOU ARE A FAILURE OR HAVE LET YOURSELF OR YOUR FAMILY DOWN: NOT AT ALL
8. MOVING OR SPEAKING SO SLOWLY THAT OTHER PEOPLE COULD HAVE NOTICED. OR THE OPPOSITE, BEING SO FIGETY OR RESTLESS THAT YOU HAVE BEEN MOVING AROUND A LOT MORE THAN USUAL: NOT AT ALL
7. TROUBLE CONCENTRATING ON THINGS, SUCH AS READING THE NEWSPAPER OR WATCHING TELEVISION: NOT AT ALL
5. POOR APPETITE OR OVEREATING: NOT AT ALL
SUM OF ALL RESPONSES TO PHQ QUESTIONS 1-9: 0
4. FEELING TIRED OR HAVING LITTLE ENERGY: NOT AT ALL
3. TROUBLE FALLING OR STAYING ASLEEP: NOT AT ALL
SUM OF ALL RESPONSES TO PHQ QUESTIONS 1-9: 0

## 2025-04-07 NOTE — PROGRESS NOTES
Chief Complaint   Patient presents with    Flank Pain     Started about a week. Urine yellowish/orange color, foul odor. Pain worse in right than left side of back. Has been doing Ibuprofen and heat pad. Denies blood or fever       History obtained from chart review and the patient.    SUBJECTIVE:  Christen Parker is a 59 y.o. female that presents today for flank pain    Urinary Symptoms    HPI:      Symptoms have been present for 1 week(s).    Difficulty initiating a stream? No  Increased urinary frequency?  No  Weak urinary stream?  No  Dysuria? No  Nocturia?  No  Hematuria?  No  Perineal discomfort? No      Age/Gender Health Maintenance    Lipid -   Lab Results   Component Value Date    CHOL 152 11/01/2024    CHOL 213 (H) 09/10/2016    CHOL 180 03/14/2015     Lab Results   Component Value Date    TRIG 97 11/01/2024    TRIG 173 09/10/2016    TRIG 74 03/14/2015     Lab Results   Component Value Date    HDL 48 (L) 11/01/2024    HDL 46 09/13/2024    HDL 45 08/11/2023     No components found for: \"LDLCHOLESTEROL\", \"LDLCALC\"    DM Screen -   Lab Results   Component Value Date    LABA1C 6.0 09/13/2024     Colon Cancer Screening - 2015  Lung Cancer Screening (Age 55 to 80 with 30 pack year hx, current smoker or quit within past 15 years) - n/a    Tetanus - needs  Influenza Vaccine - next due 8/16  Pneumonia Vaccine - 65  Zostavax - 50  HPV Vaccine - n/a    Breast Cancer Screening - 7/15  Cervical Cancer Screening - n/a, had hysterectomy  Osteoporosis Screening - 65  Chlamydia Screen - n/a    AAA Screening - n/a    Falls screening - n/a    Current Outpatient Medications   Medication Sig Dispense Refill    ciprofloxacin (CIPRO) 500 MG tablet Take 1 tablet by mouth 2 times daily for 7 days 14 tablet 0    HYDROcodone-acetaminophen (NORCO) 5-325 MG per tablet Take 1 tablet by mouth every 6 hours as needed for Pain for up to 3 days. Intended supply: 3 days. Take lowest dose possible to manage pain Max Daily Amount: 4

## 2025-04-07 NOTE — TELEPHONE ENCOUNTER
Pt caled and has some pain mainly on the right side of her lower back, but does have some pain in her left side also. She stated she is not having any pain when she is urination just the flank pain. She is asking if she can get some testing to see if she has a kidney stone. She will also need another referral to [facility], its been over 3 years since she has seen them. She is asking if you are able to see something for the discomfort also.

## 2025-04-07 NOTE — TELEPHONE ENCOUNTER
Future Appointments   Date Time Provider Department Center   4/7/2025  3:20 PM Kavin Brothers APRN - CNP [facility]   10/27/2025 10:00 AM Kavin Brothers APRN - CNP [facility]

## 2025-04-07 NOTE — PROGRESS NOTES
Called and spoke with JAIME @ [facility]. Was able to schedule patient's STAT CT @ [place]. Patient made aware of this and was to head over now. Patient denies needing address for [facility] as she know where this is located.

## 2025-04-08 ENCOUNTER — RESULTS FOLLOW-UP (OUTPATIENT)
Dept: FAMILY MEDICINE CLINIC | Age: 60
End: 2025-04-08

## 2025-04-08 ENCOUNTER — TELEPHONE (OUTPATIENT)
Dept: FAMILY MEDICINE CLINIC | Age: 60
End: 2025-04-08

## 2025-04-08 NOTE — TELEPHONE ENCOUNTER
Pt informed of results and recommendations . Pt voiced understanding with no further questions at this time.

## 2025-04-08 NOTE — TELEPHONE ENCOUNTER
Kavin Brothers, APRN - CNP  P [facility] Clinical Staff  Let Christen know her kidney function look ok, is stable. Her CT looks good, no kidney stones or infection up in the kidney. It looks like her symptoms are mostly due to the UTI. I would finish up the Abx, please f/u with me if symptoms worsen or do not improve.

## 2025-04-08 NOTE — TELEPHONE ENCOUNTER
Left message on answering machine. Requested pt to call back at [phone], at their earliest convenience.

## 2025-04-10 ENCOUNTER — RESULTS FOLLOW-UP (OUTPATIENT)
Dept: FAMILY MEDICINE CLINIC | Age: 60
End: 2025-04-10

## 2025-04-10 LAB
BACTERIA UR CULT: ABNORMAL
ORGANISM: ABNORMAL

## 2025-04-10 NOTE — TELEPHONE ENCOUNTER
----- Message from OPAL Cary CNP sent at 4/10/2025  9:00 AM EDT -----  Let Christen know her urine culture did come back growing Ecoli, a common bacteria causing UTI's. Looks like the Cipro should take care of it though. Is she feeling better?

## 2025-04-10 NOTE — TELEPHONE ENCOUNTER
I called and spoke to Christen and she verbalized understanding and said she is getting better. No questions or concerns at this time.

## 2025-04-21 DIAGNOSIS — M79.18 CHRONIC MUSCULOSKELETAL PAIN: ICD-10-CM

## 2025-04-21 DIAGNOSIS — G89.29 CHRONIC MUSCULOSKELETAL PAIN: ICD-10-CM

## 2025-04-21 DIAGNOSIS — M15.0 PRIMARY OSTEOARTHRITIS INVOLVING MULTIPLE JOINTS: ICD-10-CM

## 2025-04-21 RX ORDER — DULOXETIN HYDROCHLORIDE 20 MG/1
40 CAPSULE, DELAYED RELEASE ORAL DAILY
Qty: 180 CAPSULE | Refills: 1 | Status: SHIPPED | OUTPATIENT
Start: 2025-04-21

## 2025-04-21 NOTE — TELEPHONE ENCOUNTER
Recent Visits  Date Type Provider Dept   04/07/25 Office Visit Kavin Brothers APRN - CNP [facility]   12/23/24 Office Visit Kavin Brothers APRN - CNP [facility]   10/25/24 Office Visit Kavin Brothers APRN - CNP [facility]   09/13/24 Office Visit Kavin Brothers APRN - CNP [facility]   Showing recent visits within past 540 days with a meds authorizing provider and meeting all other requirements  Future Appointments  No visits were found meeting these conditions.  Showing future appointments within next 150 days with a meds authorizing provider and meeting all other requirements

## 2025-05-12 DIAGNOSIS — G89.29 CHRONIC MUSCULOSKELETAL PAIN: ICD-10-CM

## 2025-05-12 DIAGNOSIS — M79.18 CHRONIC MUSCULOSKELETAL PAIN: ICD-10-CM

## 2025-05-13 RX ORDER — CYCLOBENZAPRINE HCL 10 MG
TABLET ORAL
Qty: 90 TABLET | Refills: 0 | Status: SHIPPED | OUTPATIENT
Start: 2025-05-13

## 2025-08-14 DIAGNOSIS — G89.29 CHRONIC MUSCULOSKELETAL PAIN: ICD-10-CM

## 2025-08-14 DIAGNOSIS — M79.18 CHRONIC MUSCULOSKELETAL PAIN: ICD-10-CM

## 2025-08-14 RX ORDER — CYCLOBENZAPRINE HCL 10 MG
TABLET ORAL
Qty: 90 TABLET | Refills: 0 | Status: SHIPPED | OUTPATIENT
Start: 2025-08-14

## (undated) DEVICE — GLOVE ORANGE PI 8   MSG9080

## (undated) DEVICE — BASIC SINGLE BASIN BTC-LF: Brand: MEDLINE INDUSTRIES, INC.

## (undated) DEVICE — Y-TYPE TUR/BLADDER IRRIGATION SET, REGULATING CLAMP

## (undated) DEVICE — GUIDEWIRE ENDOSCP L150CM DIA0.035IN TIP 3CM PTFE NIT

## (undated) DEVICE — CONTAINER,SPECIMEN,PNEU TUBE,4OZ,OR STRL: Brand: MEDLINE

## (undated) DEVICE — COLLAR CERV UNIV AD L13-19IN TRACH OPN TWO PC RIG POLYETH

## (undated) DEVICE — DRAPE MICSCP W132XL406CM LENS DIA68MM W VARI LENS2 FOR LEICA

## (undated) DEVICE — GLOVE ORANGE PI 7 1/2   MSG9075

## (undated) DEVICE — 2.7MM SHORT DRILL BIT W/QUICK CONNECT: Brand: PERI-LOC

## (undated) DEVICE — Device: Brand: FABCO

## (undated) DEVICE — COVER ARMBRD W13XL28.5IN IMPERV BLU FOR OP RM

## (undated) DEVICE — 1010 S-DRAPE TOWEL DRAPE 10/BX: Brand: STERI-DRAPE™

## (undated) DEVICE — APPLICATOR MEDICATED 10.5 CC SOLUTION HI LT ORNG CHLORAPREP

## (undated) DEVICE — GAUZE,SPONGE,8"X4",12PLY,XRAY,STRL,LF: Brand: MEDLINE

## (undated) DEVICE — SUTURE ETHLN SZ 3-0 L18IN NONABSORBABLE BLK FS-1 L24MM 3/8 663H

## (undated) DEVICE — DRILL, 16MM: Brand: ADMIRAL

## (undated) DEVICE — AGENT HEMOSTATIC SURGIFLOW MATRIX KIT W/THROMBIN

## (undated) DEVICE — BANDAGE COMPR E SGL LAYERED CLSR BGE W/ CLP W3INXL15FT

## (undated) DEVICE — GOWN,SIRUS,NON REINFRCD,LARGE,SET IN SL: Brand: MEDLINE

## (undated) DEVICE — PERI-LOC 2.0MM SHORT DRILL BIT W/                                    QUICK CONNECT: Brand: PERI-LOC

## (undated) DEVICE — CATHETER ANGIO 18FR L4CM RBP 20ATM HYDR+ ULT HI PRSS SHT

## (undated) DEVICE — CODMAN® SURGICAL PATTIES 1/2" X 1/2" (1.27CM X 1.27CM): Brand: CODMAN®

## (undated) DEVICE — SUTURE VCRL SZ 3-0 L18IN ABSRB VLT SUTUPAK PRECUT W/O NDL J104T

## (undated) DEVICE — BLADE ES L4IN INSUL EDGE

## (undated) DEVICE — DUP USE 304928 DRESSING GZ 12 PLY 4X4 STRL

## (undated) DEVICE — DRESSING,GAUZE,XEROFORM,CURAD,5"X9",ST: Brand: CURAD

## (undated) DEVICE — PACK-MAJOR

## (undated) DEVICE — GLOVE ORANGE PI 8 1/2   MSG9085

## (undated) DEVICE — DRAIN SURG FLAT W7MMXL20CM FULL PERF

## (undated) DEVICE — DRESSING TRNSPAR W5XL4.5IN FLM SHT SEMIPERMEABLE WIND

## (undated) DEVICE — SPONGE GZ W4XL4IN COT 12 PLY TYP VII WVN C FLD DSGN

## (undated) DEVICE — ROYAL SILK SURGICAL GOWN, XXL: Brand: CONVERTORS

## (undated) DEVICE — AEGIS 1" DISK 4MM HOLE, PEEL OPEN: Brand: MEDLINE

## (undated) DEVICE — Device

## (undated) DEVICE — 3M™ STERI-STRIP™ COMPOUND BENZOIN TINCTURE 40 BAGS/CARTON 4 CARTONS/CASE C1544: Brand: 3M™ STERI-STRIP™

## (undated) DEVICE — SUTURE MCRYL SZ 4-0 L27IN ABSRB UD L19MM PS-2 1/2 CIR PRIM Y426H

## (undated) DEVICE — STRIP,CLOSURE,WOUND,MEDI-STRIP,1/2X4: Brand: MEDLINE

## (undated) DEVICE — CHLORAPREP 26ML ORANGE

## (undated) DEVICE — COLLAR CERV COT DIAL HT ADJ XL PT ACCS WIND VISTA

## (undated) DEVICE — SUTURE VCRL SZ 3-0 L27IN ABSRB UD FS-2 L19MM 1/2 CIR J423H

## (undated) DEVICE — SKIN AFFIX SURG ADHESIVE 72/CS 0.55ML: Brand: MEDLINE

## (undated) DEVICE — PAD OP RM W20XL72XH2IN PRECIS CUT FLAT EC BIOCLINIC

## (undated) DEVICE — BANDAGE,GAUZE,CONFORMING,3"X75",STRL,LF: Brand: MEDLINE

## (undated) DEVICE — BUR CUT RND FLUT AGRSV 4.0MM

## (undated) DEVICE — DISCONTINUED USE 394504 SYRINGE LUER LOCK TIP 12 ML

## (undated) DEVICE — GLOVE SURG SZ 65 THK91MIL LTX FREE SYN POLYISOPRENE

## (undated) DEVICE — GLOVE SURG SZ 9 THK91MIL LTX FREE SYN POLYISOPRENE ANTI

## (undated) DEVICE — EVACUATOR SURG 100CC SIL BLB SUCT RESVR FOR CLS WND DRNGE